# Patient Record
Sex: MALE | Race: WHITE | Employment: OTHER | URBAN - METROPOLITAN AREA
[De-identification: names, ages, dates, MRNs, and addresses within clinical notes are randomized per-mention and may not be internally consistent; named-entity substitution may affect disease eponyms.]

---

## 2017-01-09 LAB
A/G RATIO: 1.8 (ref 1.1–2.2)
ALBUMIN SERPL-MCNC: 4.4 G/DL (ref 3.4–5)
ALP BLD-CCNC: 49 U/L (ref 40–129)
ALT SERPL-CCNC: 22 U/L (ref 10–40)
ANION GAP SERPL CALCULATED.3IONS-SCNC: 15 MMOL/L (ref 3–16)
AST SERPL-CCNC: 21 U/L (ref 15–37)
BASOPHILS ABSOLUTE: 0 K/UL (ref 0–0.2)
BASOPHILS RELATIVE PERCENT: 0.7 %
BILIRUB SERPL-MCNC: 0.7 MG/DL (ref 0–1)
BILIRUBIN URINE: NEGATIVE
BLOOD, URINE: NEGATIVE
BUN BLDV-MCNC: 18 MG/DL (ref 7–20)
CALCIUM SERPL-MCNC: 9.3 MG/DL (ref 8.3–10.6)
CHLORIDE BLD-SCNC: 106 MMOL/L (ref 99–110)
CHOLESTEROL, TOTAL: 194 MG/DL (ref 0–199)
CLARITY: CLEAR
CO2: 23 MMOL/L (ref 21–32)
COLOR: YELLOW
CREAT SERPL-MCNC: 0.9 MG/DL (ref 0.9–1.3)
EOSINOPHILS ABSOLUTE: 0.2 K/UL (ref 0–0.6)
EOSINOPHILS RELATIVE PERCENT: 2.4 %
GFR AFRICAN AMERICAN: >60
GFR NON-AFRICAN AMERICAN: >60
GLOBULIN: 2.4 G/DL
GLUCOSE BLD-MCNC: 118 MG/DL (ref 70–99)
GLUCOSE URINE: NEGATIVE MG/DL
HCT VFR BLD CALC: 50.5 % (ref 40.5–52.5)
HDLC SERPL-MCNC: 44 MG/DL (ref 40–60)
HEMOGLOBIN: 17 G/DL (ref 13.5–17.5)
KETONES, URINE: NEGATIVE MG/DL
LDL CHOLESTEROL CALCULATED: 125 MG/DL
LEUKOCYTE ESTERASE, URINE: NEGATIVE
LYMPHOCYTES ABSOLUTE: 2.2 K/UL (ref 1–5.1)
LYMPHOCYTES RELATIVE PERCENT: 30.9 %
MCH RBC QN AUTO: 31.1 PG (ref 26–34)
MCHC RBC AUTO-ENTMCNC: 33.6 G/DL (ref 31–36)
MCV RBC AUTO: 92.7 FL (ref 80–100)
MICROSCOPIC EXAMINATION: NORMAL
MONOCYTES ABSOLUTE: 0.7 K/UL (ref 0–1.3)
MONOCYTES RELATIVE PERCENT: 9.3 %
NEUTROPHILS ABSOLUTE: 4.1 K/UL (ref 1.7–7.7)
NEUTROPHILS RELATIVE PERCENT: 56.7 %
NITRITE, URINE: NEGATIVE
PDW BLD-RTO: 13.8 % (ref 12.4–15.4)
PH UA: 5.5
PLATELET # BLD: 184 K/UL (ref 135–450)
PMV BLD AUTO: 9.8 FL (ref 5–10.5)
POTASSIUM SERPL-SCNC: 4.4 MMOL/L (ref 3.5–5.1)
PROSTATE SPECIFIC ANTIGEN: 2.63 NG/ML (ref 0–4)
PROTEIN UA: NEGATIVE MG/DL
RBC # BLD: 5.45 M/UL (ref 4.2–5.9)
SODIUM BLD-SCNC: 144 MMOL/L (ref 136–145)
SPECIFIC GRAVITY UA: 1.03
TOTAL PROTEIN: 6.8 G/DL (ref 6.4–8.2)
TRIGL SERPL-MCNC: 123 MG/DL (ref 0–150)
TSH SERPL DL<=0.05 MIU/L-ACNC: 1.3 UIU/ML (ref 0.27–4.2)
URINE TYPE: NORMAL
UROBILINOGEN, URINE: 1 E.U./DL
VLDLC SERPL CALC-MCNC: 25 MG/DL
WBC # BLD: 7.2 K/UL (ref 4–11)

## 2017-01-17 ENCOUNTER — OFFICE VISIT (OUTPATIENT)
Dept: INTERNAL MEDICINE | Age: 62
End: 2017-01-17

## 2017-01-17 VITALS
RESPIRATION RATE: 12 BRPM | BODY MASS INDEX: 42.66 KG/M2 | WEIGHT: 315 LBS | DIASTOLIC BLOOD PRESSURE: 60 MMHG | HEIGHT: 72 IN | SYSTOLIC BLOOD PRESSURE: 160 MMHG | HEART RATE: 68 BPM

## 2017-01-17 DIAGNOSIS — K21.00 REFLUX ESOPHAGITIS: ICD-10-CM

## 2017-01-17 DIAGNOSIS — Z00.00 WELL ADULT EXAM: Primary | ICD-10-CM

## 2017-01-17 DIAGNOSIS — G25.81 RLS (RESTLESS LEGS SYNDROME): ICD-10-CM

## 2017-01-17 DIAGNOSIS — M25.562 ARTHRALGIA OF LEFT LOWER LEG: ICD-10-CM

## 2017-01-17 DIAGNOSIS — G47.30 SLEEP APNEA, UNSPECIFIED TYPE: ICD-10-CM

## 2017-01-17 DIAGNOSIS — N40.1 BENIGN NODULAR PROSTATIC HYPERPLASIA WITH LOWER URINARY TRACT SYMPTOMS: ICD-10-CM

## 2017-01-17 DIAGNOSIS — E78.2 MIXED HYPERLIPIDEMIA: ICD-10-CM

## 2017-01-17 DIAGNOSIS — I10 ESSENTIAL HYPERTENSION: ICD-10-CM

## 2017-01-17 DIAGNOSIS — M17.11 ARTHRITIS OF RIGHT KNEE: ICD-10-CM

## 2017-01-17 DIAGNOSIS — E66.01 MORBID OBESITY WITH BMI OF 50.0-59.9, ADULT (HCC): ICD-10-CM

## 2017-01-17 DIAGNOSIS — R79.89 OTHER ABNORMAL BLOOD CHEMISTRY: ICD-10-CM

## 2017-01-17 DIAGNOSIS — R00.2 PALPITATIONS: ICD-10-CM

## 2017-01-17 PROCEDURE — 99396 PREV VISIT EST AGE 40-64: CPT | Performed by: INTERNAL MEDICINE

## 2017-01-17 RX ORDER — FENOFIBRATE 145 MG/1
TABLET, COATED ORAL
Qty: 120 TABLET | Refills: 1 | Status: SHIPPED | OUTPATIENT
Start: 2017-01-17 | End: 2017-08-09 | Stop reason: SDUPTHER

## 2017-01-17 RX ORDER — LISINOPRIL 5 MG/1
TABLET ORAL
Qty: 120 TABLET | Refills: 1 | Status: SHIPPED | OUTPATIENT
Start: 2017-01-17 | End: 2017-08-09 | Stop reason: SDUPTHER

## 2017-01-17 RX ORDER — ATORVASTATIN CALCIUM 40 MG/1
TABLET, FILM COATED ORAL
Qty: 120 TABLET | Refills: 1 | Status: SHIPPED | OUTPATIENT
Start: 2017-01-17 | End: 2017-08-09 | Stop reason: SDUPTHER

## 2017-01-17 RX ORDER — ESOMEPRAZOLE MAGNESIUM 40 MG/1
40 CAPSULE, DELAYED RELEASE ORAL 2 TIMES DAILY
Qty: 240 CAPSULE | Refills: 1 | Status: SHIPPED | OUTPATIENT
Start: 2017-01-17 | End: 2017-02-21

## 2017-01-17 ASSESSMENT — ENCOUNTER SYMPTOMS
ROS SKIN COMMENTS: NEEDS TO SEE DERM
ALLERGIC/IMMUNOLOGIC NEGATIVE: 1
EYES NEGATIVE: 1
APNEA: 1

## 2017-02-21 ENCOUNTER — HOSPITAL ENCOUNTER (OUTPATIENT)
Dept: ENDOSCOPY | Age: 62
Discharge: HOME | End: 2017-02-21
Attending: INTERNAL MEDICINE | Admitting: INTERNAL MEDICINE

## 2017-02-21 VITALS
RESPIRATION RATE: 13 BRPM | BODY MASS INDEX: 40.43 KG/M2 | SYSTOLIC BLOOD PRESSURE: 154 MMHG | WEIGHT: 315 LBS | HEART RATE: 59 BPM | HEIGHT: 74 IN | OXYGEN SATURATION: 98 % | DIASTOLIC BLOOD PRESSURE: 79 MMHG | TEMPERATURE: 97.3 F

## 2017-02-21 RX ORDER — ONDANSETRON 2 MG/ML
4 INJECTION INTRAMUSCULAR; INTRAVENOUS
Status: ACTIVE | OUTPATIENT
Start: 2017-02-21 | End: 2017-02-21

## 2017-02-21 RX ORDER — ESOMEPRAZOLE MAGNESIUM 40 MG/1
40 CAPSULE, DELAYED RELEASE ORAL
Qty: 240 CAPSULE | Refills: 0 | Status: SHIPPED | OUTPATIENT
Start: 2017-02-21 | End: 2017-10-25 | Stop reason: SDUPTHER

## 2017-02-21 RX ORDER — HYDRALAZINE HYDROCHLORIDE 20 MG/ML
5 INJECTION INTRAMUSCULAR; INTRAVENOUS EVERY 5 MIN PRN
Status: DISCONTINUED | OUTPATIENT
Start: 2017-02-21 | End: 2017-02-22 | Stop reason: HOSPADM

## 2017-02-21 RX ORDER — FENTANYL CITRATE 50 UG/ML
25 INJECTION, SOLUTION INTRAMUSCULAR; INTRAVENOUS EVERY 5 MIN PRN
Status: DISCONTINUED | OUTPATIENT
Start: 2017-02-21 | End: 2017-02-22 | Stop reason: HOSPADM

## 2017-02-21 RX ORDER — LABETALOL HYDROCHLORIDE 5 MG/ML
5 INJECTION, SOLUTION INTRAVENOUS EVERY 5 MIN PRN
Status: DISCONTINUED | OUTPATIENT
Start: 2017-02-21 | End: 2017-02-22 | Stop reason: HOSPADM

## 2017-02-21 ASSESSMENT — PAIN - FUNCTIONAL ASSESSMENT: PAIN_FUNCTIONAL_ASSESSMENT: 0-10

## 2017-02-21 ASSESSMENT — PAIN SCALES - GENERAL: PAINLEVEL_OUTOF10: 0

## 2017-04-18 ENCOUNTER — OFFICE VISIT (OUTPATIENT)
Dept: INTERNAL MEDICINE | Age: 62
End: 2017-04-18

## 2017-04-18 VITALS
RESPIRATION RATE: 14 BRPM | WEIGHT: 315 LBS | HEART RATE: 70 BPM | BODY MASS INDEX: 42.66 KG/M2 | DIASTOLIC BLOOD PRESSURE: 78 MMHG | SYSTOLIC BLOOD PRESSURE: 128 MMHG | HEIGHT: 72 IN

## 2017-04-18 DIAGNOSIS — R00.2 PALPITATIONS: ICD-10-CM

## 2017-04-18 DIAGNOSIS — G47.30 SLEEP APNEA, UNSPECIFIED TYPE: ICD-10-CM

## 2017-04-18 DIAGNOSIS — E66.01 MORBID OBESITY WITH BMI OF 50.0-59.9, ADULT (HCC): ICD-10-CM

## 2017-04-18 DIAGNOSIS — G25.81 RLS (RESTLESS LEGS SYNDROME): ICD-10-CM

## 2017-04-18 DIAGNOSIS — E78.2 MIXED HYPERLIPIDEMIA: ICD-10-CM

## 2017-04-18 DIAGNOSIS — I10 ESSENTIAL HYPERTENSION: Primary | ICD-10-CM

## 2017-04-18 DIAGNOSIS — K21.00 REFLUX ESOPHAGITIS: ICD-10-CM

## 2017-04-18 DIAGNOSIS — R79.89 OTHER ABNORMAL BLOOD CHEMISTRY: ICD-10-CM

## 2017-04-18 DIAGNOSIS — M17.11 ARTHRITIS OF RIGHT KNEE: ICD-10-CM

## 2017-04-18 DIAGNOSIS — N40.1 BENIGN NODULAR PROSTATIC HYPERPLASIA WITH LOWER URINARY TRACT SYMPTOMS: ICD-10-CM

## 2017-04-18 PROCEDURE — 99214 OFFICE O/P EST MOD 30 MIN: CPT | Performed by: INTERNAL MEDICINE

## 2017-04-18 ASSESSMENT — ENCOUNTER SYMPTOMS
APNEA: 1
ALLERGIC/IMMUNOLOGIC NEGATIVE: 1
ROS SKIN COMMENTS: NEEDS TO SEE DERM
EYES NEGATIVE: 1

## 2017-06-01 ENCOUNTER — OFFICE VISIT (OUTPATIENT)
Dept: INTERNAL MEDICINE | Age: 62
End: 2017-06-01

## 2017-06-01 VITALS
BODY MASS INDEX: 42.66 KG/M2 | WEIGHT: 315 LBS | HEIGHT: 72 IN | DIASTOLIC BLOOD PRESSURE: 68 MMHG | SYSTOLIC BLOOD PRESSURE: 156 MMHG

## 2017-06-01 DIAGNOSIS — J40 BRONCHITIS: ICD-10-CM

## 2017-06-01 DIAGNOSIS — E66.01 MORBID OBESITY WITH BMI OF 50.0-59.9, ADULT (HCC): ICD-10-CM

## 2017-06-01 PROCEDURE — 99213 OFFICE O/P EST LOW 20 MIN: CPT | Performed by: INTERNAL MEDICINE

## 2017-06-01 RX ORDER — DOXYCYCLINE HYCLATE 100 MG
100 TABLET ORAL 2 TIMES DAILY
Qty: 20 TABLET | Refills: 0 | Status: SHIPPED | OUTPATIENT
Start: 2017-06-01 | End: 2017-06-11

## 2017-06-01 ASSESSMENT — ENCOUNTER SYMPTOMS
NAUSEA: 0
VOMITING: 0
BACK PAIN: 0
SORE THROAT: 1
ABDOMINAL PAIN: 0
COUGH: 1
SHORTNESS OF BREATH: 0

## 2017-06-23 ENCOUNTER — OFFICE VISIT (OUTPATIENT)
Dept: INTERNAL MEDICINE | Age: 62
End: 2017-06-23

## 2017-06-23 ENCOUNTER — HOSPITAL ENCOUNTER (OUTPATIENT)
Dept: OTHER | Age: 62
Discharge: HOME | End: 2017-06-23
Attending: INTERNAL MEDICINE | Admitting: INTERNAL MEDICINE

## 2017-06-23 VITALS
TEMPERATURE: 98.5 F | RESPIRATION RATE: 14 BRPM | BODY MASS INDEX: 42.66 KG/M2 | SYSTOLIC BLOOD PRESSURE: 132 MMHG | HEART RATE: 88 BPM | WEIGHT: 315 LBS | HEIGHT: 72 IN | DIASTOLIC BLOOD PRESSURE: 80 MMHG

## 2017-06-23 DIAGNOSIS — J06.9 ACUTE URI: Primary | ICD-10-CM

## 2017-06-23 DIAGNOSIS — R79.89 OTHER ABNORMAL BLOOD CHEMISTRY: ICD-10-CM

## 2017-06-23 DIAGNOSIS — E66.01 MORBID OBESITY WITH BMI OF 50.0-59.9, ADULT (HCC): ICD-10-CM

## 2017-06-23 DIAGNOSIS — G47.30 SLEEP APNEA, UNSPECIFIED TYPE: ICD-10-CM

## 2017-06-23 DIAGNOSIS — J06.9 ACUTE URI: ICD-10-CM

## 2017-06-23 DIAGNOSIS — I10 ESSENTIAL HYPERTENSION: ICD-10-CM

## 2017-06-23 DIAGNOSIS — E78.2 MIXED HYPERLIPIDEMIA: ICD-10-CM

## 2017-06-23 DIAGNOSIS — R00.2 PALPITATIONS: ICD-10-CM

## 2017-06-23 PROCEDURE — 99214 OFFICE O/P EST MOD 30 MIN: CPT | Performed by: INTERNAL MEDICINE

## 2017-06-23 RX ORDER — FLUTICASONE PROPIONATE 50 MCG
1 SPRAY, SUSPENSION (ML) NASAL DAILY
COMMUNITY
End: 2018-10-08 | Stop reason: ALTCHOICE

## 2017-06-23 RX ORDER — AZITHROMYCIN 250 MG/1
250 TABLET, FILM COATED ORAL DAILY
Qty: 6 TABLET | Refills: 0 | Status: SHIPPED | OUTPATIENT
Start: 2017-06-23 | End: 2017-06-28

## 2017-06-23 ASSESSMENT — ENCOUNTER SYMPTOMS
ALLERGIC/IMMUNOLOGIC NEGATIVE: 1
COUGH: 1
APNEA: 1
EYES NEGATIVE: 1
ROS SKIN COMMENTS: NEEDS TO SEE DERM

## 2017-07-25 ENCOUNTER — OFFICE VISIT (OUTPATIENT)
Dept: INTERNAL MEDICINE | Age: 62
End: 2017-07-25

## 2017-07-25 VITALS
TEMPERATURE: 98.3 F | DIASTOLIC BLOOD PRESSURE: 74 MMHG | HEART RATE: 68 BPM | HEIGHT: 72 IN | RESPIRATION RATE: 14 BRPM | WEIGHT: 315 LBS | BODY MASS INDEX: 42.66 KG/M2 | SYSTOLIC BLOOD PRESSURE: 124 MMHG

## 2017-07-25 DIAGNOSIS — I10 ESSENTIAL HYPERTENSION: ICD-10-CM

## 2017-07-25 DIAGNOSIS — N40.1 BENIGN NODULAR PROSTATIC HYPERPLASIA WITH LOWER URINARY TRACT SYMPTOMS: ICD-10-CM

## 2017-07-25 DIAGNOSIS — K21.00 REFLUX ESOPHAGITIS: ICD-10-CM

## 2017-07-25 DIAGNOSIS — E78.2 MIXED HYPERLIPIDEMIA: ICD-10-CM

## 2017-07-25 DIAGNOSIS — N30.00 ACUTE CYSTITIS WITHOUT HEMATURIA: ICD-10-CM

## 2017-07-25 DIAGNOSIS — R79.89 OTHER ABNORMAL BLOOD CHEMISTRY: ICD-10-CM

## 2017-07-25 DIAGNOSIS — E66.01 MORBID OBESITY WITH BMI OF 50.0-59.9, ADULT (HCC): ICD-10-CM

## 2017-07-25 LAB
BILIRUBIN, POC: NORMAL
BLOOD URINE, POC: NORMAL
CLARITY, POC: CLEAR
COLOR, POC: YELLOW
GLUCOSE URINE, POC: NORMAL
KETONES, POC: NORMAL
LEUKOCYTE EST, POC: NORMAL
NITRITE, POC: NORMAL
PH, POC: 5
PROTEIN, POC: NORMAL
SPECIFIC GRAVITY, POC: 1.02
UROBILINOGEN, POC: 0.2

## 2017-07-25 PROCEDURE — 99214 OFFICE O/P EST MOD 30 MIN: CPT | Performed by: INTERNAL MEDICINE

## 2017-07-25 PROCEDURE — 81002 URINALYSIS NONAUTO W/O SCOPE: CPT | Performed by: INTERNAL MEDICINE

## 2017-07-25 RX ORDER — CIPROFLOXACIN 500 MG/1
500 TABLET, FILM COATED ORAL 2 TIMES DAILY
Qty: 20 TABLET | Refills: 0 | Status: SHIPPED | OUTPATIENT
Start: 2017-07-25 | End: 2017-08-04

## 2017-07-25 ASSESSMENT — ENCOUNTER SYMPTOMS
ALLERGIC/IMMUNOLOGIC NEGATIVE: 1
APNEA: 1
ROS SKIN COMMENTS: NEEDS TO SEE DERM
EYES NEGATIVE: 1

## 2017-07-25 ASSESSMENT — PATIENT HEALTH QUESTIONNAIRE - PHQ9
SUM OF ALL RESPONSES TO PHQ9 QUESTIONS 1 & 2: 0
2. FEELING DOWN, DEPRESSED OR HOPELESS: 0
SUM OF ALL RESPONSES TO PHQ QUESTIONS 1-9: 0
1. LITTLE INTEREST OR PLEASURE IN DOING THINGS: 0

## 2017-07-27 LAB — URINE CULTURE, ROUTINE: NORMAL

## 2017-08-09 RX ORDER — LISINOPRIL 5 MG/1
TABLET ORAL
Qty: 90 TABLET | Refills: 2 | Status: SHIPPED | OUTPATIENT
Start: 2017-08-09 | End: 2017-10-25 | Stop reason: SDUPTHER

## 2017-08-09 RX ORDER — ATORVASTATIN CALCIUM 40 MG/1
TABLET, FILM COATED ORAL
Qty: 90 TABLET | Refills: 2 | Status: SHIPPED | OUTPATIENT
Start: 2017-08-09 | End: 2018-06-07 | Stop reason: SDUPTHER

## 2017-08-09 RX ORDER — ESOMEPRAZOLE MAGNESIUM 40 MG/1
CAPSULE, DELAYED RELEASE ORAL
Qty: 360 CAPSULE | Refills: 2 | Status: SHIPPED | OUTPATIENT
Start: 2017-08-09 | End: 2017-10-25 | Stop reason: DRUGHIGH

## 2017-08-09 RX ORDER — FENOFIBRATE 145 MG/1
TABLET, COATED ORAL
Qty: 90 TABLET | Refills: 2 | Status: SHIPPED | OUTPATIENT
Start: 2017-08-09 | End: 2018-06-07 | Stop reason: SDUPTHER

## 2017-10-24 DIAGNOSIS — G47.30 SLEEP APNEA, UNSPECIFIED TYPE: ICD-10-CM

## 2017-10-24 DIAGNOSIS — G25.81 RLS (RESTLESS LEGS SYNDROME): ICD-10-CM

## 2017-10-24 DIAGNOSIS — E78.2 MIXED HYPERLIPIDEMIA: ICD-10-CM

## 2017-10-24 DIAGNOSIS — M17.11 ARTHRITIS OF RIGHT KNEE: ICD-10-CM

## 2017-10-24 DIAGNOSIS — E66.01 MORBID OBESITY WITH BMI OF 50.0-59.9, ADULT (HCC): ICD-10-CM

## 2017-10-24 DIAGNOSIS — R79.89 OTHER SPECIFIED ABNORMAL FINDINGS OF BLOOD CHEMISTRY: ICD-10-CM

## 2017-10-24 DIAGNOSIS — K21.00 REFLUX ESOPHAGITIS: ICD-10-CM

## 2017-10-24 DIAGNOSIS — R00.2 PALPITATIONS: ICD-10-CM

## 2017-10-24 DIAGNOSIS — N40.1 BENIGN NODULAR PROSTATIC HYPERPLASIA WITH LOWER URINARY TRACT SYMPTOMS: ICD-10-CM

## 2017-10-24 DIAGNOSIS — I10 ESSENTIAL HYPERTENSION: ICD-10-CM

## 2017-10-24 LAB
A/G RATIO: 1.7 (ref 1.1–2.2)
ALBUMIN SERPL-MCNC: 4.4 G/DL (ref 3.4–5)
ALP BLD-CCNC: 42 U/L (ref 40–129)
ALT SERPL-CCNC: 26 U/L (ref 10–40)
ANION GAP SERPL CALCULATED.3IONS-SCNC: 12 MMOL/L (ref 3–16)
AST SERPL-CCNC: 23 U/L (ref 15–37)
BASOPHILS ABSOLUTE: 0 K/UL (ref 0–0.2)
BASOPHILS RELATIVE PERCENT: 0.7 %
BILIRUB SERPL-MCNC: 0.7 MG/DL (ref 0–1)
BUN BLDV-MCNC: 19 MG/DL (ref 7–20)
CALCIUM SERPL-MCNC: 9.5 MG/DL (ref 8.3–10.6)
CHLORIDE BLD-SCNC: 101 MMOL/L (ref 99–110)
CHOLESTEROL, TOTAL: 199 MG/DL (ref 0–199)
CO2: 26 MMOL/L (ref 21–32)
CREAT SERPL-MCNC: 0.8 MG/DL (ref 0.8–1.3)
EOSINOPHILS ABSOLUTE: 0.1 K/UL (ref 0–0.6)
EOSINOPHILS RELATIVE PERCENT: 2.3 %
ESTIMATED AVERAGE GLUCOSE: 91.1 MG/DL
GFR AFRICAN AMERICAN: >60
GFR NON-AFRICAN AMERICAN: >60
GLOBULIN: 2.6 G/DL
GLUCOSE BLD-MCNC: 104 MG/DL (ref 70–99)
HBA1C MFR BLD: 4.8 %
HCT VFR BLD CALC: 49.5 % (ref 40.5–52.5)
HDLC SERPL-MCNC: 45 MG/DL (ref 40–60)
HEMOGLOBIN: 16.7 G/DL (ref 13.5–17.5)
LDL CHOLESTEROL CALCULATED: 117 MG/DL
LYMPHOCYTES ABSOLUTE: 2.1 K/UL (ref 1–5.1)
LYMPHOCYTES RELATIVE PERCENT: 34 %
MCH RBC QN AUTO: 31.7 PG (ref 26–34)
MCHC RBC AUTO-ENTMCNC: 33.7 G/DL (ref 31–36)
MCV RBC AUTO: 94.1 FL (ref 80–100)
MONOCYTES ABSOLUTE: 0.6 K/UL (ref 0–1.3)
MONOCYTES RELATIVE PERCENT: 9.7 %
NEUTROPHILS ABSOLUTE: 3.3 K/UL (ref 1.7–7.7)
NEUTROPHILS RELATIVE PERCENT: 53.3 %
PDW BLD-RTO: 14.2 % (ref 12.4–15.4)
PLATELET # BLD: 178 K/UL (ref 135–450)
PMV BLD AUTO: 10.1 FL (ref 5–10.5)
POTASSIUM SERPL-SCNC: 4.5 MMOL/L (ref 3.5–5.1)
RBC # BLD: 5.26 M/UL (ref 4.2–5.9)
SODIUM BLD-SCNC: 139 MMOL/L (ref 136–145)
TOTAL PROTEIN: 7 G/DL (ref 6.4–8.2)
TRIGL SERPL-MCNC: 184 MG/DL (ref 0–150)
TSH SERPL DL<=0.05 MIU/L-ACNC: 2 UIU/ML (ref 0.27–4.2)
VITAMIN D 25-HYDROXY: 35.9 NG/ML
VLDLC SERPL CALC-MCNC: 37 MG/DL
WBC # BLD: 6.2 K/UL (ref 4–11)

## 2017-10-25 ENCOUNTER — OFFICE VISIT (OUTPATIENT)
Dept: INTERNAL MEDICINE | Age: 62
End: 2017-10-25

## 2017-10-25 VITALS
DIASTOLIC BLOOD PRESSURE: 62 MMHG | RESPIRATION RATE: 16 BRPM | SYSTOLIC BLOOD PRESSURE: 160 MMHG | WEIGHT: 315 LBS | HEART RATE: 70 BPM | HEIGHT: 72 IN | BODY MASS INDEX: 42.66 KG/M2

## 2017-10-25 DIAGNOSIS — M17.11 ARTHRITIS OF RIGHT KNEE: ICD-10-CM

## 2017-10-25 DIAGNOSIS — G47.30 SLEEP APNEA, UNSPECIFIED TYPE: ICD-10-CM

## 2017-10-25 DIAGNOSIS — I10 ESSENTIAL HYPERTENSION: ICD-10-CM

## 2017-10-25 DIAGNOSIS — M25.562 ARTHRALGIA OF LEFT LOWER LEG: ICD-10-CM

## 2017-10-25 DIAGNOSIS — R00.2 PALPITATIONS: ICD-10-CM

## 2017-10-25 DIAGNOSIS — G25.81 RLS (RESTLESS LEGS SYNDROME): ICD-10-CM

## 2017-10-25 DIAGNOSIS — R35.1 BENIGN PROSTATIC HYPERPLASIA WITH NOCTURIA: ICD-10-CM

## 2017-10-25 DIAGNOSIS — Z23 NEED FOR INFLUENZA VACCINATION: Primary | ICD-10-CM

## 2017-10-25 DIAGNOSIS — N40.1 BENIGN PROSTATIC HYPERPLASIA WITH NOCTURIA: ICD-10-CM

## 2017-10-25 DIAGNOSIS — K21.00 REFLUX ESOPHAGITIS: ICD-10-CM

## 2017-10-25 DIAGNOSIS — E78.2 MIXED HYPERLIPIDEMIA: ICD-10-CM

## 2017-10-25 DIAGNOSIS — E66.01 MORBID OBESITY WITH BMI OF 50.0-59.9, ADULT (HCC): ICD-10-CM

## 2017-10-25 PROBLEM — N30.00 ACUTE CYSTITIS WITHOUT HEMATURIA: Status: RESOLVED | Noted: 2017-07-25 | Resolved: 2017-10-25

## 2017-10-25 PROBLEM — J40 BRONCHITIS: Status: RESOLVED | Noted: 2017-06-01 | Resolved: 2017-10-25

## 2017-10-25 PROBLEM — J06.9 ACUTE URI: Status: RESOLVED | Noted: 2017-06-23 | Resolved: 2017-10-25

## 2017-10-25 PROCEDURE — G8427 DOCREV CUR MEDS BY ELIG CLIN: HCPCS | Performed by: INTERNAL MEDICINE

## 2017-10-25 PROCEDURE — 90630 INFLUENZA, QUADV, 18-64 YRS, ID, PF, MICRO INJ, 0.1ML (FLUZONE QUADV, PF): CPT | Performed by: INTERNAL MEDICINE

## 2017-10-25 PROCEDURE — 99214 OFFICE O/P EST MOD 30 MIN: CPT | Performed by: INTERNAL MEDICINE

## 2017-10-25 PROCEDURE — 90471 IMMUNIZATION ADMIN: CPT | Performed by: INTERNAL MEDICINE

## 2017-10-25 PROCEDURE — G8417 CALC BMI ABV UP PARAM F/U: HCPCS | Performed by: INTERNAL MEDICINE

## 2017-10-25 PROCEDURE — 1036F TOBACCO NON-USER: CPT | Performed by: INTERNAL MEDICINE

## 2017-10-25 PROCEDURE — G8484 FLU IMMUNIZE NO ADMIN: HCPCS | Performed by: INTERNAL MEDICINE

## 2017-10-25 PROCEDURE — 3017F COLORECTAL CA SCREEN DOC REV: CPT | Performed by: INTERNAL MEDICINE

## 2017-10-25 RX ORDER — CLINDAMYCIN HYDROCHLORIDE 300 MG/1
300 CAPSULE ORAL 3 TIMES DAILY
COMMUNITY
End: 2018-01-24 | Stop reason: ALTCHOICE

## 2017-10-25 RX ORDER — ESOMEPRAZOLE MAGNESIUM 40 MG/1
40 CAPSULE, DELAYED RELEASE ORAL
COMMUNITY
End: 2018-01-24 | Stop reason: SDUPTHER

## 2017-10-25 RX ORDER — LISINOPRIL 10 MG/1
TABLET ORAL
Qty: 90 TABLET | Refills: 1 | Status: SHIPPED | OUTPATIENT
Start: 2017-10-25 | End: 2018-01-09 | Stop reason: SDUPTHER

## 2017-10-25 RX ORDER — CETIRIZINE HYDROCHLORIDE 10 MG/1
10 TABLET ORAL PRN
COMMUNITY
End: 2020-08-21 | Stop reason: ALTCHOICE

## 2017-10-25 ASSESSMENT — ENCOUNTER SYMPTOMS
ROS SKIN COMMENTS: NEEDS TO SEE DERM
EYES NEGATIVE: 1
ALLERGIC/IMMUNOLOGIC NEGATIVE: 1
APNEA: 1

## 2017-10-25 NOTE — PROGRESS NOTES
Subjective:      Patient ID: Khalida Mcclain is a 63 y.o. male. Here today for follow up of chronic problems see problem list, no new c/o feels good       Hypertension   This is a chronic problem. The current episode started more than 1 year ago. The problem has been waxing and waning since onset. The problem is controlled. Pertinent negatives include no chest pain or palpitations. There are no associated agents to hypertension. Risk factors for coronary artery disease include dyslipidemia, male gender, obesity and sedentary lifestyle. Past treatments include lifestyle changes and ACE inhibitors. The current treatment provides moderate improvement. Compliance problems include diet and exercise. Hyperlipidemia   This is a chronic problem. The current episode started more than 1 year ago. The problem is controlled. Recent lipid tests were reviewed and are normal. Factors aggravating his hyperlipidemia include fatty foods. Pertinent negatives include no chest pain. Current antihyperlipidemic treatment includes diet change and statins. The current treatment provides significant improvement of lipids. There are no compliance problems. Review of Systems   Constitutional: Positive for fever. Negative for chills, diaphoresis and unexpected weight change (up a few #). HENT: Positive for congestion and sneezing. Eyes: Negative. Respiratory: Positive for apnea (improved using C-pap ). Cardiovascular: Negative for chest pain, palpitations and leg swelling. Gastrointestinal:        Miguel Owens sx  EGD 12/13/16 ? V[facility] to have repeat EGD 2/17 - was improved  Nexium now 40 QD - now down to 20 mg    Endocrine: Negative. Genitourinary: Positive for frequency and urgency. Nocturia   Musculoskeletal: Positive for gait problem (s/p bilat TKR ambulating improved ). Skin:        Needs to see derm     Allergic/Immunologic: Negative. Hematological: Negative. Psychiatric/Behavioral: Negative.

## 2018-01-05 ENCOUNTER — TELEPHONE (OUTPATIENT)
Dept: INTERNAL MEDICINE | Age: 63
End: 2018-01-05

## 2018-01-05 DIAGNOSIS — J06.9 UPPER RESPIRATORY TRACT INFECTION, UNSPECIFIED TYPE: Primary | ICD-10-CM

## 2018-01-05 RX ORDER — OSELTAMIVIR PHOSPHATE 75 MG/1
75 CAPSULE ORAL 2 TIMES DAILY
Qty: 10 CAPSULE | Refills: 0 | OUTPATIENT
Start: 2018-01-05 | End: 2018-01-10

## 2018-01-05 NOTE — TELEPHONE ENCOUNTER
Pt states he started last night with a cough and fever of 100. This morning fever was 101.5, sinus congestion, patient was around someone that was Dx with FLU.

## 2018-01-10 RX ORDER — LISINOPRIL 10 MG/1
TABLET ORAL
Qty: 90 TABLET | Refills: 1 | Status: SHIPPED | OUTPATIENT
Start: 2018-01-10 | End: 2018-06-07 | Stop reason: SDUPTHER

## 2018-01-24 ENCOUNTER — OFFICE VISIT (OUTPATIENT)
Dept: INTERNAL MEDICINE | Age: 63
End: 2018-01-24

## 2018-01-24 VITALS
SYSTOLIC BLOOD PRESSURE: 132 MMHG | HEIGHT: 72 IN | DIASTOLIC BLOOD PRESSURE: 82 MMHG | RESPIRATION RATE: 16 BRPM | WEIGHT: 315 LBS | HEART RATE: 66 BPM | BODY MASS INDEX: 42.66 KG/M2

## 2018-01-24 DIAGNOSIS — G25.81 RLS (RESTLESS LEGS SYNDROME): ICD-10-CM

## 2018-01-24 DIAGNOSIS — I10 ESSENTIAL HYPERTENSION: ICD-10-CM

## 2018-01-24 DIAGNOSIS — E66.01 MORBID OBESITY WITH BMI OF 50.0-59.9, ADULT (HCC): ICD-10-CM

## 2018-01-24 DIAGNOSIS — M25.562 ARTHRALGIA OF LEFT LOWER LEG: ICD-10-CM

## 2018-01-24 DIAGNOSIS — Z00.00 WELL ADULT EXAM: Primary | ICD-10-CM

## 2018-01-24 DIAGNOSIS — E78.2 MIXED HYPERLIPIDEMIA: ICD-10-CM

## 2018-01-24 DIAGNOSIS — R35.1 BENIGN PROSTATIC HYPERPLASIA WITH NOCTURIA: ICD-10-CM

## 2018-01-24 DIAGNOSIS — N40.1 BENIGN PROSTATIC HYPERPLASIA WITH NOCTURIA: ICD-10-CM

## 2018-01-24 DIAGNOSIS — G47.30 SLEEP APNEA, UNSPECIFIED TYPE: ICD-10-CM

## 2018-01-24 DIAGNOSIS — K21.00 REFLUX ESOPHAGITIS: ICD-10-CM

## 2018-01-24 DIAGNOSIS — R00.2 PALPITATIONS: ICD-10-CM

## 2018-01-24 DIAGNOSIS — M17.11 ARTHRITIS OF RIGHT KNEE: ICD-10-CM

## 2018-01-24 PROCEDURE — 99396 PREV VISIT EST AGE 40-64: CPT | Performed by: INTERNAL MEDICINE

## 2018-01-24 RX ORDER — ESOMEPRAZOLE MAGNESIUM 40 MG/1
40 CAPSULE, DELAYED RELEASE ORAL
Qty: 90 CAPSULE | Refills: 2 | Status: SHIPPED | OUTPATIENT
Start: 2018-01-24 | End: 2018-07-03 | Stop reason: SDUPTHER

## 2018-01-24 ASSESSMENT — ENCOUNTER SYMPTOMS
APNEA: 1
ROS SKIN COMMENTS: NEEDS TO SEE DERM
ALLERGIC/IMMUNOLOGIC NEGATIVE: 1
EYES NEGATIVE: 1

## 2018-01-24 NOTE — PROGRESS NOTES
Subjective:      Patient ID: Sudeep Foster is a 63 y.o. male. Here today for complete physical and review of listed chronic problem         Review of Systems   Constitutional: Negative. Negative for chills, diaphoresis and unexpected weight change (up a little ). HENT: Negative. Eyes: Negative. Respiratory: Positive for apnea (improved using C-pap ). Cardiovascular: Positive for palpitations (improved). Negative for leg swelling. Gastrointestinal:        Justin Gongora sx  EGD 12/13/16 ? [place] to have repeat EGD 2/17  Nexium now 40 BID    Endocrine: Negative. Genitourinary: Positive for frequency and urgency. Nocturia   Musculoskeletal: Positive for gait problem (s/p bilat TKR ambulating improved ). Skin:        Needs to see derm     Allergic/Immunologic: Negative. Hematological: Negative. Psychiatric/Behavioral: Negative. Objective:   Physical Exam   Constitutional: He is oriented to person, place, and time. He appears well-developed and well-nourished. HENT:   Head: Normocephalic and atraumatic. Right Ear: External ear normal.   Left Ear: External ear normal.   Nose: Nose normal.   Mouth/Throat: Oropharynx is clear and moist.   Eyes: Conjunctivae and EOM are normal. Pupils are equal, round, and reactive to light. Neck: Normal range of motion. Neck supple. No tracheal deviation present. No thyromegaly present. Cardiovascular: Normal rate, regular rhythm, normal heart sounds and intact distal pulses. No murmur heard. Pulmonary/Chest: Effort normal and breath sounds normal.   Abdominal: Soft. Bowel sounds are normal.   obese   Genitourinary: Rectum normal and penis normal. Rectal exam shows guaiac negative stool. Genitourinary Comments: Slight increase in prostate size no tenderness   Musculoskeletal: Normal range of motion. He exhibits edema (1+). Tenderness: knees  improved. Neurological: He is alert and oriented to person, place, and time.  He has normal reflexes. Skin: Skin is warm and dry. Psychiatric: He has a normal mood and affect. His behavior is normal.       Assessment:        Arthritis of right knee  Improved needs to exercise     BPH (benign prostatic hyperplasia)  On and off symptoms     Essential hypertension  Stable continue current meds and return in 3 mo. Knee pain, left  Cont to improve slowly     Mixed hyperlipidemia  Stable continue current meds and return in 3 mo. Morbid obesity with BMI of 50.0-59.9, adult ([facility] [address]  Needs to diet and exercise     Palpitations  occ symptoms     Reflux esophagitis  Controled with diet and meds ?  Needs repeat EGD now     RLS (restless legs syndrome)  Improved no recent symptoms     Sleep apnea  Improved with C-pap     Well adult exam  Within normal limits for age- cont to work no ADL issues,immunizations up to date, no depression ,no cognitive impairment  Colonoscopy up to date  Eye exam up to date  Exercises as tolerated    No living will but does not want resuscitation   Findings and recommendations discussed with Pt          Plan:

## 2018-03-27 ENCOUNTER — OFFICE VISIT (OUTPATIENT)
Dept: INTERNAL MEDICINE | Age: 63
End: 2018-03-27

## 2018-03-27 VITALS — WEIGHT: 315 LBS | BODY MASS INDEX: 42.66 KG/M2 | HEIGHT: 72 IN

## 2018-03-27 DIAGNOSIS — K21.00 REFLUX ESOPHAGITIS: ICD-10-CM

## 2018-03-27 DIAGNOSIS — E66.01 MORBID OBESITY WITH BMI OF 50.0-59.9, ADULT (HCC): ICD-10-CM

## 2018-03-27 DIAGNOSIS — R01.1 HEART MURMUR: Primary | ICD-10-CM

## 2018-03-27 DIAGNOSIS — R00.2 PALPITATIONS: ICD-10-CM

## 2018-03-27 DIAGNOSIS — E78.2 MIXED HYPERLIPIDEMIA: ICD-10-CM

## 2018-03-27 DIAGNOSIS — I10 ESSENTIAL HYPERTENSION: ICD-10-CM

## 2018-03-27 PROCEDURE — 3017F COLORECTAL CA SCREEN DOC REV: CPT | Performed by: INTERNAL MEDICINE

## 2018-03-27 PROCEDURE — G8482 FLU IMMUNIZE ORDER/ADMIN: HCPCS | Performed by: INTERNAL MEDICINE

## 2018-03-27 PROCEDURE — G8417 CALC BMI ABV UP PARAM F/U: HCPCS | Performed by: INTERNAL MEDICINE

## 2018-03-27 PROCEDURE — 99214 OFFICE O/P EST MOD 30 MIN: CPT | Performed by: INTERNAL MEDICINE

## 2018-03-27 PROCEDURE — 93000 ELECTROCARDIOGRAM COMPLETE: CPT | Performed by: INTERNAL MEDICINE

## 2018-03-27 PROCEDURE — 1036F TOBACCO NON-USER: CPT | Performed by: INTERNAL MEDICINE

## 2018-03-27 PROCEDURE — G8427 DOCREV CUR MEDS BY ELIG CLIN: HCPCS | Performed by: INTERNAL MEDICINE

## 2018-03-27 ASSESSMENT — ENCOUNTER SYMPTOMS
SHORTNESS OF BREATH: 0
WHEEZING: 0
ORTHOPNEA: 0
EYES NEGATIVE: 1
ROS SKIN COMMENTS: NEEDS TO SEE DERM
ALLERGIC/IMMUNOLOGIC NEGATIVE: 1
APNEA: 1
CHEST TIGHTNESS: 0

## 2018-03-27 NOTE — PROGRESS NOTES
(LIPITOR) 40 MG tablet Take 1 tablet by mouth  daily 90 tablet 2    fenofibrate (TRICOR) 145 MG tablet Take 1 tablet by mouth  daily 90 tablet 2    fluticasone (FLONASE) 50 MCG/ACT nasal spray 1 spray by Nasal route daily      CIALIS 20 MG tablet TAKE AS DIRECTED 6 tablet 0    Cholecalciferol (VITAMIN D) 1000 UNIT CAPS Take 2,000 Units by mouth. No current facility-administered medications on file prior to visit. Past Medical History:   Diagnosis Date    Apnea     Hyperlipidemia     Kidney stone on right side 1/2016    passed ok     Syncope 10/2004    Unspecified sleep apnea     using C-PAP       Family History   Problem Relation Age of Onset    Cancer Mother      ovarian    Other Father      Pulmonary Fibrosis       Past Surgical History:   Procedure Laterality Date    COLONOSCOPY  01/26/2004    POLYP    COLONOSCOPY  12/13/2016    polyps Dr. Elma Palacios repeat in 2020    ENDOSCOPY, COLON, [address]    [place]    JOINT REPLACEMENT      left knee    JOINT REPLACEMENT Left 11/19/13    L TKR Dr Julián Moreno Right 11/2014    R TKR Dr Laly Leach Marital status:      Spouse name: N/A    Number of children: N/A    Years of education: N/A     Occupational History    Not on file. Social History Main Topics    Smoking status: Never Smoker    Smokeless tobacco: Never Used    Alcohol use Yes      Comment: occ    Drug use: No    Sexual activity: Not on file     Other Topics Concern    Not on file     Social History Narrative    ** Merged History Encounter **            Review of Systems  Review of Systems   Constitutional: Negative. Negative for chills, diaphoresis and unexpected weight change (up a little ). HENT: Negative. Eyes: Negative. Respiratory: Positive for apnea (improved using C-pap ). Negative for chest tightness, shortness of breath and wheezing. murmur  ?  Etiology send for ECHO  EKG no acute changes has RBB

## 2018-04-02 DIAGNOSIS — R00.2 PALPITATIONS: ICD-10-CM

## 2018-04-02 DIAGNOSIS — N40.1 BENIGN PROSTATIC HYPERPLASIA WITH NOCTURIA: ICD-10-CM

## 2018-04-02 DIAGNOSIS — E78.2 MIXED HYPERLIPIDEMIA: ICD-10-CM

## 2018-04-02 DIAGNOSIS — E66.01 MORBID OBESITY WITH BMI OF 50.0-59.9, ADULT (HCC): ICD-10-CM

## 2018-04-02 DIAGNOSIS — K21.00 REFLUX ESOPHAGITIS: ICD-10-CM

## 2018-04-02 DIAGNOSIS — G47.30 SLEEP APNEA, UNSPECIFIED TYPE: ICD-10-CM

## 2018-04-02 DIAGNOSIS — M25.562 ARTHRALGIA OF LEFT LOWER LEG: ICD-10-CM

## 2018-04-02 DIAGNOSIS — M17.11 ARTHRITIS OF RIGHT KNEE: ICD-10-CM

## 2018-04-02 DIAGNOSIS — I10 ESSENTIAL HYPERTENSION: ICD-10-CM

## 2018-04-02 DIAGNOSIS — R35.1 BENIGN PROSTATIC HYPERPLASIA WITH NOCTURIA: ICD-10-CM

## 2018-04-02 DIAGNOSIS — G25.81 RLS (RESTLESS LEGS SYNDROME): ICD-10-CM

## 2018-04-02 LAB
A/G RATIO: 2 (ref 1.1–2.2)
ALBUMIN SERPL-MCNC: 4.4 G/DL (ref 3.4–5)
ALP BLD-CCNC: 41 U/L (ref 40–129)
ALT SERPL-CCNC: 23 U/L (ref 10–40)
ANION GAP SERPL CALCULATED.3IONS-SCNC: 17 MMOL/L (ref 3–16)
AST SERPL-CCNC: 20 U/L (ref 15–37)
BASOPHILS ABSOLUTE: 0 K/UL (ref 0–0.2)
BASOPHILS RELATIVE PERCENT: 0.8 %
BILIRUB SERPL-MCNC: 0.7 MG/DL (ref 0–1)
BUN BLDV-MCNC: 21 MG/DL (ref 7–20)
CALCIUM SERPL-MCNC: 9.1 MG/DL (ref 8.3–10.6)
CHLORIDE BLD-SCNC: 104 MMOL/L (ref 99–110)
CHOLESTEROL, TOTAL: 191 MG/DL (ref 0–199)
CO2: 22 MMOL/L (ref 21–32)
CREAT SERPL-MCNC: 0.8 MG/DL (ref 0.8–1.3)
EOSINOPHILS ABSOLUTE: 0.2 K/UL (ref 0–0.6)
EOSINOPHILS RELATIVE PERCENT: 2.6 %
GFR AFRICAN AMERICAN: >60
GFR NON-AFRICAN AMERICAN: >60
GLOBULIN: 2.2 G/DL
GLUCOSE BLD-MCNC: 123 MG/DL (ref 70–99)
HCT VFR BLD CALC: 47.6 % (ref 40.5–52.5)
HDLC SERPL-MCNC: 47 MG/DL (ref 40–60)
HEMOGLOBIN: 16.5 G/DL (ref 13.5–17.5)
LDL CHOLESTEROL CALCULATED: 107 MG/DL
LYMPHOCYTES ABSOLUTE: 2 K/UL (ref 1–5.1)
LYMPHOCYTES RELATIVE PERCENT: 32.8 %
MCH RBC QN AUTO: 32.3 PG (ref 26–34)
MCHC RBC AUTO-ENTMCNC: 34.7 G/DL (ref 31–36)
MCV RBC AUTO: 93 FL (ref 80–100)
MONOCYTES ABSOLUTE: 0.5 K/UL (ref 0–1.3)
MONOCYTES RELATIVE PERCENT: 8.1 %
NEUTROPHILS ABSOLUTE: 3.5 K/UL (ref 1.7–7.7)
NEUTROPHILS RELATIVE PERCENT: 55.7 %
PDW BLD-RTO: 14.2 % (ref 12.4–15.4)
PLATELET # BLD: 205 K/UL (ref 135–450)
PMV BLD AUTO: 9.6 FL (ref 5–10.5)
POTASSIUM SERPL-SCNC: 4.5 MMOL/L (ref 3.5–5.1)
PROSTATE SPECIFIC ANTIGEN: 2.8 NG/ML (ref 0–4)
RBC # BLD: 5.12 M/UL (ref 4.2–5.9)
SODIUM BLD-SCNC: 143 MMOL/L (ref 136–145)
TOTAL PROTEIN: 6.6 G/DL (ref 6.4–8.2)
TRIGL SERPL-MCNC: 184 MG/DL (ref 0–150)
TSH REFLEX: 1.12 UIU/ML (ref 0.27–4.2)
VLDLC SERPL CALC-MCNC: 37 MG/DL
WBC # BLD: 6.2 K/UL (ref 4–11)

## 2018-04-03 ENCOUNTER — HOSPITAL ENCOUNTER (OUTPATIENT)
Dept: NON INVASIVE DIAGNOSTICS | Age: 63
Discharge: HOME | End: 2018-04-03
Attending: INTERNAL MEDICINE | Admitting: INTERNAL MEDICINE

## 2018-04-03 DIAGNOSIS — R73.9 HYPERGLYCEMIA: Primary | ICD-10-CM

## 2018-04-03 DIAGNOSIS — R01.1 CARDIAC MURMUR: ICD-10-CM

## 2018-04-03 LAB
LV EF: 58 %
LVEF MODALITY: NORMAL

## 2018-04-04 DIAGNOSIS — I35.9 AORTIC VALVE DEFECT: Primary | ICD-10-CM

## 2018-04-04 LAB
ESTIMATED AVERAGE GLUCOSE: 102.5 MG/DL
HBA1C MFR BLD: 5.2 %

## 2018-04-26 ENCOUNTER — OFFICE VISIT (OUTPATIENT)
Dept: CARDIOLOGY CLINIC | Age: 63
End: 2018-04-26

## 2018-04-26 VITALS — SYSTOLIC BLOOD PRESSURE: 130 MMHG | DIASTOLIC BLOOD PRESSURE: 64 MMHG | WEIGHT: 315 LBS | BODY MASS INDEX: 50.7 KG/M2

## 2018-04-26 DIAGNOSIS — E66.01 MORBID OBESITY WITH BMI OF 50.0-59.9, ADULT (HCC): ICD-10-CM

## 2018-04-26 DIAGNOSIS — I35.1 NONRHEUMATIC AORTIC VALVE INSUFFICIENCY: Primary | ICD-10-CM

## 2018-04-26 DIAGNOSIS — I10 ESSENTIAL HYPERTENSION: ICD-10-CM

## 2018-04-26 DIAGNOSIS — R00.2 PALPITATIONS: ICD-10-CM

## 2018-04-26 DIAGNOSIS — I35.0 NONRHEUMATIC AORTIC VALVE STENOSIS: ICD-10-CM

## 2018-04-26 DIAGNOSIS — E78.2 MIXED HYPERLIPIDEMIA: ICD-10-CM

## 2018-04-26 DIAGNOSIS — R94.31 ABNORMAL ECG: ICD-10-CM

## 2018-04-26 PROCEDURE — 93000 ELECTROCARDIOGRAM COMPLETE: CPT | Performed by: INTERNAL MEDICINE

## 2018-04-26 PROCEDURE — 3017F COLORECTAL CA SCREEN DOC REV: CPT | Performed by: INTERNAL MEDICINE

## 2018-04-26 PROCEDURE — G8427 DOCREV CUR MEDS BY ELIG CLIN: HCPCS | Performed by: INTERNAL MEDICINE

## 2018-04-26 PROCEDURE — G8417 CALC BMI ABV UP PARAM F/U: HCPCS | Performed by: INTERNAL MEDICINE

## 2018-04-26 PROCEDURE — 99204 OFFICE O/P NEW MOD 45 MIN: CPT | Performed by: INTERNAL MEDICINE

## 2018-04-26 PROCEDURE — 1036F TOBACCO NON-USER: CPT | Performed by: INTERNAL MEDICINE

## 2018-05-01 ENCOUNTER — OFFICE VISIT (OUTPATIENT)
Dept: ENT CLINIC | Age: 63
End: 2018-05-01

## 2018-05-01 VITALS
BODY MASS INDEX: 40.43 KG/M2 | DIASTOLIC BLOOD PRESSURE: 70 MMHG | SYSTOLIC BLOOD PRESSURE: 143 MMHG | TEMPERATURE: 97.1 F | HEART RATE: 62 BPM | HEIGHT: 74 IN | WEIGHT: 315 LBS

## 2018-05-01 DIAGNOSIS — J38.7 LESION OF LARYNX: Primary | ICD-10-CM

## 2018-05-01 PROCEDURE — 3017F COLORECTAL CA SCREEN DOC REV: CPT | Performed by: OTOLARYNGOLOGY

## 2018-05-01 PROCEDURE — G8417 CALC BMI ABV UP PARAM F/U: HCPCS | Performed by: OTOLARYNGOLOGY

## 2018-05-01 PROCEDURE — G8427 DOCREV CUR MEDS BY ELIG CLIN: HCPCS | Performed by: OTOLARYNGOLOGY

## 2018-05-01 PROCEDURE — 31575 DIAGNOSTIC LARYNGOSCOPY: CPT | Performed by: OTOLARYNGOLOGY

## 2018-05-01 PROCEDURE — 99243 OFF/OP CNSLTJ NEW/EST LOW 30: CPT | Performed by: OTOLARYNGOLOGY

## 2018-05-08 ENCOUNTER — HOSPITAL ENCOUNTER (OUTPATIENT)
Dept: NON INVASIVE DIAGNOSTICS | Age: 63
Discharge: HOME | End: 2018-05-08
Attending: INTERNAL MEDICINE | Admitting: INTERNAL MEDICINE

## 2018-05-08 DIAGNOSIS — E66.01 MORBID OBESITY WITH BMI OF 50.0-59.9, ADULT (HCC): ICD-10-CM

## 2018-05-08 DIAGNOSIS — R00.2 PALPITATIONS: ICD-10-CM

## 2018-05-08 DIAGNOSIS — E78.2 MIXED HYPERLIPIDEMIA: ICD-10-CM

## 2018-05-08 DIAGNOSIS — I35.1 NONRHEUMATIC AORTIC (VALVE) INSUFFICIENCY: ICD-10-CM

## 2018-05-08 DIAGNOSIS — I35.1 NONRHEUMATIC AORTIC VALVE INSUFFICIENCY: ICD-10-CM

## 2018-05-08 DIAGNOSIS — I35.0 NONRHEUMATIC AORTIC (VALVE) STENOSIS: ICD-10-CM

## 2018-05-08 DIAGNOSIS — I10 ESSENTIAL HYPERTENSION: ICD-10-CM

## 2018-05-08 LAB
LV EF: 52 %
LVEF MODALITY: NORMAL

## 2018-05-08 RX ORDER — SODIUM CHLORIDE 0.9 % (FLUSH) 0.9 %
10 SYRINGE (ML) INJECTION PRN
Status: DISCONTINUED | OUTPATIENT
Start: 2018-05-08 | End: 2018-05-13 | Stop reason: HOSPADM

## 2018-05-08 RX ADMIN — Medication 10 ML: at 08:58

## 2018-05-08 RX ADMIN — Medication 10 ML: at 10:32

## 2018-06-08 RX ORDER — FENOFIBRATE 145 MG/1
TABLET, COATED ORAL
Qty: 120 TABLET | Refills: 1 | Status: SHIPPED | OUTPATIENT
Start: 2018-06-08 | End: 2019-01-11 | Stop reason: SDUPTHER

## 2018-06-08 RX ORDER — ATORVASTATIN CALCIUM 40 MG/1
TABLET, FILM COATED ORAL
Qty: 120 TABLET | Refills: 1 | Status: SHIPPED | OUTPATIENT
Start: 2018-06-08 | End: 2019-01-11 | Stop reason: SDUPTHER

## 2018-06-08 RX ORDER — LISINOPRIL 10 MG/1
TABLET ORAL
Qty: 120 TABLET | Refills: 1 | Status: SHIPPED | OUTPATIENT
Start: 2018-06-08 | End: 2019-01-11 | Stop reason: SDUPTHER

## 2018-07-03 ENCOUNTER — OFFICE VISIT (OUTPATIENT)
Dept: INTERNAL MEDICINE | Age: 63
End: 2018-07-03

## 2018-07-03 VITALS
SYSTOLIC BLOOD PRESSURE: 118 MMHG | DIASTOLIC BLOOD PRESSURE: 78 MMHG | HEIGHT: 72 IN | RESPIRATION RATE: 16 BRPM | BODY MASS INDEX: 42.66 KG/M2 | WEIGHT: 315 LBS | HEART RATE: 66 BPM

## 2018-07-03 DIAGNOSIS — G47.30 SLEEP APNEA, UNSPECIFIED TYPE: ICD-10-CM

## 2018-07-03 DIAGNOSIS — I35.1 NONRHEUMATIC AORTIC VALVE INSUFFICIENCY: ICD-10-CM

## 2018-07-03 DIAGNOSIS — I35.0 NONRHEUMATIC AORTIC VALVE STENOSIS: ICD-10-CM

## 2018-07-03 DIAGNOSIS — K21.00 REFLUX ESOPHAGITIS: ICD-10-CM

## 2018-07-03 DIAGNOSIS — R00.2 PALPITATIONS: ICD-10-CM

## 2018-07-03 DIAGNOSIS — E78.2 MIXED HYPERLIPIDEMIA: ICD-10-CM

## 2018-07-03 DIAGNOSIS — R35.1 BENIGN PROSTATIC HYPERPLASIA WITH NOCTURIA: ICD-10-CM

## 2018-07-03 DIAGNOSIS — I10 ESSENTIAL HYPERTENSION: Primary | ICD-10-CM

## 2018-07-03 DIAGNOSIS — E66.01 MORBID OBESITY WITH BMI OF 50.0-59.9, ADULT (HCC): ICD-10-CM

## 2018-07-03 DIAGNOSIS — N40.1 BENIGN PROSTATIC HYPERPLASIA WITH NOCTURIA: ICD-10-CM

## 2018-07-03 PROCEDURE — 3017F COLORECTAL CA SCREEN DOC REV: CPT | Performed by: INTERNAL MEDICINE

## 2018-07-03 PROCEDURE — 99214 OFFICE O/P EST MOD 30 MIN: CPT | Performed by: INTERNAL MEDICINE

## 2018-07-03 PROCEDURE — G8427 DOCREV CUR MEDS BY ELIG CLIN: HCPCS | Performed by: INTERNAL MEDICINE

## 2018-07-03 PROCEDURE — G8417 CALC BMI ABV UP PARAM F/U: HCPCS | Performed by: INTERNAL MEDICINE

## 2018-07-03 PROCEDURE — 1036F TOBACCO NON-USER: CPT | Performed by: INTERNAL MEDICINE

## 2018-07-03 RX ORDER — ESOMEPRAZOLE MAGNESIUM 40 MG/1
40 CAPSULE, DELAYED RELEASE ORAL
Qty: 90 CAPSULE | Refills: 2 | Status: SHIPPED | OUTPATIENT
Start: 2018-07-03 | End: 2018-10-08 | Stop reason: SDUPTHER

## 2018-07-03 ASSESSMENT — ENCOUNTER SYMPTOMS
ROS SKIN COMMENTS: NEEDS TO SEE DERM
CHEST TIGHTNESS: 0
WHEEZING: 0
ALLERGIC/IMMUNOLOGIC NEGATIVE: 1
SHORTNESS OF BREATH: 0
EYES NEGATIVE: 1
APNEA: 1

## 2018-07-03 NOTE — PROGRESS NOTES
Subjective:      Patient ID: Bella Low is a 66 y.o. male. HPI  HPI    Allergies   Allergen Reactions    Chocolate     Dairy Digestive [Lactase]     Other      dairy    Penicillins        Current Outpatient Prescriptions   Medication Sig Dispense Refill    esomeprazole (NEXIUM) 40 MG delayed release capsule Take 1 capsule by mouth every morning (before breakfast) 90 capsule 2    atorvastatin (LIPITOR) 40 MG tablet TAKE 1 TABLET BY MOUTH  DAILY 120 tablet 1    fenofibrate (TRICOR) 145 MG tablet TAKE 1 TABLET BY MOUTH  DAILY 120 tablet 1    lisinopril (PRINIVIL;ZESTRIL) 10 MG tablet TAKE 1 TABLET BY MOUTH  DAILY 120 tablet 1    cetirizine (ZYRTEC) 10 MG tablet Take 10 mg by mouth as needed for Allergies      fluticasone (FLONASE) 50 MCG/ACT nasal spray 1 spray by Nasal route daily      CIALIS 20 MG tablet TAKE AS DIRECTED 6 tablet 0    Cholecalciferol (VITAMIN D) 1000 UNIT CAPS Take 2,000 Units by mouth. No current facility-administered medications for this visit. Past Medical History:   Diagnosis Date    Apnea     Hyperlipidemia     Kidney stone on right side 1/2016    passed ok     Syncope 10/2004    Unspecified sleep apnea     using C-PAP       Family History   Problem Relation Age of Onset    Cancer Mother         ovarian    Other Father         Pulmonary Fibrosis       Past Surgical History:   Procedure Laterality Date    COLONOSCOPY  01/26/2004    POLYP    COLONOSCOPY  12/13/2016    polyps Dr. Tina Parks repeat in 2020    ENDOSCOPY, COLON, [address], [address]    [place]    JOINT REPLACEMENT      left knee    JOINT REPLACEMENT Left 11/19/13    L TKR Dr Marina Samano Right 11/2014    R TKR Dr Miky Allan Marital status:      Spouse name: N/A    Number of children: N/A    Years of education: N/A     Occupational History    Not on file.      Social History Main and oriented to person, place, and time. He has normal reflexes. Skin: Skin is warm and dry. Psychiatric: He has a normal mood and affect. His behavior is normal.       /78 (Site: Right Arm, Position: Sitting, Cuff Size: Medium Adult)   Pulse 66   Resp 16   Ht 6' (1.829 m)   Wt (!) 360 lb (163.3 kg)   BMI 48.82 kg/m²       Assessment & Plan:          Nonrheumatic aortic valve stenosis  As per Dr Veronica Adkins will follow up in 6 mo     Nonrheumatic aortic valve insufficiency  Cont to F/U as noted     BPH (benign prostatic hyperplasia)  On and off c/o     Essential hypertension  Stable continue current meds and return in 3 mo. Mixed hyperlipidemia  Stable continue current meds and return in 3 mo.         Morbid obesity with BMI of 50.0-59.9, adult (HCC)  Cont to diet and exercise     Palpitations  Improved     Reflux esophagitis  Controled with diet and meds     Sleep apnea  Cont to use C-pap

## 2018-09-27 PROBLEM — Z00.00 WELL ADULT EXAM: Status: RESOLVED | Noted: 2017-01-17 | Resolved: 2018-09-27

## 2018-10-02 DIAGNOSIS — E78.2 MIXED HYPERLIPIDEMIA: ICD-10-CM

## 2018-10-02 DIAGNOSIS — R00.2 PALPITATIONS: ICD-10-CM

## 2018-10-02 DIAGNOSIS — G47.30 SLEEP APNEA, UNSPECIFIED TYPE: ICD-10-CM

## 2018-10-02 DIAGNOSIS — I10 ESSENTIAL HYPERTENSION: ICD-10-CM

## 2018-10-02 DIAGNOSIS — I35.0 NONRHEUMATIC AORTIC VALVE STENOSIS: ICD-10-CM

## 2018-10-02 DIAGNOSIS — I35.1 NONRHEUMATIC AORTIC VALVE INSUFFICIENCY: ICD-10-CM

## 2018-10-02 DIAGNOSIS — R35.1 BENIGN PROSTATIC HYPERPLASIA WITH NOCTURIA: ICD-10-CM

## 2018-10-02 DIAGNOSIS — E66.01 MORBID OBESITY WITH BMI OF 50.0-59.9, ADULT (HCC): ICD-10-CM

## 2018-10-02 DIAGNOSIS — N40.1 BENIGN PROSTATIC HYPERPLASIA WITH NOCTURIA: ICD-10-CM

## 2018-10-02 DIAGNOSIS — K21.00 REFLUX ESOPHAGITIS: ICD-10-CM

## 2018-10-02 LAB
A/G RATIO: 2.3 (ref 1.1–2.2)
ALBUMIN SERPL-MCNC: 4.5 G/DL (ref 3.4–5)
ALP BLD-CCNC: 47 U/L (ref 40–129)
ALT SERPL-CCNC: 26 U/L (ref 10–40)
ANION GAP SERPL CALCULATED.3IONS-SCNC: 16 MMOL/L (ref 3–16)
AST SERPL-CCNC: 27 U/L (ref 15–37)
BASOPHILS ABSOLUTE: 0 K/UL (ref 0–0.2)
BASOPHILS RELATIVE PERCENT: 0.7 %
BILIRUB SERPL-MCNC: 0.8 MG/DL (ref 0–1)
BUN BLDV-MCNC: 19 MG/DL (ref 7–20)
CALCIUM SERPL-MCNC: 9.5 MG/DL (ref 8.3–10.6)
CHLORIDE BLD-SCNC: 105 MMOL/L (ref 99–110)
CHOLESTEROL, TOTAL: 185 MG/DL (ref 0–199)
CO2: 21 MMOL/L (ref 21–32)
CREAT SERPL-MCNC: 0.9 MG/DL (ref 0.8–1.3)
EOSINOPHILS ABSOLUTE: 0.1 K/UL (ref 0–0.6)
EOSINOPHILS RELATIVE PERCENT: 1.3 %
GFR AFRICAN AMERICAN: >60
GFR NON-AFRICAN AMERICAN: >60
GLOBULIN: 2 G/DL
GLUCOSE BLD-MCNC: 122 MG/DL (ref 70–99)
HCT VFR BLD CALC: 49.9 % (ref 40.5–52.5)
HDLC SERPL-MCNC: 44 MG/DL (ref 40–60)
HEMOGLOBIN: 17 G/DL (ref 13.5–17.5)
LDL CHOLESTEROL CALCULATED: 108 MG/DL
LYMPHOCYTES ABSOLUTE: 1.9 K/UL (ref 1–5.1)
LYMPHOCYTES RELATIVE PERCENT: 29.2 %
MCH RBC QN AUTO: 32.8 PG (ref 26–34)
MCHC RBC AUTO-ENTMCNC: 34.1 G/DL (ref 31–36)
MCV RBC AUTO: 96.2 FL (ref 80–100)
MONOCYTES ABSOLUTE: 0.6 K/UL (ref 0–1.3)
MONOCYTES RELATIVE PERCENT: 8.8 %
NEUTROPHILS ABSOLUTE: 3.8 K/UL (ref 1.7–7.7)
NEUTROPHILS RELATIVE PERCENT: 60 %
PDW BLD-RTO: 13.9 % (ref 12.4–15.4)
PLATELET # BLD: 192 K/UL (ref 135–450)
PMV BLD AUTO: 9.9 FL (ref 5–10.5)
POTASSIUM SERPL-SCNC: 4.5 MMOL/L (ref 3.5–5.1)
RBC # BLD: 5.19 M/UL (ref 4.2–5.9)
SODIUM BLD-SCNC: 142 MMOL/L (ref 136–145)
TOTAL PROTEIN: 6.5 G/DL (ref 6.4–8.2)
TRIGL SERPL-MCNC: 163 MG/DL (ref 0–150)
TSH REFLEX: 1.4 UIU/ML (ref 0.27–4.2)
VLDLC SERPL CALC-MCNC: 33 MG/DL
WBC # BLD: 6.4 K/UL (ref 4–11)

## 2018-10-08 ENCOUNTER — OFFICE VISIT (OUTPATIENT)
Dept: INTERNAL MEDICINE CLINIC | Age: 63
End: 2018-10-08
Payer: COMMERCIAL

## 2018-10-08 VITALS
SYSTOLIC BLOOD PRESSURE: 118 MMHG | WEIGHT: 315 LBS | BODY MASS INDEX: 42.66 KG/M2 | HEIGHT: 72 IN | RESPIRATION RATE: 16 BRPM | HEART RATE: 66 BPM | DIASTOLIC BLOOD PRESSURE: 78 MMHG

## 2018-10-08 DIAGNOSIS — N40.1 BENIGN PROSTATIC HYPERPLASIA WITH NOCTURIA: ICD-10-CM

## 2018-10-08 DIAGNOSIS — R00.2 PALPITATIONS: ICD-10-CM

## 2018-10-08 DIAGNOSIS — I10 ESSENTIAL HYPERTENSION: ICD-10-CM

## 2018-10-08 DIAGNOSIS — R73.9 HYPERGLYCEMIA: ICD-10-CM

## 2018-10-08 DIAGNOSIS — E66.01 MORBID OBESITY WITH BMI OF 50.0-59.9, ADULT (HCC): ICD-10-CM

## 2018-10-08 DIAGNOSIS — E78.2 MIXED HYPERLIPIDEMIA: ICD-10-CM

## 2018-10-08 DIAGNOSIS — K21.00 REFLUX ESOPHAGITIS: ICD-10-CM

## 2018-10-08 DIAGNOSIS — R35.1 BENIGN PROSTATIC HYPERPLASIA WITH NOCTURIA: ICD-10-CM

## 2018-10-08 DIAGNOSIS — G25.81 RLS (RESTLESS LEGS SYNDROME): ICD-10-CM

## 2018-10-08 DIAGNOSIS — Z23 NEED FOR INFLUENZA VACCINATION: Primary | ICD-10-CM

## 2018-10-08 DIAGNOSIS — G47.30 SLEEP APNEA, UNSPECIFIED TYPE: ICD-10-CM

## 2018-10-08 DIAGNOSIS — I35.0 NONRHEUMATIC AORTIC VALVE STENOSIS: ICD-10-CM

## 2018-10-08 PROCEDURE — G8427 DOCREV CUR MEDS BY ELIG CLIN: HCPCS | Performed by: INTERNAL MEDICINE

## 2018-10-08 PROCEDURE — G8417 CALC BMI ABV UP PARAM F/U: HCPCS | Performed by: INTERNAL MEDICINE

## 2018-10-08 PROCEDURE — 90686 IIV4 VACC NO PRSV 0.5 ML IM: CPT | Performed by: INTERNAL MEDICINE

## 2018-10-08 PROCEDURE — 99214 OFFICE O/P EST MOD 30 MIN: CPT | Performed by: INTERNAL MEDICINE

## 2018-10-08 PROCEDURE — 1036F TOBACCO NON-USER: CPT | Performed by: INTERNAL MEDICINE

## 2018-10-08 PROCEDURE — 3017F COLORECTAL CA SCREEN DOC REV: CPT | Performed by: INTERNAL MEDICINE

## 2018-10-08 PROCEDURE — 90471 IMMUNIZATION ADMIN: CPT | Performed by: INTERNAL MEDICINE

## 2018-10-08 PROCEDURE — G8482 FLU IMMUNIZE ORDER/ADMIN: HCPCS | Performed by: INTERNAL MEDICINE

## 2018-10-08 RX ORDER — ESOMEPRAZOLE MAGNESIUM 40 MG/1
40 CAPSULE, DELAYED RELEASE ORAL
Qty: 90 CAPSULE | Refills: 2 | Status: SHIPPED | OUTPATIENT
Start: 2018-10-08 | End: 2019-12-10 | Stop reason: SDUPTHER

## 2018-10-08 RX ORDER — CLINDAMYCIN HYDROCHLORIDE 300 MG/1
300 CAPSULE ORAL 3 TIMES DAILY
COMMUNITY

## 2018-10-08 ASSESSMENT — PATIENT HEALTH QUESTIONNAIRE - PHQ9
2. FEELING DOWN, DEPRESSED OR HOPELESS: 0
SUM OF ALL RESPONSES TO PHQ9 QUESTIONS 1 & 2: 0
1. LITTLE INTEREST OR PLEASURE IN DOING THINGS: 0
SUM OF ALL RESPONSES TO PHQ QUESTIONS 1-9: 0
SUM OF ALL RESPONSES TO PHQ QUESTIONS 1-9: 0

## 2018-10-08 ASSESSMENT — ENCOUNTER SYMPTOMS
EYES NEGATIVE: 1
ALLERGIC/IMMUNOLOGIC NEGATIVE: 1
WHEEZING: 0
CHEST TIGHTNESS: 0
APNEA: 1
ROS SKIN COMMENTS: NEEDS TO SEE DERM
SHORTNESS OF BREATH: 0

## 2018-10-08 NOTE — PROGRESS NOTES
Vaccine Information Sheet, \"Influenza - Inactivated\"  given to Lum Profit IV, or parent/legal guardian of  Isac Schuler and verbalized understanding. Patient responses:    Have you ever had a reaction to a flu vaccine? No  Are you able to eat eggs without adverse effects? Yes  Do you have any current illness? No  Have you ever had [place] Syndrome? No    Flu vaccine given per order. Please see immunization tab.

## 2018-10-08 NOTE — PROGRESS NOTES
tablet 1    fenofibrate (TRICOR) 145 MG tablet TAKE 1 TABLET BY MOUTH  DAILY 120 tablet 1    lisinopril (PRINIVIL;ZESTRIL) 10 MG tablet TAKE 1 TABLET BY MOUTH  DAILY 120 tablet 1    cetirizine (ZYRTEC) 10 MG tablet Take 10 mg by mouth as needed for Allergies      CIALIS 20 MG tablet TAKE AS DIRECTED 6 tablet 0    Cholecalciferol (VITAMIN D) 1000 UNIT CAPS Take 2,000 Units by mouth. No current facility-administered medications for this visit. Past Medical History:   Diagnosis Date    Apnea     Hyperlipidemia     Kidney stone on right side 1/2016    passed ok     Syncope 10/2004    Unspecified sleep apnea     using C-PAP       Family History   Problem Relation Age of Onset    Cancer Mother         ovarian    Other Father         Pulmonary Fibrosis       Past Surgical History:   Procedure Laterality Date    COLONOSCOPY  01/26/2004    POLYP    COLONOSCOPY  12/13/2016    polyps Dr. Meléndez Innocent repeat in 2020    ENDOSCOPY, COLON, [address]    [place]    JOINT REPLACEMENT      left knee    JOINT REPLACEMENT Left 11/19/13    L TKR Dr Palomo Blas Right 11/2014    R TKR Dr Cristo Stout Marital status:      Spouse name: N/A    Number of children: N/A    Years of education: N/A     Occupational History    Not on file. Social History Main Topics    Smoking status: Never Smoker    Smokeless tobacco: Never Used    Alcohol use Yes      Comment: occ    Drug use: No    Sexual activity: Not on file     Other Topics Concern    Not on file     Social History Narrative    ** Merged History Encounter **            Review of Systems  Review of Systems   Constitutional: Negative. Negative for chills, diaphoresis and unexpected weight change (up a little ). HENT: Negative. Eyes: Negative. Respiratory: Positive for apnea (improved using C-pap ).  Negative for chest tightness, noted     Nonrheumatic aortic valve stenosis  Cont to watch  And F/U with Dr Kentrell Tejada obesity with BMI of 50.0-59.9, adult ([address]  Cont diet and exercise     Mixed hyperlipidemia  Cont diet and exercise Repeat labs in 3 mo. Essential hypertension  Stable continue current meds and return in 3 mo.         BPH (benign prostatic hyperplasia)  Cont to warch     Hyperglycemia  Diet controled

## 2018-10-24 ENCOUNTER — OFFICE VISIT (OUTPATIENT)
Dept: CARDIOLOGY CLINIC | Age: 63
End: 2018-10-24
Payer: COMMERCIAL

## 2018-10-24 VITALS
HEIGHT: 72 IN | OXYGEN SATURATION: 96 % | SYSTOLIC BLOOD PRESSURE: 128 MMHG | BODY MASS INDEX: 42.66 KG/M2 | WEIGHT: 315 LBS | HEART RATE: 61 BPM | DIASTOLIC BLOOD PRESSURE: 72 MMHG | RESPIRATION RATE: 16 BRPM

## 2018-10-24 DIAGNOSIS — I35.0 NONRHEUMATIC AORTIC VALVE STENOSIS: Primary | ICD-10-CM

## 2018-10-24 DIAGNOSIS — E66.01 MORBID OBESITY WITH BMI OF 50.0-59.9, ADULT (HCC): ICD-10-CM

## 2018-10-24 DIAGNOSIS — I10 ESSENTIAL HYPERTENSION: ICD-10-CM

## 2018-10-24 DIAGNOSIS — I35.1 NONRHEUMATIC AORTIC VALVE INSUFFICIENCY: ICD-10-CM

## 2018-10-24 PROCEDURE — G8427 DOCREV CUR MEDS BY ELIG CLIN: HCPCS | Performed by: INTERNAL MEDICINE

## 2018-10-24 PROCEDURE — G8417 CALC BMI ABV UP PARAM F/U: HCPCS | Performed by: INTERNAL MEDICINE

## 2018-10-24 PROCEDURE — G8482 FLU IMMUNIZE ORDER/ADMIN: HCPCS | Performed by: INTERNAL MEDICINE

## 2018-10-24 PROCEDURE — 99214 OFFICE O/P EST MOD 30 MIN: CPT | Performed by: INTERNAL MEDICINE

## 2018-10-24 PROCEDURE — 3017F COLORECTAL CA SCREEN DOC REV: CPT | Performed by: INTERNAL MEDICINE

## 2018-10-24 PROCEDURE — 1036F TOBACCO NON-USER: CPT | Performed by: INTERNAL MEDICINE

## 2018-10-24 NOTE — PROGRESS NOTES
Psychological: No anxiety or depression  Hematological and Lymphatic: No abnormal bleeding or bruising, blood clots, jaundice. Endocrine: No malaise/lethargy, palpitations, polydipsia/polyuria, temperature intolerance or unexpected weight changes. Skin: No rashes or non-healing ulcers. PE:  /72 (Site: Right Upper Arm, Position: Sitting)   Pulse 61   Resp 16   Ht 6' (1.829 m)   Wt (!) 361 lb 9.6 oz (164 kg)   SpO2 96%   BMI 49.04 kg/m²   General (appearance): Well devel. No distress. Eyes: Anicteric, eomi  Neck: Supple. No JVD  Ears/Nose/Mouth/Thorat: No cyanosis  CV: Well devel. No distress. Respiratory:  Clear B, normal respiratory effort  GI: Abd soft. NT. No peritoneal signs  Skin: Warm, dry. No rashes  Neuro/Psych: Alert and oriented x 3. Appropriate behavior  Ext:  No c/c.  No edema  Pulses:  2+ radial and carotid    Lab Results   Component Value Date    WBC 6.4 10/02/2018    HGB 17.0 10/02/2018    HCT 49.9 10/02/2018    MCV 96.2 10/02/2018     10/02/2018       Chemistry        Component Value Date/Time     10/02/2018 0910    K 4.5 10/02/2018 0910     10/02/2018 0910    CO2 21 10/02/2018 0910    BUN 19 10/02/2018 0910    CREATININE 0.9 10/02/2018 0910        Component Value Date/Time    CALCIUM 9.5 10/02/2018 0910    ALKPHOS 47 10/02/2018 0910    AST 27 10/02/2018 0910    ALT 26 10/02/2018 0910    BILITOT 0.8 10/02/2018 0910        Lab Results   Component Value Date    INR 0.94 10/18/2014    PROTIME 10.1 10/18/2014     Lab Results   Component Value Date    CHOL 185 10/02/2018    CHOL 191 04/02/2018    CHOL 199 10/24/2017     Lab Results   Component Value Date    TRIG 163 (H) 10/02/2018    TRIG 184 (H) 04/02/2018    TRIG 184 (H) 10/24/2017     Lab Results   Component Value Date    HDL 44 10/02/2018    HDL 47 04/02/2018    HDL 45 10/24/2017     Lab Results   Component Value Date    LDLCALC 108 (H) 10/02/2018    LDLCALC 107 (H) 04/02/2018    LDLCALC 117 (H) 10/24/2017

## 2018-11-14 ENCOUNTER — HOSPITAL ENCOUNTER (OUTPATIENT)
Dept: NON INVASIVE DIAGNOSTICS | Age: 63
Discharge: HOME | End: 2018-11-14
Payer: COMMERCIAL

## 2018-11-14 LAB
LV EF: 60 %
LVEF MODALITY: NORMAL

## 2018-11-14 PROCEDURE — 6360000004 HC RX CONTRAST MEDICATION: Performed by: INTERNAL MEDICINE

## 2018-11-14 PROCEDURE — C8929 TTE W OR WO FOL WCON,DOPPLER: HCPCS

## 2018-11-14 RX ADMIN — PERFLUTREN 2.2 MG: 6.52 INJECTION, SUSPENSION INTRAVENOUS at 10:40

## 2018-12-31 DIAGNOSIS — E66.01 MORBID OBESITY WITH BMI OF 50.0-59.9, ADULT (HCC): ICD-10-CM

## 2018-12-31 DIAGNOSIS — G25.81 RLS (RESTLESS LEGS SYNDROME): ICD-10-CM

## 2018-12-31 DIAGNOSIS — E78.2 MIXED HYPERLIPIDEMIA: ICD-10-CM

## 2018-12-31 DIAGNOSIS — N40.1 BENIGN PROSTATIC HYPERPLASIA WITH NOCTURIA: ICD-10-CM

## 2018-12-31 DIAGNOSIS — I35.0 NONRHEUMATIC AORTIC VALVE STENOSIS: ICD-10-CM

## 2018-12-31 DIAGNOSIS — R73.9 HYPERGLYCEMIA: ICD-10-CM

## 2018-12-31 DIAGNOSIS — I10 ESSENTIAL HYPERTENSION: ICD-10-CM

## 2018-12-31 DIAGNOSIS — G47.30 SLEEP APNEA, UNSPECIFIED TYPE: ICD-10-CM

## 2018-12-31 DIAGNOSIS — Z23 NEED FOR INFLUENZA VACCINATION: ICD-10-CM

## 2018-12-31 DIAGNOSIS — R00.2 PALPITATIONS: ICD-10-CM

## 2018-12-31 DIAGNOSIS — K21.00 REFLUX ESOPHAGITIS: ICD-10-CM

## 2018-12-31 DIAGNOSIS — R35.1 BENIGN PROSTATIC HYPERPLASIA WITH NOCTURIA: ICD-10-CM

## 2018-12-31 LAB
A/G RATIO: 1.9 (ref 1.1–2.2)
ALBUMIN SERPL-MCNC: 4.3 G/DL (ref 3.4–5)
ALP BLD-CCNC: 43 U/L (ref 40–129)
ALT SERPL-CCNC: 27 U/L (ref 10–40)
ANION GAP SERPL CALCULATED.3IONS-SCNC: 13 MMOL/L (ref 3–16)
AST SERPL-CCNC: 21 U/L (ref 15–37)
BASOPHILS ABSOLUTE: 0 K/UL (ref 0–0.2)
BASOPHILS RELATIVE PERCENT: 0.7 %
BILIRUB SERPL-MCNC: 0.7 MG/DL (ref 0–1)
BUN BLDV-MCNC: 16 MG/DL (ref 7–20)
CALCIUM SERPL-MCNC: 9.3 MG/DL (ref 8.3–10.6)
CHLORIDE BLD-SCNC: 106 MMOL/L (ref 99–110)
CHOLESTEROL, TOTAL: 185 MG/DL (ref 0–199)
CO2: 23 MMOL/L (ref 21–32)
CREAT SERPL-MCNC: 0.8 MG/DL (ref 0.8–1.3)
EOSINOPHILS ABSOLUTE: 0.1 K/UL (ref 0–0.6)
EOSINOPHILS RELATIVE PERCENT: 1.4 %
GFR AFRICAN AMERICAN: >60
GFR NON-AFRICAN AMERICAN: >60
GLOBULIN: 2.3 G/DL
GLUCOSE BLD-MCNC: 106 MG/DL (ref 70–99)
HCT VFR BLD CALC: 52.2 % (ref 40.5–52.5)
HDLC SERPL-MCNC: 46 MG/DL (ref 40–60)
HEMOGLOBIN: 17.6 G/DL (ref 13.5–17.5)
LDL CHOLESTEROL CALCULATED: 107 MG/DL
LYMPHOCYTES ABSOLUTE: 1.9 K/UL (ref 1–5.1)
LYMPHOCYTES RELATIVE PERCENT: 28.9 %
MCH RBC QN AUTO: 32.8 PG (ref 26–34)
MCHC RBC AUTO-ENTMCNC: 33.7 G/DL (ref 31–36)
MCV RBC AUTO: 97.4 FL (ref 80–100)
MONOCYTES ABSOLUTE: 0.5 K/UL (ref 0–1.3)
MONOCYTES RELATIVE PERCENT: 7.4 %
NEUTROPHILS ABSOLUTE: 4 K/UL (ref 1.7–7.7)
NEUTROPHILS RELATIVE PERCENT: 61.6 %
PDW BLD-RTO: 14.2 % (ref 12.4–15.4)
PLATELET # BLD: 185 K/UL (ref 135–450)
PMV BLD AUTO: 9.8 FL (ref 5–10.5)
POTASSIUM SERPL-SCNC: 4.4 MMOL/L (ref 3.5–5.1)
RBC # BLD: 5.36 M/UL (ref 4.2–5.9)
SODIUM BLD-SCNC: 142 MMOL/L (ref 136–145)
TOTAL PROTEIN: 6.6 G/DL (ref 6.4–8.2)
TRIGL SERPL-MCNC: 161 MG/DL (ref 0–150)
TSH REFLEX: 1.71 UIU/ML (ref 0.27–4.2)
VLDLC SERPL CALC-MCNC: 32 MG/DL
WBC # BLD: 6.5 K/UL (ref 4–11)

## 2019-01-01 LAB
ESTIMATED AVERAGE GLUCOSE: 105.4 MG/DL
HBA1C MFR BLD: 5.3 %

## 2019-01-11 RX ORDER — LISINOPRIL 10 MG/1
TABLET ORAL
Qty: 120 TABLET | Refills: 0 | Status: SHIPPED | OUTPATIENT
Start: 2019-01-11 | End: 2019-06-23 | Stop reason: SDUPTHER

## 2019-01-11 RX ORDER — ATORVASTATIN CALCIUM 40 MG/1
TABLET, FILM COATED ORAL
Qty: 120 TABLET | Refills: 0 | Status: SHIPPED | OUTPATIENT
Start: 2019-01-11 | End: 2019-04-17

## 2019-01-11 RX ORDER — FENOFIBRATE 145 MG/1
TABLET, COATED ORAL
Qty: 120 TABLET | Refills: 0 | Status: SHIPPED | OUTPATIENT
Start: 2019-01-11 | End: 2019-06-23 | Stop reason: SDUPTHER

## 2019-01-15 ENCOUNTER — OFFICE VISIT (OUTPATIENT)
Dept: INTERNAL MEDICINE CLINIC | Age: 64
End: 2019-01-15
Payer: COMMERCIAL

## 2019-01-15 VITALS
RESPIRATION RATE: 14 BRPM | SYSTOLIC BLOOD PRESSURE: 138 MMHG | DIASTOLIC BLOOD PRESSURE: 80 MMHG | HEIGHT: 72 IN | HEART RATE: 78 BPM | WEIGHT: 315 LBS | BODY MASS INDEX: 42.66 KG/M2

## 2019-01-15 DIAGNOSIS — N40.1 BENIGN PROSTATIC HYPERPLASIA WITH NOCTURIA: ICD-10-CM

## 2019-01-15 DIAGNOSIS — I35.0 NONRHEUMATIC AORTIC VALVE STENOSIS: ICD-10-CM

## 2019-01-15 DIAGNOSIS — G25.81 RLS (RESTLESS LEGS SYNDROME): ICD-10-CM

## 2019-01-15 DIAGNOSIS — I10 ESSENTIAL HYPERTENSION: ICD-10-CM

## 2019-01-15 DIAGNOSIS — G47.30 SLEEP APNEA, UNSPECIFIED TYPE: ICD-10-CM

## 2019-01-15 DIAGNOSIS — R35.1 BENIGN PROSTATIC HYPERPLASIA WITH NOCTURIA: ICD-10-CM

## 2019-01-15 DIAGNOSIS — K21.00 REFLUX ESOPHAGITIS: ICD-10-CM

## 2019-01-15 DIAGNOSIS — R00.2 PALPITATIONS: ICD-10-CM

## 2019-01-15 DIAGNOSIS — R73.9 HYPERGLYCEMIA: ICD-10-CM

## 2019-01-15 PROCEDURE — G8427 DOCREV CUR MEDS BY ELIG CLIN: HCPCS | Performed by: INTERNAL MEDICINE

## 2019-01-15 PROCEDURE — 99214 OFFICE O/P EST MOD 30 MIN: CPT | Performed by: INTERNAL MEDICINE

## 2019-01-15 PROCEDURE — G8482 FLU IMMUNIZE ORDER/ADMIN: HCPCS | Performed by: INTERNAL MEDICINE

## 2019-01-15 PROCEDURE — 3017F COLORECTAL CA SCREEN DOC REV: CPT | Performed by: INTERNAL MEDICINE

## 2019-01-15 PROCEDURE — G8417 CALC BMI ABV UP PARAM F/U: HCPCS | Performed by: INTERNAL MEDICINE

## 2019-01-15 PROCEDURE — 1036F TOBACCO NON-USER: CPT | Performed by: INTERNAL MEDICINE

## 2019-01-15 ASSESSMENT — ENCOUNTER SYMPTOMS
ALLERGIC/IMMUNOLOGIC NEGATIVE: 1
SHORTNESS OF BREATH: 0
ROS SKIN COMMENTS: NEEDS TO SEE DERM
CHEST TIGHTNESS: 0
WHEEZING: 0
APNEA: 1
EYES NEGATIVE: 1

## 2019-04-12 ENCOUNTER — TELEPHONE (OUTPATIENT)
Dept: INTERNAL MEDICINE CLINIC | Age: 64
End: 2019-04-12

## 2019-04-12 RX ORDER — AZITHROMYCIN 250 MG/1
TABLET, FILM COATED ORAL
Qty: 1 PACKET | Refills: 0 | Status: SHIPPED | OUTPATIENT
Start: 2019-04-12 | End: 2019-04-17 | Stop reason: ALTCHOICE

## 2019-04-12 NOTE — TELEPHONE ENCOUNTER
Symptoms since Sunday: low grade fever, runny & stuffy nose, some pressure behind face. Pt questions if he needs an antibiotic treat possible sinus infection.     [facility] [place] [id number] [facility] [phone]

## 2019-04-17 ENCOUNTER — OFFICE VISIT (OUTPATIENT)
Dept: CARDIOLOGY CLINIC | Age: 64
End: 2019-04-17
Payer: COMMERCIAL

## 2019-04-17 VITALS
WEIGHT: 315 LBS | DIASTOLIC BLOOD PRESSURE: 70 MMHG | HEART RATE: 66 BPM | HEIGHT: 74 IN | BODY MASS INDEX: 40.43 KG/M2 | SYSTOLIC BLOOD PRESSURE: 138 MMHG

## 2019-04-17 DIAGNOSIS — I35.0 NONRHEUMATIC AORTIC VALVE STENOSIS: ICD-10-CM

## 2019-04-17 DIAGNOSIS — E66.01 MORBID OBESITY WITH BMI OF 50.0-59.9, ADULT (HCC): ICD-10-CM

## 2019-04-17 DIAGNOSIS — I10 ESSENTIAL HYPERTENSION: ICD-10-CM

## 2019-04-17 DIAGNOSIS — I71.20 THORACIC AORTIC ANEURYSM WITHOUT RUPTURE: ICD-10-CM

## 2019-04-17 DIAGNOSIS — I35.1 NONRHEUMATIC AORTIC VALVE INSUFFICIENCY: Primary | ICD-10-CM

## 2019-04-17 DIAGNOSIS — E78.2 MIXED HYPERLIPIDEMIA: ICD-10-CM

## 2019-04-17 PROCEDURE — 1036F TOBACCO NON-USER: CPT | Performed by: INTERNAL MEDICINE

## 2019-04-17 PROCEDURE — 99214 OFFICE O/P EST MOD 30 MIN: CPT | Performed by: INTERNAL MEDICINE

## 2019-04-17 PROCEDURE — 93000 ELECTROCARDIOGRAM COMPLETE: CPT | Performed by: INTERNAL MEDICINE

## 2019-04-17 PROCEDURE — 3017F COLORECTAL CA SCREEN DOC REV: CPT | Performed by: INTERNAL MEDICINE

## 2019-04-17 PROCEDURE — G8417 CALC BMI ABV UP PARAM F/U: HCPCS | Performed by: INTERNAL MEDICINE

## 2019-04-17 PROCEDURE — G8427 DOCREV CUR MEDS BY ELIG CLIN: HCPCS | Performed by: INTERNAL MEDICINE

## 2019-04-17 RX ORDER — ATORVASTATIN CALCIUM 80 MG/1
80 TABLET, FILM COATED ORAL DAILY
Qty: 90 TABLET | Refills: 3 | Status: SHIPPED | OUTPATIENT
Start: 2019-04-17 | End: 2020-06-24

## 2019-04-17 RX ORDER — METOPROLOL SUCCINATE 25 MG/1
25 TABLET, EXTENDED RELEASE ORAL DAILY
Qty: 90 TABLET | Refills: 3 | Status: SHIPPED | OUTPATIENT
Start: 2019-04-17 | End: 2020-03-19

## 2019-04-17 NOTE — PROGRESS NOTES
CC/HPI:  No cp. SOB is baseline. Gets sob with heavier exertion, yardwork, etc.  No n/v/LH/dizziness. No syncope. Has some swelling in legs. No orthopnea. No PND. Has been taking mucinex D for congestion. Past Medical History:   Diagnosis Date    Apnea     Hyperlipidemia     Kidney stone on right side 1/2016    passed ok     Syncope 10/2004    Unspecified sleep apnea     using C-PAP     Past Surgical History:   Procedure Laterality Date    COLONOSCOPY  01/26/2004    POLYP    COLONOSCOPY  12/13/2016    polyps Dr. Rae Castaneda repeat in 2020    ENDOSCOPY, COLON, [address]      left knee    JOINT REPLACEMENT Left 11/19/13    L TKR Dr Jocelin Ozuna Right 11/2014    R TKR Dr Jorge Ray  03/23/2018    Dr. Ya Mchugh repeat in 2021     Social History     Tobacco Use    Smoking status: Never Smoker    Smokeless tobacco: Never Used   Substance Use Topics    Alcohol use: Yes     Comment: occ    Drug use: No     Allergies   Allergen Reactions    Chocolate     Dairy Digestive [Lactase]     Other      dairy    Penicillins      Family History   Problem Relation Age of Onset    Cancer Mother         ovarian    Other Father         Pulmonary Fibrosis     Review of Systems   General: No fevers, chills, fatigue, or night sweats. No abnormal changes in weight. HEENT:+ nasal congestion   Cardiovascular: See HPI. No cramping in legs or buttocks when walking. Respiratory: No cough, hemoptysis, or wheezing. No history of asthma. Gastrointestinal: No abdominal pain, hematochezia, melana. Genito-Urinary: No dysuria or hematuria. No urgency or polyuria. Musculoskeletal: No complaints of joint pain, joint swelling or muscular weakness/soreness. Neurological: No dizziness or headaches. No numbness/tingling, speech problems or weakness. No history of a stroke or TIA. Psychological: No anxiety or depression  Hematological and Lymphatic: No abnormal bleeding or bruising, blood clots, jaundice. Endocrine: No malaise/lethargy, palpitations, polydipsia/polyuria, temperature intolerance or unexpected weight changes. Skin: No rashes or non-healing ulcers. PE:  /70 (Site: Left Upper Arm, Position: Sitting, Cuff Size: Large Adult)   Pulse 66   Ht 6' 2\" (1.88 m)   Wt (!) 370 lb (167.8 kg)   BMI 47.51 kg/m²   General (appearance): Well devel. No distress. Eyes: Anicteric, eomi  Neck: Supple. No JVD  Ears/Nose/Mouth/Thorat: No cyanosis  CV: Well devel. No distress. Respiratory:  Clear B, normal respiratory effort  GI: Abd soft. NT. No peritoneal signs  Skin: Warm, dry. No rashes other htan brawny skin changes in LEs  Neuro/Psych: Alert and oriented x 3. Appropriate behavior  Ext:  No c/c.  Mildedema  Pulses:  2+ radial and carotid    Lab Results   Component Value Date    WBC 6.5 12/31/2018    HGB 17.6 (H) 12/31/2018    HCT 52.2 12/31/2018    MCV 97.4 12/31/2018     12/31/2018       Chemistry        Component Value Date/Time     12/31/2018 1000    K 4.4 12/31/2018 1000     12/31/2018 1000    CO2 23 12/31/2018 1000    BUN 16 12/31/2018 1000    CREATININE 0.8 12/31/2018 1000        Component Value Date/Time    CALCIUM 9.3 12/31/2018 1000    ALKPHOS 43 12/31/2018 1000    AST 21 12/31/2018 1000    ALT 27 12/31/2018 1000    BILITOT 0.7 12/31/2018 1000        Lab Results   Component Value Date    INR 0.94 10/18/2014    PROTIME 10.1 10/18/2014     Lab Results   Component Value Date    CHOL 185 12/31/2018    CHOL 185 10/02/2018    CHOL 191 04/02/2018     Lab Results   Component Value Date    TRIG 161 (H) 12/31/2018    TRIG 163 (H) 10/02/2018    TRIG 184 (H) 04/02/2018     Lab Results   Component Value Date    HDL 46 12/31/2018    HDL 44 10/02/2018    HDL 47 04/02/2018     Lab Results   Component Value Date    LDLCALC 107 (H) 12/31/2018    LDLCALC 108 (H) 10/02/2018    LDLCALC 107 (H) 04/02/2018     Lab Results   Component Value Date    LABVLDL 32 12/31/2018    LABVLDL 33 10/02/2018    LABVLDL 37 04/02/2018     No results found for: CHOLHDLRATIO    11/2018 TTE: EF 60%. MAC. Mild or moderate AR. Moderate AS. Ao root 4.2 cm.    5/2018 Harini: No ischemia    ECG: NSR, RBBB, LAFB. Nonspecific T abnl    4/2018 TTE: EF 55-60%. Severe LVH. Moderate AS. Mod-severe AR. MAC.    2012 SPECT: No ischemia    Assessment:  66 y.o. here for aortic valve disease. BP likely up from the decongestant he's taking. 1. Nonrheumatic aortic valve insufficiency    2. Nonrheumatic aortic valve stenosis    3. Morbid obesity with BMI of 50.0-59.9, adult ([address]    4. Essential hypertension    5. Mixed hyperlipidemia    6. Thoracic aortic aneurysm without rupture [facility])      Plan:  -Repeat echo in November, follow-up after  -Start Toprol 25 mg daily    Stanley Menchaca MD, [facility]

## 2019-04-22 ENCOUNTER — TELEPHONE (OUTPATIENT)
Dept: CARDIOLOGY CLINIC | Age: 64
End: 2019-04-22

## 2019-04-22 DIAGNOSIS — I35.0 NONRHEUMATIC AORTIC VALVE STENOSIS: Primary | ICD-10-CM

## 2019-04-22 DIAGNOSIS — I35.1 NONRHEUMATIC AORTIC VALVE INSUFFICIENCY: ICD-10-CM

## 2019-04-30 ENCOUNTER — OFFICE VISIT (OUTPATIENT)
Dept: INTERNAL MEDICINE CLINIC | Age: 64
End: 2019-04-30
Payer: COMMERCIAL

## 2019-04-30 VITALS
DIASTOLIC BLOOD PRESSURE: 70 MMHG | HEART RATE: 68 BPM | SYSTOLIC BLOOD PRESSURE: 138 MMHG | BODY MASS INDEX: 40.43 KG/M2 | HEIGHT: 74 IN | RESPIRATION RATE: 16 BRPM | WEIGHT: 315 LBS

## 2019-04-30 DIAGNOSIS — G47.30 SLEEP APNEA, UNSPECIFIED TYPE: ICD-10-CM

## 2019-04-30 DIAGNOSIS — I10 ESSENTIAL HYPERTENSION: ICD-10-CM

## 2019-04-30 DIAGNOSIS — R00.2 PALPITATIONS: ICD-10-CM

## 2019-04-30 DIAGNOSIS — K21.00 REFLUX ESOPHAGITIS: ICD-10-CM

## 2019-04-30 DIAGNOSIS — M17.11 ARTHRITIS OF RIGHT KNEE: ICD-10-CM

## 2019-04-30 DIAGNOSIS — Z23 NEED FOR PROPHYLACTIC VACCINATION AGAINST HEPATITIS A AND HEPATITIS B: ICD-10-CM

## 2019-04-30 DIAGNOSIS — R73.9 HYPERGLYCEMIA: ICD-10-CM

## 2019-04-30 DIAGNOSIS — I35.0 NONRHEUMATIC AORTIC VALVE STENOSIS: ICD-10-CM

## 2019-04-30 DIAGNOSIS — E78.2 MIXED HYPERLIPIDEMIA: ICD-10-CM

## 2019-04-30 DIAGNOSIS — E66.01 MORBID OBESITY WITH BMI OF 50.0-59.9, ADULT (HCC): ICD-10-CM

## 2019-04-30 DIAGNOSIS — I35.1 NONRHEUMATIC AORTIC VALVE INSUFFICIENCY: ICD-10-CM

## 2019-04-30 DIAGNOSIS — R35.1 BENIGN PROSTATIC HYPERPLASIA WITH NOCTURIA: ICD-10-CM

## 2019-04-30 DIAGNOSIS — Z00.00 WELL ADULT EXAM: Primary | ICD-10-CM

## 2019-04-30 DIAGNOSIS — N40.1 BENIGN PROSTATIC HYPERPLASIA WITH NOCTURIA: ICD-10-CM

## 2019-04-30 PROCEDURE — 99396 PREV VISIT EST AGE 40-64: CPT | Performed by: INTERNAL MEDICINE

## 2019-04-30 PROCEDURE — 90636 HEP A/HEP B VACC ADULT IM: CPT | Performed by: INTERNAL MEDICINE

## 2019-04-30 PROCEDURE — 90471 IMMUNIZATION ADMIN: CPT | Performed by: INTERNAL MEDICINE

## 2019-04-30 ASSESSMENT — PATIENT HEALTH QUESTIONNAIRE - PHQ9
1. LITTLE INTEREST OR PLEASURE IN DOING THINGS: 0
SUM OF ALL RESPONSES TO PHQ QUESTIONS 1-9: 0
SUM OF ALL RESPONSES TO PHQ9 QUESTIONS 1 & 2: 0
2. FEELING DOWN, DEPRESSED OR HOPELESS: 0
SUM OF ALL RESPONSES TO PHQ QUESTIONS 1-9: 0

## 2019-04-30 ASSESSMENT — ENCOUNTER SYMPTOMS
ALLERGIC/IMMUNOLOGIC NEGATIVE: 1
APNEA: 1
EYES NEGATIVE: 1
ROS SKIN COMMENTS: NEEDS TO SEE DERM

## 2019-04-30 NOTE — PROGRESS NOTES
(improved). Negative for leg swelling. Gastrointestinal:        Cindy Stiles sx  EGD 12/13/16 ? [place] to have repeat EGD 2/17  Nexium now 40 BID    Endocrine: Negative. Genitourinary: Positive for frequency and urgency. Nocturia   Musculoskeletal: Positive for gait problem (s/p bilat TKR ambulating improved ). Skin:        Needs to see derm     Allergic/Immunologic: Negative. Hematological: Negative. Psychiatric/Behavioral: Negative. Objective:   Physical Exam:  Physical Exam   Constitutional: He is oriented to person, place, and time. He appears well-developed and well-nourished. HENT:   Head: Normocephalic and atraumatic. Right Ear: External ear normal.   Left Ear: External ear normal.   Nose: Nose normal.   Mouth/Throat: Oropharynx is clear and moist.   Eyes: Pupils are equal, round, and reactive to light. Conjunctivae and EOM are normal.   Neck: Normal range of motion. Neck supple. No tracheal deviation present. No thyromegaly present. Cardiovascular: Normal rate, regular rhythm, normal heart sounds and intact distal pulses. No murmur heard. Pulmonary/Chest: Effort normal and breath sounds normal.   Abdominal: Soft. Bowel sounds are normal.   obese   Genitourinary: Rectum normal and penis normal. Rectal exam shows guaiac negative stool. Genitourinary Comments: Slight increase in prostate size no tenderness   Musculoskeletal: Normal range of motion. He exhibits edema (1+). Tenderness: knees  improved. Neurological: He is alert and oriented to person, place, and time. He has normal reflexes. Skin: Skin is warm and dry. Psychiatric: He has a normal mood and affect.  His behavior is normal.       /70 (Site: Left Upper Arm, Position: Sitting, Cuff Size: Medium Adult)   Pulse 68   Resp 16   Ht 6' 2\" (1.88 m)   Wt (!) 370 lb (167.8 kg)   BMI 47.51 kg/m²       Assessment & Plan:         Nonrheumatic aortic valve stenosis  Cont F/U with Dr Armen KNOTT in future as noted Toprol started as noted     Nonrheumatic aortic valve insufficiency  Cont F/U with Dr Jordan Rao ECHO in future as noted  Toprol started as noted     Essential hypertension  Stable continue current meds and return in 3 mo. Toprol added as noted     Sleep apnea  Stable now using C-pap     Palpitations  occ symptoms should improve with Toprol     Reflux esophagitis  Cont diet and meds     Mixed hyperlipidemia  Stable no change in meds return in 3 mo.  Labs pending Lipitor has bee increase to 80 mg as noted     BPH (benign prostatic hyperplasia)  occ symptoms     Morbid obesity with BMI of 50.0-59.9, adult (HCC)  Needs to diet and exercise to lose weight    Arthritis of right knee  Improved as noted     Hyperglycemia  Diet controled check labs     Well adult exam  Within normal limits for age- cont to work no ADL issues,immunizations up to date, no depression ,no cognitive impairment  Colonoscopy up to date  Eye exam up to date  Exercises as tolerated    No living will but does not want resuscitation   Findings and recommendations discussed with Pt

## 2019-05-23 ENCOUNTER — TELEPHONE (OUTPATIENT)
Dept: INTERNAL MEDICINE CLINIC | Age: 64
End: 2019-05-23

## 2019-05-28 ENCOUNTER — NURSE ONLY (OUTPATIENT)
Dept: INTERNAL MEDICINE CLINIC | Age: 64
End: 2019-05-28
Payer: COMMERCIAL

## 2019-05-28 DIAGNOSIS — Z23 NEED FOR VACCINATION WITH TWINRIX: Primary | ICD-10-CM

## 2019-05-28 PROCEDURE — 90471 IMMUNIZATION ADMIN: CPT | Performed by: INTERNAL MEDICINE

## 2019-05-28 PROCEDURE — 90636 HEP A/HEP B VACC ADULT IM: CPT | Performed by: INTERNAL MEDICINE

## 2019-05-30 PROBLEM — Z00.00 WELL ADULT EXAM: Status: RESOLVED | Noted: 2017-01-17 | Resolved: 2019-05-30

## 2019-06-05 DIAGNOSIS — I35.1 NONRHEUMATIC AORTIC VALVE INSUFFICIENCY: ICD-10-CM

## 2019-06-05 DIAGNOSIS — R35.1 BENIGN PROSTATIC HYPERPLASIA WITH NOCTURIA: ICD-10-CM

## 2019-06-05 DIAGNOSIS — R73.9 HYPERGLYCEMIA: ICD-10-CM

## 2019-06-05 DIAGNOSIS — M17.11 ARTHRITIS OF RIGHT KNEE: ICD-10-CM

## 2019-06-05 DIAGNOSIS — K21.00 REFLUX ESOPHAGITIS: ICD-10-CM

## 2019-06-05 DIAGNOSIS — I10 ESSENTIAL HYPERTENSION: ICD-10-CM

## 2019-06-05 DIAGNOSIS — Z00.00 WELL ADULT EXAM: ICD-10-CM

## 2019-06-05 DIAGNOSIS — E78.2 MIXED HYPERLIPIDEMIA: ICD-10-CM

## 2019-06-05 DIAGNOSIS — R00.2 PALPITATIONS: ICD-10-CM

## 2019-06-05 DIAGNOSIS — N40.1 BENIGN PROSTATIC HYPERPLASIA WITH NOCTURIA: ICD-10-CM

## 2019-06-05 DIAGNOSIS — I35.0 NONRHEUMATIC AORTIC VALVE STENOSIS: ICD-10-CM

## 2019-06-05 DIAGNOSIS — E66.01 MORBID OBESITY WITH BMI OF 50.0-59.9, ADULT (HCC): ICD-10-CM

## 2019-06-05 DIAGNOSIS — G47.30 SLEEP APNEA, UNSPECIFIED TYPE: ICD-10-CM

## 2019-06-05 LAB
A/G RATIO: 1.8 (ref 1.1–2.2)
ALBUMIN SERPL-MCNC: 4.6 G/DL (ref 3.4–5)
ALP BLD-CCNC: 53 U/L (ref 40–129)
ALT SERPL-CCNC: 30 U/L (ref 10–40)
ANION GAP SERPL CALCULATED.3IONS-SCNC: 19 MMOL/L (ref 3–16)
AST SERPL-CCNC: 28 U/L (ref 15–37)
BASOPHILS ABSOLUTE: 0.1 K/UL (ref 0–0.2)
BASOPHILS RELATIVE PERCENT: 0.9 %
BILIRUB SERPL-MCNC: 0.8 MG/DL (ref 0–1)
BUN BLDV-MCNC: 22 MG/DL (ref 7–20)
CALCIUM SERPL-MCNC: 9.8 MG/DL (ref 8.3–10.6)
CHLORIDE BLD-SCNC: 109 MMOL/L (ref 99–110)
CHOLESTEROL, TOTAL: 196 MG/DL (ref 0–199)
CO2: 19 MMOL/L (ref 21–32)
CREAT SERPL-MCNC: 1 MG/DL (ref 0.8–1.3)
EOSINOPHILS ABSOLUTE: 0.1 K/UL (ref 0–0.6)
EOSINOPHILS RELATIVE PERCENT: 2.1 %
GFR AFRICAN AMERICAN: >60
GFR NON-AFRICAN AMERICAN: >60
GLOBULIN: 2.5 G/DL
GLUCOSE BLD-MCNC: 136 MG/DL (ref 70–99)
HCT VFR BLD CALC: 50.9 % (ref 40.5–52.5)
HDLC SERPL-MCNC: 50 MG/DL (ref 40–60)
HEMOGLOBIN: 17.7 G/DL (ref 13.5–17.5)
LDL CHOLESTEROL CALCULATED: 119 MG/DL
LYMPHOCYTES ABSOLUTE: 1.8 K/UL (ref 1–5.1)
LYMPHOCYTES RELATIVE PERCENT: 28.8 %
MCH RBC QN AUTO: 33.5 PG (ref 26–34)
MCHC RBC AUTO-ENTMCNC: 34.7 G/DL (ref 31–36)
MCV RBC AUTO: 96.5 FL (ref 80–100)
MONOCYTES ABSOLUTE: 0.6 K/UL (ref 0–1.3)
MONOCYTES RELATIVE PERCENT: 9.9 %
NEUTROPHILS ABSOLUTE: 3.5 K/UL (ref 1.7–7.7)
NEUTROPHILS RELATIVE PERCENT: 58.3 %
PDW BLD-RTO: 13.9 % (ref 12.4–15.4)
PLATELET # BLD: 200 K/UL (ref 135–450)
PMV BLD AUTO: 9.9 FL (ref 5–10.5)
POTASSIUM SERPL-SCNC: 4.8 MMOL/L (ref 3.5–5.1)
PROSTATE SPECIFIC ANTIGEN: 2.98 NG/ML (ref 0–4)
RBC # BLD: 5.28 M/UL (ref 4.2–5.9)
SODIUM BLD-SCNC: 147 MMOL/L (ref 136–145)
TOTAL PROTEIN: 7.1 G/DL (ref 6.4–8.2)
TRIGL SERPL-MCNC: 134 MG/DL (ref 0–150)
TSH REFLEX: 1.37 UIU/ML (ref 0.27–4.2)
VLDLC SERPL CALC-MCNC: 27 MG/DL
WBC # BLD: 6.1 K/UL (ref 4–11)

## 2019-06-06 DIAGNOSIS — R73.9 HYPERGLYCEMIA: Primary | ICD-10-CM

## 2019-06-06 DIAGNOSIS — R73.9 HYPERGLYCEMIA: ICD-10-CM

## 2019-06-06 LAB
ESTIMATED AVERAGE GLUCOSE: 108.3 MG/DL
HBA1C MFR BLD: 5.4 %

## 2019-06-25 RX ORDER — LISINOPRIL 10 MG/1
TABLET ORAL
Qty: 120 TABLET | Refills: 0 | Status: SHIPPED | OUTPATIENT
Start: 2019-06-25 | End: 2019-11-04 | Stop reason: SDUPTHER

## 2019-06-25 RX ORDER — FENOFIBRATE 145 MG/1
TABLET, COATED ORAL
Qty: 120 TABLET | Refills: 0 | Status: SHIPPED | OUTPATIENT
Start: 2019-06-25 | End: 2019-11-04 | Stop reason: SDUPTHER

## 2019-06-25 RX ORDER — ATORVASTATIN CALCIUM 40 MG/1
TABLET, FILM COATED ORAL
Qty: 120 TABLET | Refills: 0 | Status: SHIPPED | OUTPATIENT
Start: 2019-06-25 | End: 2019-12-11 | Stop reason: DRUGHIGH

## 2019-07-30 ENCOUNTER — OFFICE VISIT (OUTPATIENT)
Dept: INTERNAL MEDICINE CLINIC | Age: 64
End: 2019-07-30
Payer: COMMERCIAL

## 2019-07-30 VITALS
RESPIRATION RATE: 14 BRPM | WEIGHT: 315 LBS | SYSTOLIC BLOOD PRESSURE: 124 MMHG | DIASTOLIC BLOOD PRESSURE: 78 MMHG | BODY MASS INDEX: 40.43 KG/M2 | HEIGHT: 74 IN

## 2019-07-30 DIAGNOSIS — R00.2 PALPITATIONS: ICD-10-CM

## 2019-07-30 DIAGNOSIS — R73.9 HYPERGLYCEMIA: ICD-10-CM

## 2019-07-30 DIAGNOSIS — R35.1 BENIGN PROSTATIC HYPERPLASIA WITH NOCTURIA: ICD-10-CM

## 2019-07-30 DIAGNOSIS — I10 ESSENTIAL HYPERTENSION: ICD-10-CM

## 2019-07-30 DIAGNOSIS — G47.30 SLEEP APNEA, UNSPECIFIED TYPE: ICD-10-CM

## 2019-07-30 DIAGNOSIS — E66.01 MORBID OBESITY WITH BMI OF 50.0-59.9, ADULT (HCC): ICD-10-CM

## 2019-07-30 DIAGNOSIS — M17.11 ARTHRITIS OF RIGHT KNEE: ICD-10-CM

## 2019-07-30 DIAGNOSIS — K21.00 REFLUX ESOPHAGITIS: ICD-10-CM

## 2019-07-30 DIAGNOSIS — I35.1 NONRHEUMATIC AORTIC VALVE INSUFFICIENCY: ICD-10-CM

## 2019-07-30 DIAGNOSIS — N40.1 BENIGN PROSTATIC HYPERPLASIA WITH NOCTURIA: ICD-10-CM

## 2019-07-30 DIAGNOSIS — E78.2 MIXED HYPERLIPIDEMIA: ICD-10-CM

## 2019-07-30 DIAGNOSIS — I35.0 NONRHEUMATIC AORTIC VALVE STENOSIS: ICD-10-CM

## 2019-07-30 PROCEDURE — 1036F TOBACCO NON-USER: CPT | Performed by: INTERNAL MEDICINE

## 2019-07-30 PROCEDURE — G8417 CALC BMI ABV UP PARAM F/U: HCPCS | Performed by: INTERNAL MEDICINE

## 2019-07-30 PROCEDURE — G8427 DOCREV CUR MEDS BY ELIG CLIN: HCPCS | Performed by: INTERNAL MEDICINE

## 2019-07-30 PROCEDURE — 3017F COLORECTAL CA SCREEN DOC REV: CPT | Performed by: INTERNAL MEDICINE

## 2019-07-30 PROCEDURE — 99214 OFFICE O/P EST MOD 30 MIN: CPT | Performed by: INTERNAL MEDICINE

## 2019-07-30 ASSESSMENT — ENCOUNTER SYMPTOMS
ALLERGIC/IMMUNOLOGIC NEGATIVE: 1
SHORTNESS OF BREATH: 0
CHEST TIGHTNESS: 0
EYES NEGATIVE: 1
ROS SKIN COMMENTS: NEEDS TO SEE DERM
WHEEZING: 0
APNEA: 1

## 2019-07-30 NOTE — PROGRESS NOTES
Inability: Not on file    Transportation needs:     Medical: Not on file     Non-medical: Not on file   Tobacco Use    Smoking status: Never Smoker    Smokeless tobacco: Never Used   Substance and Sexual Activity    Alcohol use: Yes     Comment: occ    Drug use: No    Sexual activity: Not on file   Lifestyle    Physical activity:     Days per week: Not on file     Minutes per session: Not on file    Stress: Not on file   Relationships    Social connections:     Talks on phone: Not on file     Gets together: Not on file     Attends Anabaptist service: Not on file     Active member of club or organization: Not on file     Attends meetings of clubs or organizations: Not on file     Relationship status: Not on file    Intimate partner violence:     Fear of current or ex partner: Not on file     Emotionally abused: Not on file     Physically abused: Not on file     Forced sexual activity: Not on file   Other Topics Concern    Not on file   Social History Narrative    ** Merged History Encounter **            Review of Systems  Review of Systems   Constitutional: Negative. Negative for chills, diaphoresis and unexpected weight change (up a little ). HENT: Negative. Eyes: Negative. Respiratory: Positive for apnea (improved using C-pap ). Negative for chest tightness, shortness of breath and wheezing. Cardiovascular: Positive for palpitations. Negative for leg swelling. Gastrointestinal:        Destini Cobble sx  EGD 12/13/16 ? [facility] to have repeat EGD 2/17  improved  Nexium now 40 QD    Endocrine: Negative. Genitourinary: Positive for frequency and urgency. Nocturia   Musculoskeletal: Positive for gait problem (s/p bilat TKR ambulating improved ). Skin:        Needs to see derm     Allergic/Immunologic: Negative. Hematological: Negative. Psychiatric/Behavioral: Negative. Objective:   Physical Exam:  Physical Exam   Constitutional: He is oriented to person, place, and time.  He

## 2019-10-31 ENCOUNTER — TELEPHONE (OUTPATIENT)
Dept: CARDIOLOGY CLINIC | Age: 64
End: 2019-10-31

## 2019-11-01 ENCOUNTER — OFFICE VISIT (OUTPATIENT)
Dept: INTERNAL MEDICINE CLINIC | Age: 64
End: 2019-11-01
Payer: COMMERCIAL

## 2019-11-01 VITALS
SYSTOLIC BLOOD PRESSURE: 128 MMHG | HEIGHT: 74 IN | WEIGHT: 315 LBS | HEART RATE: 68 BPM | BODY MASS INDEX: 40.43 KG/M2 | DIASTOLIC BLOOD PRESSURE: 78 MMHG | RESPIRATION RATE: 16 BRPM

## 2019-11-01 DIAGNOSIS — I35.1 NONRHEUMATIC AORTIC VALVE INSUFFICIENCY: ICD-10-CM

## 2019-11-01 DIAGNOSIS — I10 ESSENTIAL HYPERTENSION: Primary | ICD-10-CM

## 2019-11-01 DIAGNOSIS — E66.01 MORBID OBESITY WITH BMI OF 50.0-59.9, ADULT (HCC): ICD-10-CM

## 2019-11-01 DIAGNOSIS — Z23 NEED FOR INFLUENZA VACCINATION: ICD-10-CM

## 2019-11-01 DIAGNOSIS — R73.9 HYPERGLYCEMIA: ICD-10-CM

## 2019-11-01 DIAGNOSIS — K21.00 REFLUX ESOPHAGITIS: ICD-10-CM

## 2019-11-01 DIAGNOSIS — G47.30 SLEEP APNEA, UNSPECIFIED TYPE: ICD-10-CM

## 2019-11-01 DIAGNOSIS — I35.0 NONRHEUMATIC AORTIC VALVE STENOSIS: ICD-10-CM

## 2019-11-01 DIAGNOSIS — E78.2 MIXED HYPERLIPIDEMIA: ICD-10-CM

## 2019-11-01 DIAGNOSIS — Z23 NEED FOR HEPATITIS A AND B VACCINATION: ICD-10-CM

## 2019-11-01 PROCEDURE — G8417 CALC BMI ABV UP PARAM F/U: HCPCS | Performed by: INTERNAL MEDICINE

## 2019-11-01 PROCEDURE — 1036F TOBACCO NON-USER: CPT | Performed by: INTERNAL MEDICINE

## 2019-11-01 PROCEDURE — G8482 FLU IMMUNIZE ORDER/ADMIN: HCPCS | Performed by: INTERNAL MEDICINE

## 2019-11-01 PROCEDURE — 3017F COLORECTAL CA SCREEN DOC REV: CPT | Performed by: INTERNAL MEDICINE

## 2019-11-01 PROCEDURE — 90471 IMMUNIZATION ADMIN: CPT | Performed by: INTERNAL MEDICINE

## 2019-11-01 PROCEDURE — G8427 DOCREV CUR MEDS BY ELIG CLIN: HCPCS | Performed by: INTERNAL MEDICINE

## 2019-11-01 PROCEDURE — 99214 OFFICE O/P EST MOD 30 MIN: CPT | Performed by: INTERNAL MEDICINE

## 2019-11-01 PROCEDURE — 90686 IIV4 VACC NO PRSV 0.5 ML IM: CPT | Performed by: INTERNAL MEDICINE

## 2019-11-01 ASSESSMENT — ENCOUNTER SYMPTOMS
WHEEZING: 0
CHEST TIGHTNESS: 0
EYES NEGATIVE: 1
ROS SKIN COMMENTS: NEEDS TO SEE DERM
ALLERGIC/IMMUNOLOGIC NEGATIVE: 1
SHORTNESS OF BREATH: 0
APNEA: 1

## 2019-11-01 ASSESSMENT — PATIENT HEALTH QUESTIONNAIRE - PHQ9
1. LITTLE INTEREST OR PLEASURE IN DOING THINGS: 0
2. FEELING DOWN, DEPRESSED OR HOPELESS: 0
SUM OF ALL RESPONSES TO PHQ QUESTIONS 1-9: 0
SUM OF ALL RESPONSES TO PHQ QUESTIONS 1-9: 0
SUM OF ALL RESPONSES TO PHQ9 QUESTIONS 1 & 2: 0

## 2019-11-06 RX ORDER — LISINOPRIL 10 MG/1
TABLET ORAL
Qty: 90 TABLET | Refills: 1 | Status: SHIPPED | OUTPATIENT
Start: 2019-11-06 | End: 2019-11-27

## 2019-11-06 RX ORDER — FENOFIBRATE 145 MG/1
TABLET, COATED ORAL
Qty: 90 TABLET | Refills: 1 | Status: SHIPPED | OUTPATIENT
Start: 2019-11-06 | End: 2020-03-11

## 2019-11-13 ENCOUNTER — HOSPITAL ENCOUNTER (OUTPATIENT)
Dept: NON INVASIVE DIAGNOSTICS | Age: 64
Discharge: HOME | End: 2019-11-13
Payer: COMMERCIAL

## 2019-11-13 DIAGNOSIS — I35.0 NONRHEUMATIC AORTIC VALVE STENOSIS: ICD-10-CM

## 2019-11-13 DIAGNOSIS — I35.1 NONRHEUMATIC AORTIC VALVE INSUFFICIENCY: ICD-10-CM

## 2019-11-13 LAB
LV EF: 60 %
LVEF MODALITY: NORMAL

## 2019-11-13 PROCEDURE — 6360000004 HC RX CONTRAST MEDICATION: Performed by: INTERNAL MEDICINE

## 2019-11-13 PROCEDURE — C8929 TTE W OR WO FOL WCON,DOPPLER: HCPCS

## 2019-11-13 RX ADMIN — PERFLUTREN 2.2 MG: 6.52 INJECTION, SUSPENSION INTRAVENOUS at 13:07

## 2019-11-27 ENCOUNTER — OFFICE VISIT (OUTPATIENT)
Dept: CARDIOLOGY CLINIC | Age: 64
End: 2019-11-27
Payer: COMMERCIAL

## 2019-11-27 VITALS
HEART RATE: 84 BPM | DIASTOLIC BLOOD PRESSURE: 90 MMHG | BODY MASS INDEX: 48.61 KG/M2 | WEIGHT: 315 LBS | SYSTOLIC BLOOD PRESSURE: 180 MMHG

## 2019-11-27 DIAGNOSIS — I71.20 THORACIC AORTIC ANEURYSM WITHOUT RUPTURE: ICD-10-CM

## 2019-11-27 DIAGNOSIS — E66.01 MORBID OBESITY WITH BMI OF 50.0-59.9, ADULT (HCC): ICD-10-CM

## 2019-11-27 DIAGNOSIS — I35.0 NONRHEUMATIC AORTIC VALVE STENOSIS: Primary | ICD-10-CM

## 2019-11-27 DIAGNOSIS — I10 BENIGN ESSENTIAL HTN: ICD-10-CM

## 2019-11-27 PROCEDURE — 1036F TOBACCO NON-USER: CPT | Performed by: INTERNAL MEDICINE

## 2019-11-27 PROCEDURE — 93000 ELECTROCARDIOGRAM COMPLETE: CPT | Performed by: INTERNAL MEDICINE

## 2019-11-27 PROCEDURE — 3017F COLORECTAL CA SCREEN DOC REV: CPT | Performed by: INTERNAL MEDICINE

## 2019-11-27 PROCEDURE — G8417 CALC BMI ABV UP PARAM F/U: HCPCS | Performed by: INTERNAL MEDICINE

## 2019-11-27 PROCEDURE — G8427 DOCREV CUR MEDS BY ELIG CLIN: HCPCS | Performed by: INTERNAL MEDICINE

## 2019-11-27 PROCEDURE — G8482 FLU IMMUNIZE ORDER/ADMIN: HCPCS | Performed by: INTERNAL MEDICINE

## 2019-11-27 PROCEDURE — 99214 OFFICE O/P EST MOD 30 MIN: CPT | Performed by: INTERNAL MEDICINE

## 2019-11-27 RX ORDER — LISINOPRIL 20 MG/1
20 TABLET ORAL DAILY
Qty: 90 TABLET | Refills: 3 | Status: SHIPPED | OUTPATIENT
Start: 2019-11-27 | End: 2019-12-11 | Stop reason: DRUGHIGH

## 2019-12-06 DIAGNOSIS — Z23 NEED FOR HEPATITIS A AND B VACCINATION: ICD-10-CM

## 2019-12-06 DIAGNOSIS — I10 ESSENTIAL HYPERTENSION: ICD-10-CM

## 2019-12-06 DIAGNOSIS — I35.0 NONRHEUMATIC AORTIC VALVE STENOSIS: ICD-10-CM

## 2019-12-06 DIAGNOSIS — K21.00 REFLUX ESOPHAGITIS: ICD-10-CM

## 2019-12-06 DIAGNOSIS — I10 BENIGN ESSENTIAL HTN: ICD-10-CM

## 2019-12-06 DIAGNOSIS — E78.2 MIXED HYPERLIPIDEMIA: ICD-10-CM

## 2019-12-06 DIAGNOSIS — R73.9 HYPERGLYCEMIA: ICD-10-CM

## 2019-12-06 DIAGNOSIS — E66.01 MORBID OBESITY WITH BMI OF 50.0-59.9, ADULT (HCC): ICD-10-CM

## 2019-12-06 DIAGNOSIS — G47.30 SLEEP APNEA, UNSPECIFIED TYPE: ICD-10-CM

## 2019-12-06 DIAGNOSIS — I35.1 NONRHEUMATIC AORTIC VALVE INSUFFICIENCY: ICD-10-CM

## 2019-12-06 LAB
A/G RATIO: 1.8 (ref 1.1–2.2)
ALBUMIN SERPL-MCNC: 4.2 G/DL (ref 3.4–5)
ALP BLD-CCNC: 45 U/L (ref 40–129)
ALT SERPL-CCNC: 21 U/L (ref 10–40)
ANION GAP SERPL CALCULATED.3IONS-SCNC: 17 MMOL/L (ref 3–16)
AST SERPL-CCNC: 22 U/L (ref 15–37)
BASOPHILS ABSOLUTE: 0 K/UL (ref 0–0.2)
BASOPHILS RELATIVE PERCENT: 0.6 %
BILIRUB SERPL-MCNC: 0.7 MG/DL (ref 0–1)
BUN BLDV-MCNC: 18 MG/DL (ref 7–20)
CALCIUM SERPL-MCNC: 9.3 MG/DL (ref 8.3–10.6)
CHLORIDE BLD-SCNC: 100 MMOL/L (ref 99–110)
CHOLESTEROL, TOTAL: 189 MG/DL (ref 0–199)
CO2: 22 MMOL/L (ref 21–32)
CREAT SERPL-MCNC: 0.9 MG/DL (ref 0.8–1.3)
EOSINOPHILS ABSOLUTE: 0.1 K/UL (ref 0–0.6)
EOSINOPHILS RELATIVE PERCENT: 1.4 %
GFR AFRICAN AMERICAN: >60
GFR NON-AFRICAN AMERICAN: >60
GLOBULIN: 2.3 G/DL
GLUCOSE BLD-MCNC: 120 MG/DL (ref 70–99)
HCT VFR BLD CALC: 49.1 % (ref 40.5–52.5)
HDLC SERPL-MCNC: 45 MG/DL (ref 40–60)
HEMOGLOBIN: 16.7 G/DL (ref 13.5–17.5)
LDL CHOLESTEROL CALCULATED: 108 MG/DL
LYMPHOCYTES ABSOLUTE: 1.9 K/UL (ref 1–5.1)
LYMPHOCYTES RELATIVE PERCENT: 30.6 %
MCH RBC QN AUTO: 32.9 PG (ref 26–34)
MCHC RBC AUTO-ENTMCNC: 34 G/DL (ref 31–36)
MCV RBC AUTO: 96.5 FL (ref 80–100)
MONOCYTES ABSOLUTE: 0.6 K/UL (ref 0–1.3)
MONOCYTES RELATIVE PERCENT: 10.6 %
NEUTROPHILS ABSOLUTE: 3.5 K/UL (ref 1.7–7.7)
NEUTROPHILS RELATIVE PERCENT: 56.8 %
PDW BLD-RTO: 13.7 % (ref 12.4–15.4)
PLATELET # BLD: 176 K/UL (ref 135–450)
PMV BLD AUTO: 10 FL (ref 5–10.5)
POTASSIUM SERPL-SCNC: 4.6 MMOL/L (ref 3.5–5.1)
RBC # BLD: 5.09 M/UL (ref 4.2–5.9)
SODIUM BLD-SCNC: 139 MMOL/L (ref 136–145)
TOTAL PROTEIN: 6.5 G/DL (ref 6.4–8.2)
TRIGL SERPL-MCNC: 179 MG/DL (ref 0–150)
TSH REFLEX: 1.31 UIU/ML (ref 0.27–4.2)
VLDLC SERPL CALC-MCNC: 36 MG/DL
WBC # BLD: 6.1 K/UL (ref 4–11)

## 2019-12-07 LAB
ESTIMATED AVERAGE GLUCOSE: 111.2 MG/DL
HBA1C MFR BLD: 5.5 %

## 2019-12-10 DIAGNOSIS — K21.00 REFLUX ESOPHAGITIS: ICD-10-CM

## 2019-12-10 RX ORDER — ESOMEPRAZOLE MAGNESIUM 40 MG/1
CAPSULE, DELAYED RELEASE ORAL
Qty: 90 CAPSULE | Refills: 1 | Status: SHIPPED | OUTPATIENT
Start: 2019-12-10 | End: 2019-12-12 | Stop reason: CLARIF

## 2019-12-11 ENCOUNTER — OFFICE VISIT (OUTPATIENT)
Dept: CARDIOLOGY CLINIC | Age: 64
End: 2019-12-11
Payer: COMMERCIAL

## 2019-12-11 ENCOUNTER — NURSE ONLY (OUTPATIENT)
Dept: INTERNAL MEDICINE CLINIC | Age: 64
End: 2019-12-11
Payer: COMMERCIAL

## 2019-12-11 VITALS
BODY MASS INDEX: 40.43 KG/M2 | DIASTOLIC BLOOD PRESSURE: 78 MMHG | HEIGHT: 74 IN | SYSTOLIC BLOOD PRESSURE: 142 MMHG | OXYGEN SATURATION: 93 % | WEIGHT: 315 LBS | HEART RATE: 86 BPM

## 2019-12-11 DIAGNOSIS — I10 ESSENTIAL HYPERTENSION: Primary | ICD-10-CM

## 2019-12-11 DIAGNOSIS — I35.0 NONRHEUMATIC AORTIC VALVE STENOSIS: ICD-10-CM

## 2019-12-11 DIAGNOSIS — Z23 NEED FOR HEPATITIS A AND B VACCINATION: Primary | ICD-10-CM

## 2019-12-11 DIAGNOSIS — I71.20 THORACIC AORTIC ANEURYSM WITHOUT RUPTURE: ICD-10-CM

## 2019-12-11 DIAGNOSIS — E78.2 MIXED HYPERLIPIDEMIA: ICD-10-CM

## 2019-12-11 PROCEDURE — 90471 IMMUNIZATION ADMIN: CPT | Performed by: INTERNAL MEDICINE

## 2019-12-11 PROCEDURE — 3017F COLORECTAL CA SCREEN DOC REV: CPT | Performed by: NURSE PRACTITIONER

## 2019-12-11 PROCEDURE — 99214 OFFICE O/P EST MOD 30 MIN: CPT | Performed by: NURSE PRACTITIONER

## 2019-12-11 PROCEDURE — 1036F TOBACCO NON-USER: CPT | Performed by: NURSE PRACTITIONER

## 2019-12-11 PROCEDURE — G8427 DOCREV CUR MEDS BY ELIG CLIN: HCPCS | Performed by: NURSE PRACTITIONER

## 2019-12-11 PROCEDURE — G8482 FLU IMMUNIZE ORDER/ADMIN: HCPCS | Performed by: NURSE PRACTITIONER

## 2019-12-11 PROCEDURE — G8417 CALC BMI ABV UP PARAM F/U: HCPCS | Performed by: NURSE PRACTITIONER

## 2019-12-11 PROCEDURE — 90636 HEP A/HEP B VACC ADULT IM: CPT | Performed by: INTERNAL MEDICINE

## 2019-12-11 RX ORDER — LISINOPRIL 40 MG/1
40 TABLET ORAL DAILY
Qty: 90 TABLET | Refills: 3 | Status: SHIPPED | OUTPATIENT
Start: 2019-12-11 | End: 2022-02-07 | Stop reason: DRUGHIGH

## 2019-12-12 ENCOUNTER — TELEPHONE (OUTPATIENT)
Dept: INTERNAL MEDICINE CLINIC | Age: 64
End: 2019-12-12

## 2019-12-12 RX ORDER — PANTOPRAZOLE SODIUM 40 MG/1
40 TABLET, DELAYED RELEASE ORAL
Qty: 90 TABLET | Refills: 1 | Status: SHIPPED | OUTPATIENT
Start: 2019-12-12 | End: 2020-05-08 | Stop reason: ALTCHOICE

## 2019-12-31 ENCOUNTER — TELEPHONE (OUTPATIENT)
Dept: INTERNAL MEDICINE CLINIC | Age: 64
End: 2019-12-31

## 2019-12-31 RX ORDER — BENZONATATE 200 MG/1
200 CAPSULE ORAL 3 TIMES DAILY PRN
Qty: 30 CAPSULE | Refills: 0 | Status: SHIPPED | OUTPATIENT
Start: 2019-12-31 | End: 2020-01-07

## 2019-12-31 RX ORDER — ONDANSETRON 4 MG/1
4 TABLET, FILM COATED ORAL DAILY PRN
Qty: 30 TABLET | Refills: 0 | Status: SHIPPED | OUTPATIENT
Start: 2019-12-31 | End: 2020-05-08 | Stop reason: ALTCHOICE

## 2020-01-03 ENCOUNTER — TELEPHONE (OUTPATIENT)
Dept: INTERNAL MEDICINE CLINIC | Age: 65
End: 2020-01-03

## 2020-01-03 RX ORDER — AZITHROMYCIN 250 MG/1
TABLET, FILM COATED ORAL
Qty: 1 PACKET | Refills: 0 | Status: SHIPPED | OUTPATIENT
Start: 2020-01-03 | End: 2020-05-08 | Stop reason: ALTCHOICE

## 2020-01-03 NOTE — TELEPHONE ENCOUNTER
Pt states he been sick for about a week now and would like to like to know if something could be called into pharmacy. Symptoms: Temp of 99.3, Sinus and chest congestion, coughing,     Pt states he is due to leave on Sunday to [facility] and wanted to know would that be a problem.

## 2020-01-28 DIAGNOSIS — I10 ESSENTIAL HYPERTENSION: ICD-10-CM

## 2020-01-28 LAB
ANION GAP SERPL CALCULATED.3IONS-SCNC: 13 MMOL/L (ref 3–16)
BUN BLDV-MCNC: 18 MG/DL (ref 7–20)
CALCIUM SERPL-MCNC: 9.3 MG/DL (ref 8.3–10.6)
CHLORIDE BLD-SCNC: 105 MMOL/L (ref 99–110)
CO2: 23 MMOL/L (ref 21–32)
CREAT SERPL-MCNC: 0.9 MG/DL (ref 0.8–1.3)
GFR AFRICAN AMERICAN: >60
GFR NON-AFRICAN AMERICAN: >60
GLUCOSE BLD-MCNC: 118 MG/DL (ref 70–99)
POTASSIUM SERPL-SCNC: 4.5 MMOL/L (ref 3.5–5.1)
SODIUM BLD-SCNC: 141 MMOL/L (ref 136–145)

## 2020-02-07 ENCOUNTER — OFFICE VISIT (OUTPATIENT)
Dept: INTERNAL MEDICINE CLINIC | Age: 65
End: 2020-02-07
Payer: COMMERCIAL

## 2020-02-07 VITALS
SYSTOLIC BLOOD PRESSURE: 152 MMHG | DIASTOLIC BLOOD PRESSURE: 80 MMHG | RESPIRATION RATE: 14 BRPM | HEART RATE: 68 BPM | WEIGHT: 315 LBS | HEIGHT: 74 IN | BODY MASS INDEX: 40.43 KG/M2

## 2020-02-07 PROCEDURE — G8417 CALC BMI ABV UP PARAM F/U: HCPCS | Performed by: INTERNAL MEDICINE

## 2020-02-07 PROCEDURE — G8427 DOCREV CUR MEDS BY ELIG CLIN: HCPCS | Performed by: INTERNAL MEDICINE

## 2020-02-07 PROCEDURE — 99214 OFFICE O/P EST MOD 30 MIN: CPT | Performed by: INTERNAL MEDICINE

## 2020-02-07 PROCEDURE — 3017F COLORECTAL CA SCREEN DOC REV: CPT | Performed by: INTERNAL MEDICINE

## 2020-02-07 PROCEDURE — 1036F TOBACCO NON-USER: CPT | Performed by: INTERNAL MEDICINE

## 2020-02-07 PROCEDURE — G8482 FLU IMMUNIZE ORDER/ADMIN: HCPCS | Performed by: INTERNAL MEDICINE

## 2020-02-07 RX ORDER — HYDROCHLOROTHIAZIDE 25 MG/1
25 TABLET ORAL EVERY MORNING
Qty: 90 TABLET | Refills: 1 | Status: SHIPPED | OUTPATIENT
Start: 2020-02-07 | End: 2020-07-13 | Stop reason: SDUPTHER

## 2020-02-07 SDOH — ECONOMIC STABILITY: INCOME INSECURITY: HOW HARD IS IT FOR YOU TO PAY FOR THE VERY BASICS LIKE FOOD, HOUSING, MEDICAL CARE, AND HEATING?: NOT HARD AT ALL

## 2020-02-07 SDOH — ECONOMIC STABILITY: TRANSPORTATION INSECURITY
IN THE PAST 12 MONTHS, HAS THE LACK OF TRANSPORTATION KEPT YOU FROM MEDICAL APPOINTMENTS OR FROM GETTING MEDICATIONS?: NO

## 2020-02-07 SDOH — ECONOMIC STABILITY: TRANSPORTATION INSECURITY
IN THE PAST 12 MONTHS, HAS LACK OF TRANSPORTATION KEPT YOU FROM MEETINGS, WORK, OR FROM GETTING THINGS NEEDED FOR DAILY LIVING?: NO

## 2020-02-07 SDOH — ECONOMIC STABILITY: FOOD INSECURITY: WITHIN THE PAST 12 MONTHS, YOU WORRIED THAT YOUR FOOD WOULD RUN OUT BEFORE YOU GOT MONEY TO BUY MORE.: NEVER TRUE

## 2020-02-07 SDOH — ECONOMIC STABILITY: FOOD INSECURITY: WITHIN THE PAST 12 MONTHS, THE FOOD YOU BOUGHT JUST DIDN'T LAST AND YOU DIDN'T HAVE MONEY TO GET MORE.: NEVER TRUE

## 2020-02-07 ASSESSMENT — PATIENT HEALTH QUESTIONNAIRE - PHQ9
SUM OF ALL RESPONSES TO PHQ QUESTIONS 1-9: 0
SUM OF ALL RESPONSES TO PHQ QUESTIONS 1-9: 0
1. LITTLE INTEREST OR PLEASURE IN DOING THINGS: 0
SUM OF ALL RESPONSES TO PHQ9 QUESTIONS 1 & 2: 0
2. FEELING DOWN, DEPRESSED OR HOPELESS: 0

## 2020-02-07 ASSESSMENT — ENCOUNTER SYMPTOMS
EYES NEGATIVE: 1
APNEA: 1
ROS SKIN COMMENTS: NEEDS TO SEE DERM
SHORTNESS OF BREATH: 0
CHEST TIGHTNESS: 0
WHEEZING: 0
ALLERGIC/IMMUNOLOGIC NEGATIVE: 1

## 2020-02-07 NOTE — PROGRESS NOTES
pantoprazole (PROTONIX) 40 MG tablet Take 1 tablet by mouth every morning (before breakfast) 90 tablet 1    lisinopril (PRINIVIL;ZESTRIL) 40 MG tablet Take 1 tablet by mouth daily 90 tablet 3    fenofibrate (TRICOR) 145 MG tablet TAKE 1 TABLET BY MOUTH  DAILY 90 tablet 1    triamcinolone (KENALOG) 0.1 % ointment APPLY TO LEGS BID FOR 2 WEEKS AT A TIME AS NEEDED FOR SCALING  3    atorvastatin (LIPITOR) 80 MG tablet Take 1 tablet by mouth daily 90 tablet 3    metoprolol succinate (TOPROL XL) 25 MG extended release tablet Take 1 tablet by mouth daily 90 tablet 3    aspirin 81 MG tablet Take 81 mg by mouth daily      clindamycin (CLEOCIN) 300 MG capsule Take 300 mg by mouth 3 times daily Before dental procedure      Multiple Vitamins-Minerals (MULTIVITAMIN ADULTS 50+ PO) Take by mouth daily      cetirizine (ZYRTEC) 10 MG tablet Take 10 mg by mouth as needed for Allergies      CIALIS 20 MG tablet TAKE AS DIRECTED 6 tablet 0    Cholecalciferol (VITAMIN D) 1000 UNIT CAPS Take 2,000 Units by mouth. No current facility-administered medications for this visit.         Past Medical History:   Diagnosis Date    Apnea     Hyperlipidemia     Kidney stone on right side 1/2016    passed ok     Syncope 10/2004    Unspecified sleep apnea     using C-PAP       Family History   Problem Relation Age of Onset    Cancer Mother         ovarian    Other Father         Pulmonary Fibrosis       Past Surgical History:   Procedure Laterality Date    COLONOSCOPY  01/26/2004    POLYP    COLONOSCOPY  12/13/2016    polyps Dr. Samira Loja repeat in 2020    ENDOSCOPY, COLON, [address]      left knee    JOINT REPLACEMENT Left 11/19/13    L TKR Dr Roseanne Buckley Right 11/2014    R TKR Dr Jeff Cole  03/23/2018    Dr. Lex Joiner repeat in 2021       Social History     Socioeconomic History    Marital status:      Spouse name: Not on file    Number of children: Not on file    Years of education: Not on file    Highest education level: Not on file   Occupational History    Not on file   Social Needs    Financial resource strain: Not hard at all    Food insecurity:     Worry: Never true     Inability: Never true   [employer] needs:     Medical: No     Non-medical: No   Tobacco Use    Smoking status: Never Smoker    Smokeless tobacco: Never Used   Substance and Sexual Activity    Alcohol use: Yes     Comment: occ    Drug use: No    Sexual activity: Not on file   Lifestyle    Physical activity:     Days per week: Not on file     Minutes per session: Not on file    Stress: Not on file   Relationships    Social connections:     Talks on phone: Not on file     Gets together: Not on file     Attends Taoism service: Not on file     Active member of club or organization: Not on file     Attends meetings of clubs or organizations: Not on file     Relationship status: Not on file    Intimate partner violence:     Fear of current or ex partner: Not on file     Emotionally abused: Not on file     Physically abused: Not on file     Forced sexual activity: Not on file   Other Topics Concern    Not on file   Social History Narrative    ** Merged History Encounter **            Review of Systems  Review of Systems   Constitutional: Negative. Negative for chills, diaphoresis and unexpected weight change (up a little ). HENT: Negative. Eyes: Negative. Respiratory: Positive for apnea (improved using C-pap ). Negative for chest tightness, shortness of breath and wheezing. Cardiovascular: Positive for palpitations and leg swelling. Gastrointestinal:        Stevenson Quarles sx  EGD 12/13/16 ? [facility] to have repeat EGD 3/18  improved  Nexium now 40 QD  Repeat 2021     Endocrine: Negative. Genitourinary: Positive for frequency and urgency.         Nocturia   Musculoskeletal: Positive for meds     Mixed hyperlipidemia  meds adjusted  Now on 80 mg of Atorvistatin Repeat labs in 3 mo.      BPH (benign prostatic hyperplasia)  occ symptoms as noted     Morbid obesity with BMI of 50.0-59.9, adult ([facility] [address]  Needs to diet and exercise     Hyperglycemia  Diet control

## 2020-03-11 RX ORDER — FENOFIBRATE 145 MG/1
TABLET, COATED ORAL
Qty: 90 TABLET | Refills: 1 | Status: SHIPPED | OUTPATIENT
Start: 2020-03-11 | End: 2020-07-13

## 2020-05-06 DIAGNOSIS — R73.9 HYPERGLYCEMIA: ICD-10-CM

## 2020-05-06 DIAGNOSIS — I35.0 NONRHEUMATIC AORTIC VALVE STENOSIS: ICD-10-CM

## 2020-05-06 DIAGNOSIS — I35.1 NONRHEUMATIC AORTIC VALVE INSUFFICIENCY: ICD-10-CM

## 2020-05-06 DIAGNOSIS — G47.30 SLEEP APNEA, UNSPECIFIED TYPE: ICD-10-CM

## 2020-05-06 DIAGNOSIS — K21.00 REFLUX ESOPHAGITIS: ICD-10-CM

## 2020-05-06 DIAGNOSIS — R35.1 BENIGN PROSTATIC HYPERPLASIA WITH NOCTURIA: ICD-10-CM

## 2020-05-06 DIAGNOSIS — E78.2 MIXED HYPERLIPIDEMIA: ICD-10-CM

## 2020-05-06 DIAGNOSIS — R00.2 PALPITATIONS: ICD-10-CM

## 2020-05-06 DIAGNOSIS — E66.01 MORBID OBESITY WITH BMI OF 50.0-59.9, ADULT (HCC): ICD-10-CM

## 2020-05-06 DIAGNOSIS — N40.1 BENIGN PROSTATIC HYPERPLASIA WITH NOCTURIA: ICD-10-CM

## 2020-05-06 DIAGNOSIS — I10 ESSENTIAL HYPERTENSION: ICD-10-CM

## 2020-05-06 LAB
A/G RATIO: 1.9 (ref 1.1–2.2)
ALBUMIN SERPL-MCNC: 4.6 G/DL (ref 3.4–5)
ALP BLD-CCNC: 47 U/L (ref 40–129)
ALT SERPL-CCNC: 24 U/L (ref 10–40)
ANION GAP SERPL CALCULATED.3IONS-SCNC: 15 MMOL/L (ref 3–16)
AST SERPL-CCNC: 25 U/L (ref 15–37)
BASOPHILS ABSOLUTE: 0 K/UL (ref 0–0.2)
BASOPHILS RELATIVE PERCENT: 0.6 %
BILIRUB SERPL-MCNC: 0.7 MG/DL (ref 0–1)
BUN BLDV-MCNC: 23 MG/DL (ref 7–20)
CALCIUM SERPL-MCNC: 9.6 MG/DL (ref 8.3–10.6)
CHLORIDE BLD-SCNC: 100 MMOL/L (ref 99–110)
CHOLESTEROL, TOTAL: 200 MG/DL (ref 0–199)
CO2: 24 MMOL/L (ref 21–32)
CREAT SERPL-MCNC: 1 MG/DL (ref 0.8–1.3)
EOSINOPHILS ABSOLUTE: 0.1 K/UL (ref 0–0.6)
EOSINOPHILS RELATIVE PERCENT: 1.5 %
ESTIMATED AVERAGE GLUCOSE: 116.9 MG/DL
GFR AFRICAN AMERICAN: >60
GFR NON-AFRICAN AMERICAN: >60
GLOBULIN: 2.4 G/DL
GLUCOSE BLD-MCNC: 134 MG/DL (ref 70–99)
HBA1C MFR BLD: 5.7 %
HCT VFR BLD CALC: 51.6 % (ref 40.5–52.5)
HDLC SERPL-MCNC: 42 MG/DL (ref 40–60)
HEMOGLOBIN: 17.6 G/DL (ref 13.5–17.5)
LDL CHOLESTEROL CALCULATED: 110 MG/DL
LYMPHOCYTES ABSOLUTE: 2.1 K/UL (ref 1–5.1)
LYMPHOCYTES RELATIVE PERCENT: 31.7 %
MCH RBC QN AUTO: 32.7 PG (ref 26–34)
MCHC RBC AUTO-ENTMCNC: 34.1 G/DL (ref 31–36)
MCV RBC AUTO: 95.9 FL (ref 80–100)
MONOCYTES ABSOLUTE: 0.6 K/UL (ref 0–1.3)
MONOCYTES RELATIVE PERCENT: 8.8 %
NEUTROPHILS ABSOLUTE: 3.8 K/UL (ref 1.7–7.7)
NEUTROPHILS RELATIVE PERCENT: 57.4 %
PDW BLD-RTO: 13.8 % (ref 12.4–15.4)
PLATELET # BLD: 208 K/UL (ref 135–450)
PMV BLD AUTO: 9.7 FL (ref 5–10.5)
POTASSIUM SERPL-SCNC: 4.3 MMOL/L (ref 3.5–5.1)
RBC # BLD: 5.38 M/UL (ref 4.2–5.9)
SODIUM BLD-SCNC: 139 MMOL/L (ref 136–145)
TOTAL PROTEIN: 7 G/DL (ref 6.4–8.2)
TRIGL SERPL-MCNC: 242 MG/DL (ref 0–150)
TSH REFLEX: 1.72 UIU/ML (ref 0.27–4.2)
VLDLC SERPL CALC-MCNC: 48 MG/DL
WBC # BLD: 6.6 K/UL (ref 4–11)

## 2020-05-08 ENCOUNTER — VIRTUAL VISIT (OUTPATIENT)
Dept: INTERNAL MEDICINE CLINIC | Age: 65
End: 2020-05-08
Payer: COMMERCIAL

## 2020-05-08 VITALS
BODY MASS INDEX: 40.43 KG/M2 | TEMPERATURE: 97.4 F | SYSTOLIC BLOOD PRESSURE: 140 MMHG | HEIGHT: 74 IN | HEART RATE: 70 BPM | DIASTOLIC BLOOD PRESSURE: 76 MMHG | WEIGHT: 315 LBS

## 2020-05-08 PROCEDURE — 99214 OFFICE O/P EST MOD 30 MIN: CPT | Performed by: INTERNAL MEDICINE

## 2020-05-08 PROCEDURE — 3017F COLORECTAL CA SCREEN DOC REV: CPT | Performed by: INTERNAL MEDICINE

## 2020-05-08 PROCEDURE — G8427 DOCREV CUR MEDS BY ELIG CLIN: HCPCS | Performed by: INTERNAL MEDICINE

## 2020-05-08 RX ORDER — ESOMEPRAZOLE MAGNESIUM 40 MG/1
40 CAPSULE, DELAYED RELEASE ORAL
COMMUNITY
End: 2020-06-24

## 2020-05-08 ASSESSMENT — ENCOUNTER SYMPTOMS
ALLERGIC/IMMUNOLOGIC NEGATIVE: 1
APNEA: 1
EYES NEGATIVE: 1
CHEST TIGHTNESS: 0
ROS SKIN COMMENTS: NEEDS TO SEE DERM
WHEEZING: 0
SHORTNESS OF BREATH: 0

## 2020-05-08 NOTE — PROGRESS NOTES
Appearance: He is obese. HENT:      Head: Normocephalic. Right Ear: External ear normal.      Left Ear: External ear normal.   Eyes:      Conjunctiva/sclera: Conjunctivae normal.      Pupils: Pupils are equal, round, and reactive to light. Neck:      Musculoskeletal: Normal range of motion. Pulmonary:      Effort: Pulmonary effort is normal.   Abdominal:      Comments: obese   Musculoskeletal: Normal range of motion. Skin:     Findings: No erythema, lesion or rash. Neurological:      General: No focal deficit present. Mental Status: He is alert and oriented to person, place, and time. Mental status is at baseline. Psychiatric:         Mood and Affect: Mood normal.         Behavior: Behavior normal.         Thought Content: Thought content normal.         BP (!) 140/76 (Site: Left Upper Arm, Position: Sitting) Comment: self reported  Pulse 70   Temp 97.4 °F (36.3 °C) (Oral)   Ht 6' 2\" (1.88 m)   Wt (!) 378 lb (171.5 kg) Comment: self reported  BMI 48.53 kg/m²       Assessment & Plan:         Nonrheumatic aortic valve insufficiency  Remains stable last ECHO was ok     Nonrheumatic aortic valve stenosis  Remains stable as noted     BPH (benign prostatic hyperplasia)  On and off symptoms check prostate with next CPE    Essential hypertension  Stable continue current meds and return in 3 mo. cpe     Hyperglycemia  Diet control labs ok needs to lose weight     Mixed hyperlipidemia  Stable continue current meds and return in 3 mo. cpe     Morbid obesity with BMI of 50.0-59.9, adult ([address]  Needs to cont to diet and to increase exercise     Palpitations  occ symptoms meds as noted     Reflux esophagitis  Controled with diet and meds     Sleep apnea  Cont to use CPAP    Georgie Merrill IV is a 63 y.o. male being evaluated by a Virtual Visit (video visit) encounter to address concerns as mentioned above. A caregiver was present when appropriate.  Due to this being a TeleHealth encounter

## 2020-06-24 RX ORDER — ESOMEPRAZOLE MAGNESIUM 40 MG/1
CAPSULE, DELAYED RELEASE ORAL
Qty: 90 CAPSULE | Refills: 1 | Status: SHIPPED | OUTPATIENT
Start: 2020-06-24 | End: 2020-08-21 | Stop reason: ALTCHOICE

## 2020-06-24 RX ORDER — ATORVASTATIN CALCIUM 80 MG/1
TABLET, FILM COATED ORAL
Qty: 90 TABLET | Refills: 3 | Status: SHIPPED | OUTPATIENT
Start: 2020-06-24 | End: 2021-06-22 | Stop reason: ALTCHOICE

## 2020-06-24 NOTE — TELEPHONE ENCOUNTER
Requested Prescriptions     Pending Prescriptions Disp Refills    atorvastatin (LIPITOR) 80 MG tablet [Pharmacy Med Name: ATORVASTATIN 80MG TABLETS] 90 tablet 3     Sig: TAKE 1 TABLET BY MOUTH EVERY DAY              Last Office Visit: 12/11/2019     Next Office Visit: 9/28/2020

## 2020-07-13 RX ORDER — HYDROCHLOROTHIAZIDE 25 MG/1
25 TABLET ORAL EVERY MORNING
Qty: 90 TABLET | Refills: 1 | Status: SHIPPED | OUTPATIENT
Start: 2020-07-13 | End: 2021-03-22 | Stop reason: ALTCHOICE

## 2020-07-13 RX ORDER — HYDROCHLOROTHIAZIDE 25 MG/1
25 TABLET ORAL EVERY MORNING
Qty: 90 TABLET | Refills: 1 | Status: SHIPPED | OUTPATIENT
Start: 2020-07-13 | End: 2020-08-21 | Stop reason: SDUPTHER

## 2020-07-13 RX ORDER — FENOFIBRATE 145 MG/1
TABLET, COATED ORAL
Qty: 120 TABLET | Refills: 1 | Status: SHIPPED | OUTPATIENT
Start: 2020-07-13 | End: 2021-03-06 | Stop reason: SDUPTHER

## 2020-08-21 ENCOUNTER — OFFICE VISIT (OUTPATIENT)
Dept: INTERNAL MEDICINE CLINIC | Age: 65
End: 2020-08-21
Payer: COMMERCIAL

## 2020-08-21 VITALS
HEIGHT: 74 IN | SYSTOLIC BLOOD PRESSURE: 110 MMHG | WEIGHT: 315 LBS | RESPIRATION RATE: 14 BRPM | TEMPERATURE: 97.5 F | BODY MASS INDEX: 40.43 KG/M2 | DIASTOLIC BLOOD PRESSURE: 70 MMHG | HEART RATE: 70 BPM

## 2020-08-21 PROBLEM — I71.21 ASCENDING AORTIC ANEURYSM (HCC): Status: ACTIVE | Noted: 2020-08-21

## 2020-08-21 PROBLEM — R91.1 LEFT LOWER LOBE PULMONARY NODULE: Status: ACTIVE | Noted: 2020-08-21

## 2020-08-21 PROCEDURE — 99396 PREV VISIT EST AGE 40-64: CPT | Performed by: INTERNAL MEDICINE

## 2020-08-21 RX ORDER — PANTOPRAZOLE SODIUM 40 MG/1
40 TABLET, DELAYED RELEASE ORAL DAILY
COMMUNITY
End: 2020-08-21

## 2020-08-21 RX ORDER — ESOMEPRAZOLE MAGNESIUM 40 MG/1
CAPSULE, DELAYED RELEASE ORAL
Qty: 90 CAPSULE | Refills: 2 | Status: SHIPPED | OUTPATIENT
Start: 2020-08-21 | End: 2021-05-17

## 2020-08-21 ASSESSMENT — ENCOUNTER SYMPTOMS
APNEA: 1
EYES NEGATIVE: 1
ALLERGIC/IMMUNOLOGIC NEGATIVE: 1
SHORTNESS OF BREATH: 0
ROS SKIN COMMENTS: NEEDS TO SEE DERM

## 2020-08-21 NOTE — ASSESSMENT & PLAN NOTE
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

## 2020-08-21 NOTE — PROGRESS NOTES
Subjective:      Patient ID: Lakshmi Celis is a 63 y.o. male. HPI  Here today for complete physical and review of chronic problems as listed under assessment and plan,no new c/o feels good  Other hosp at [address] for chest pain 7/20/20 W/U CAD negative ie a myoeview scan- but found 4.6 CM AAA and  LLL nodule seeing Dr at [address] as noted     Hypertension   This is a chronic problem. The current episode started more than 1 year ago. The problem is unchanged. The problem is controlled. Associated symptoms include chest pain (see hosp records ) and palpitations. Pertinent negatives include no shortness of breath. There are no associated agents to hypertension. Risk factors for coronary artery disease include dyslipidemia, male gender and obesity. Past treatments include ACE inhibitors, beta blockers and lifestyle changes. The current treatment provides significant improvement. There are no compliance problems. Hyperlipidemia   This is a chronic problem. The current episode started more than 1 year ago. The problem is controlled. Recent lipid tests were reviewed and are normal. There are no known factors aggravating his hyperlipidemia. Associated symptoms include chest pain (see hosp records ). Pertinent negatives include no shortness of breath. Current antihyperlipidemic treatment includes diet change, exercise, fibric acid derivatives and statins. The current treatment provides significant improvement of lipids. There are no compliance problems. Risk factors for coronary artery disease include dyslipidemia, hypertension, male sex and obesity.        Allergies   Allergen Reactions    Chocolate     Dairy Digestive [Lactase]     Other      dairy    Penicillins        Current Outpatient Medications   Medication Sig Dispense Refill    esomeprazole (NEXIUM) 40 MG delayed release capsule TAKE ONE CAPSULE BY MOUTH EVERY MORNING BEFORE BREAKFAST 90 capsule 2    hydroCHLOROthiazide (HYDRODIURIL) 25 MG tablet TAKE 1 TABLET BY MOUTH EVERY MORNING 90 tablet 1    fenofibrate (TRICOR) 145 MG tablet TAKE 1 TABLET BY MOUTH ONCE DAILY 120 tablet 1    atorvastatin (LIPITOR) 80 MG tablet TAKE 1 TABLET BY MOUTH EVERY DAY 90 tablet 3    metoprolol succinate (TOPROL XL) 25 MG extended release tablet TAKE 1 TABLET BY MOUTH DAILY 90 tablet 2    lisinopril (PRINIVIL;ZESTRIL) 40 MG tablet Take 1 tablet by mouth daily 90 tablet 3    triamcinolone (KENALOG) 0.1 % ointment APPLY TO LEGS BID FOR 2 WEEKS AT A TIME AS NEEDED FOR SCALING  3    aspirin 81 MG tablet Take 81 mg by mouth daily      clindamycin (CLEOCIN) 300 MG capsule Take 300 mg by mouth 3 times daily Before dental procedure      Multiple Vitamins-Minerals (MULTIVITAMIN ADULTS 50+ PO) Take by mouth daily      CIALIS 20 MG tablet TAKE AS DIRECTED 6 tablet 0    Cholecalciferol (VITAMIN D) 1000 UNIT CAPS Take 2,000 Units by mouth daily        No current facility-administered medications for this visit.         Past Medical History:   Diagnosis Date    Apnea     Hyperlipidemia     Kidney stone on right side 1/2016    passed ok     Syncope 10/2004    Unspecified sleep apnea     using C-PAP       Family History   Problem Relation Age of Onset    Cancer Mother         ovarian    Other Father         Pulmonary Fibrosis       Past Surgical History:   Procedure Laterality Date    COLONOSCOPY  01/26/2004    POLYP    COLONOSCOPY  12/13/2016    polyps Dr. Eliel Beckham repeat in 2020    ENDOSCOPY, COLON, [address], [address]    [place]    JOINT REPLACEMENT      left knee    JOINT REPLACEMENT Left 11/19/13    L TKR Dr Claudeen Harp Right 11/2014    R TKR Dr Rudy Fair  03/23/2018    Dr. Clint Forrest repeat in 2021       Social History     Socioeconomic History    Marital status:      Spouse name: Not on file    Number of children: Not on file    Years of education: Not on file    Highest education level: Not on file   Occupational History    Not on file   Social Needs    Financial resource strain: Not hard at all    Food insecurity     Worry: Never true     Inability: Never true   [employer] needs     Medical: No     Non-medical: No   Tobacco Use    Smoking status: Never Smoker    Smokeless tobacco: Never Used   Substance and Sexual Activity    Alcohol use: Yes     Comment: occ    Drug use: No    Sexual activity: Not on file   Lifestyle    Physical activity     Days per week: Not on file     Minutes per session: Not on file    Stress: Not on file   Relationships    Social connections     Talks on phone: Not on file     Gets together: Not on file     Attends Tenriism service: Not on file     Active member of club or organization: Not on file     Attends meetings of clubs or organizations: Not on file     Relationship status: Not on file    Intimate partner violence     Fear of current or ex partner: Not on file     Emotionally abused: Not on file     Physically abused: Not on file     Forced sexual activity: Not on file   Other Topics Concern    Not on file   Social History Narrative    ** Merged History Encounter **            Review of Systems  Review of Systems   Constitutional: Negative. Negative for chills, diaphoresis and unexpected weight change (down 8# ). HENT: Negative. Eyes: Negative. Respiratory: Positive for apnea (improved using C-pap ). Negative for shortness of breath. Cardiovascular: Positive for chest pain (see hosp records ) and palpitations. Negative for leg swelling. Gastrointestinal:        Vladimir Izquierdo sx  EGD 12/13/16 ? [facility] tEGD 3/18  Nexium now 40 BID -repeat 2021  Colonoscopy 2016 polyps repeat 2020    Endocrine: Negative. Genitourinary: Positive for frequency and urgency. Nocturia   Musculoskeletal: Positive for gait problem (s/p bilat TKR ambulating improved ).    Skin:        Needs to see derm Allergic/Immunologic: Negative. Hematological: Negative. Psychiatric/Behavioral: Negative. Objective:   Physical Exam:  Physical Exam  Constitutional:       Appearance: He is well-developed. He is obese. HENT:      Head: Normocephalic and atraumatic. Right Ear: Tympanic membrane, ear canal and external ear normal.      Left Ear: Tympanic membrane, ear canal and external ear normal.   Eyes:      Conjunctiva/sclera: Conjunctivae normal.      Pupils: Pupils are equal, round, and reactive to light. Neck:      Musculoskeletal: Normal range of motion and neck supple. Thyroid: No thyromegaly. Trachea: No tracheal deviation. Cardiovascular:      Rate and Rhythm: Normal rate and regular rhythm. Pulses: Normal pulses. Heart sounds: Murmur (3/^ AS ) present. Pulmonary:      Effort: Pulmonary effort is normal.      Breath sounds: Normal breath sounds. Abdominal:      General: Abdomen is flat. Bowel sounds are normal.      Palpations: Abdomen is soft. Comments: obese   Genitourinary:     Penis: Normal.       Rectum: Normal. Guaiac result negative. Comments: Slight increase in prostate size no tenderness  Musculoskeletal: Normal range of motion. General: Tenderness: knees  improved. Skin:     General: Skin is warm and dry. Neurological:      General: No focal deficit present. Mental Status: He is alert and oriented to person, place, and time. Mental status is at baseline. Deep Tendon Reflexes: Reflexes are normal and symmetric. Psychiatric:         Mood and Affect: Mood normal.         Behavior: Behavior normal.         Thought Content:  Thought content normal.         /70 (Site: Right Upper Arm, Position: Sitting, Cuff Size: Medium Adult)   Pulse 70   Temp 97.5 °F (36.4 °C)   Resp 14   Ht 6' 2\" (1.88 m)   Wt (!) 370 lb (167.8 kg)   BMI 47.51 kg/m²       Assessment & Plan:     Nonrheumatic aortic valve insufficiency  Cont to F/U now

## 2020-09-01 ENCOUNTER — TELEPHONE (OUTPATIENT)
Dept: INTERNAL MEDICINE CLINIC | Age: 65
End: 2020-09-01

## 2020-09-01 ENCOUNTER — OFFICE VISIT (OUTPATIENT)
Dept: PRIMARY CARE CLINIC | Age: 65
End: 2020-09-01
Payer: COMMERCIAL

## 2020-09-01 PROCEDURE — G8417 CALC BMI ABV UP PARAM F/U: HCPCS | Performed by: NURSE PRACTITIONER

## 2020-09-01 PROCEDURE — G8428 CUR MEDS NOT DOCUMENT: HCPCS | Performed by: NURSE PRACTITIONER

## 2020-09-01 PROCEDURE — 99211 OFF/OP EST MAY X REQ PHY/QHP: CPT | Performed by: NURSE PRACTITIONER

## 2020-09-01 NOTE — TELEPHONE ENCOUNTER
FYI- patient is report a cough and fever of 99.5, which is a fever for this patient,  Advised of test site at [facility]

## 2020-09-01 NOTE — PROGRESS NOTES
Link Duvall ELLE received a viral test for COVID-19. They were educated on isolation and quarantine as appropriate. For any symptoms, they were directed to seek care from their PCP, given contact information to establish with a doctor, directed to an urgent care or the emergency room.

## 2020-09-03 LAB — SARS-COV-2, NAA: DETECTED

## 2020-09-05 ENCOUNTER — PATIENT MESSAGE (OUTPATIENT)
Dept: INTERNAL MEDICINE CLINIC | Age: 65
End: 2020-09-05

## 2020-09-08 NOTE — TELEPHONE ENCOUNTER
That was an FYI note I got on 9/1 telling you to go get tested - cont isolation for at least 14 days if at that time still running a temp cont isolation until no temp at least 3 days  Cont to Tx symptoms etc  Let us know if SOB or SANCHES get worse would suggest getting a pulseoximeter for home if you get one let us know if O 2 drops less jaxson 90%

## 2020-09-08 NOTE — TELEPHONE ENCOUNTER
From: Luciano Che IV  To: Rosalba Simms MD  Sent: 9/5/2020 10:47 AM EDT  Subject: Test Results Question    Hi Dr. Troy Ojeda, I called your office on Tuesday to speak with you about my health, but didn't receive a call back. I had a Covid test and it was positive. I'm coming up on 7 days of fever and minor cough. My wife Jeremy Wetzel tested negative for Covid, so she is taking care of me.

## 2020-09-14 ENCOUNTER — OFFICE VISIT (OUTPATIENT)
Dept: PRIMARY CARE CLINIC | Age: 65
End: 2020-09-14
Payer: COMMERCIAL

## 2020-09-14 PROCEDURE — G8428 CUR MEDS NOT DOCUMENT: HCPCS | Performed by: NURSE PRACTITIONER

## 2020-09-14 PROCEDURE — 99211 OFF/OP EST MAY X REQ PHY/QHP: CPT | Performed by: NURSE PRACTITIONER

## 2020-09-14 PROCEDURE — G8417 CALC BMI ABV UP PARAM F/U: HCPCS | Performed by: NURSE PRACTITIONER

## 2020-09-14 NOTE — PROGRESS NOTES
Gladis Damon ELLE received a viral test for COVID-19. They were educated on isolation and quarantine as appropriate. For any symptoms, they were directed to seek care from their PCP, given contact information to establish with a doctor, directed to an urgent care or the emergency room.

## 2020-09-18 LAB — SARS-COV-2, NAA: ABNORMAL

## 2020-09-20 PROBLEM — Z00.00 WELL ADULT EXAM: Status: RESOLVED | Noted: 2017-01-17 | Resolved: 2020-09-20

## 2020-11-23 ENCOUNTER — OFFICE VISIT (OUTPATIENT)
Dept: INTERNAL MEDICINE CLINIC | Age: 65
End: 2020-11-23
Payer: COMMERCIAL

## 2020-11-23 VITALS
HEART RATE: 68 BPM | TEMPERATURE: 97.6 F | DIASTOLIC BLOOD PRESSURE: 80 MMHG | SYSTOLIC BLOOD PRESSURE: 118 MMHG | RESPIRATION RATE: 14 BRPM | BODY MASS INDEX: 48.48 KG/M2 | WEIGHT: 315 LBS

## 2020-11-23 PROBLEM — U07.1 COVID-19: Status: ACTIVE | Noted: 2020-11-23

## 2020-11-23 PROBLEM — R91.1 LEFT LOWER LOBE PULMONARY NODULE: Status: RESOLVED | Noted: 2020-08-21 | Resolved: 2020-11-23

## 2020-11-23 PROCEDURE — 1036F TOBACCO NON-USER: CPT | Performed by: INTERNAL MEDICINE

## 2020-11-23 PROCEDURE — 90471 IMMUNIZATION ADMIN: CPT | Performed by: INTERNAL MEDICINE

## 2020-11-23 PROCEDURE — G8484 FLU IMMUNIZE NO ADMIN: HCPCS | Performed by: INTERNAL MEDICINE

## 2020-11-23 PROCEDURE — 99214 OFFICE O/P EST MOD 30 MIN: CPT | Performed by: INTERNAL MEDICINE

## 2020-11-23 PROCEDURE — 90686 IIV4 VACC NO PRSV 0.5 ML IM: CPT | Performed by: INTERNAL MEDICINE

## 2020-11-23 PROCEDURE — G8417 CALC BMI ABV UP PARAM F/U: HCPCS | Performed by: INTERNAL MEDICINE

## 2020-11-23 PROCEDURE — G8427 DOCREV CUR MEDS BY ELIG CLIN: HCPCS | Performed by: INTERNAL MEDICINE

## 2020-11-23 PROCEDURE — 3017F COLORECTAL CA SCREEN DOC REV: CPT | Performed by: INTERNAL MEDICINE

## 2020-11-23 ASSESSMENT — ENCOUNTER SYMPTOMS
EYES NEGATIVE: 1
ALLERGIC/IMMUNOLOGIC NEGATIVE: 1
SHORTNESS OF BREATH: 0
ROS SKIN COMMENTS: NEEDS TO SEE DERM
APNEA: 1

## 2020-11-23 NOTE — PROGRESS NOTES
Vaccine Information Sheet, \"Influenza - Inactivated\"  given to Indu Dawson ELLE, or parent/legal guardian of  Mohsen Easley and verbalized understanding. Patient responses:    Have you ever had a reaction to a flu vaccine? No  Do you have any current illness? No  Have you ever had [place] Syndrome? No  Do you have a serious allergy to any of the follow: Neomycin, Polymyxin, Thimerosal, eggs or egg products? No    Flu vaccine given per order. Please see immunization tab. Risks and benefits explained. Current VIS given.       Immunizations Administered     Name Date Dose Route    Influenza, Quadv, IM, PF (6 mo and older Fluzone, Flulaval, Fluarix, and 3 yrs and older Afluria) 11/23/2020 0.5 mL Intramuscular    Site: Deltoid- Left    Lot: [id number]    NDC: 13408-266-78

## 2020-11-23 NOTE — PROGRESS NOTES
Subjective:      Patient ID: Vidal Mohr is a 63 y.o. male. HPI  HPI    Allergies   Allergen Reactions    Chocolate     Ciprofloxacin      ? Cardiac side effects     Dairy Digestive [Lactase]     Other      dairy    Penicillins        Current Outpatient Medications   Medication Sig Dispense Refill    esomeprazole (NEXIUM) 40 MG delayed release capsule TAKE ONE CAPSULE BY MOUTH EVERY MORNING BEFORE BREAKFAST 90 capsule 2    hydroCHLOROthiazide (HYDRODIURIL) 25 MG tablet TAKE 1 TABLET BY MOUTH EVERY MORNING 90 tablet 1    fenofibrate (TRICOR) 145 MG tablet TAKE 1 TABLET BY MOUTH ONCE DAILY 120 tablet 1    atorvastatin (LIPITOR) 80 MG tablet TAKE 1 TABLET BY MOUTH EVERY DAY 90 tablet 3    metoprolol succinate (TOPROL XL) 25 MG extended release tablet TAKE 1 TABLET BY MOUTH DAILY 90 tablet 2    lisinopril (PRINIVIL;ZESTRIL) 40 MG tablet Take 1 tablet by mouth daily 90 tablet 3    triamcinolone (KENALOG) 0.1 % ointment APPLY TO LEGS BID FOR 2 WEEKS AT A TIME AS NEEDED FOR SCALING  3    aspirin 81 MG tablet Take 81 mg by mouth daily      clindamycin (CLEOCIN) 300 MG capsule Take 300 mg by mouth 3 times daily Before dental procedure      Multiple Vitamins-Minerals (MULTIVITAMIN ADULTS 50+ PO) Take by mouth daily      CIALIS 20 MG tablet TAKE AS DIRECTED 6 tablet 0    Cholecalciferol (VITAMIN D) 1000 UNIT CAPS Take 2,000 Units by mouth daily        No current facility-administered medications for this visit.         Past Medical History:   Diagnosis Date    Apnea     Hyperlipidemia     Kidney stone on right side 1/2016    passed ok     Syncope 10/2004    Unspecified sleep apnea     using C-PAP       Family History   Problem Relation Age of Onset    Cancer Mother         ovarian    Other Father         Pulmonary Fibrosis       Past Surgical History:   Procedure Laterality Date    COLONOSCOPY  01/26/2004    POLYP    COLONOSCOPY  12/13/2016    polyps Dr. Uli Palma repeat in 2020   [facility] ENDOSCOPY, COLON, DIAGNOSTIC      HERNIA REPAIR      [place] RE[place]  1998, [place]    JOINT REPLACEMENT      left knee    JOINT REPLACEMENT Left 11/19/13    L TKR Dr Alexey Tamayo Right 11/2014    R TKR Dr Parra Board  03/23/2018    Dr. Bhavana Rogers repeat in 2021       Social History     Socioeconomic History    Marital status:      Spouse name: Not on file    Number of children: Not on file    Years of education: Not on file    Highest education level: Not on file   Occupational History    Not on file   Social Needs    Financial resource strain: Not hard at all   [place] insecurity     Worry: Never true     Inability: Never true   [employer] needs     Medical: No     Non-medical: No   Tobacco Use    Smoking status: Never Smoker    Smokeless tobacco: Never Used   Substance and Sexual Activity    Alcohol use: Yes     Comment: occ    Drug use: No    Sexual activity: Not on file   Lifestyle    Physical activity     Days per week: Not on file     Minutes per session: Not on file    Stress: Not on file   Relationships    Social connections     Talks on phone: Not on file     Gets together: Not on file     Attends [employer] service: Not on file     Active member of club or organization: Not on file     Attends meetings of clubs or organizations: Not on file     Relationship status: Not on file    Intimate partner violence     Fear of current or ex partner: Not on file     Emotionally abused: Not on file     Physically abused: Not on file     Forced sexual activity: Not on file   Other Topics Concern    Not on file   Social History Narrative    ** Merged History Encounter **            Review of Systems  Review of Systems   Constitutional: Negative. Negative for chills, diaphoresis and unexpected weight change (up 7#). HENT: Negative. Eyes: Negative. Respiratory: Positive for apnea (improved using C-pap ).  Negative for shortness of breath. Cardiovascular: Negative for leg swelling. Chest pain: see hosp records         Ascending aortic aneurysm and sever AS see records 7/2020     Gastrointestinal:        Felix Cuenca sx  EGD 12/13/16 ? [facility] tEGD 3/18  Nexium now 40 BID -repeat 2021  Colonoscopy 2016 polyps repeat 2020    Endocrine: Negative. Genitourinary: Positive for frequency and urgency. Nocturia   Musculoskeletal: Positive for gait problem (s/p bilat TKR ambulating improved ). Skin:        Needs to see derm     Allergic/Immunologic: Negative. Hematological: Negative. Psychiatric/Behavioral: Negative. Objective:   Physical Exam:  Physical Exam  Constitutional:       Appearance: He is well-developed. He is obese. HENT:      Head: Normocephalic and atraumatic. Right Ear: Tympanic membrane, ear canal and external ear normal.      Left Ear: Tympanic membrane, ear canal and external ear normal.   Eyes:      Conjunctiva/sclera: Conjunctivae normal.      Pupils: Pupils are equal, round, and reactive to light. Neck:      Musculoskeletal: Normal range of motion and neck supple. Thyroid: No thyromegaly. Trachea: No tracheal deviation. Cardiovascular:      Rate and Rhythm: Normal rate and regular rhythm. Pulses: Normal pulses. Heart sounds: Murmur (3/^ AS ) present. Pulmonary:      Effort: Pulmonary effort is normal.      Breath sounds: Normal breath sounds. Abdominal:      General: Abdomen is flat. Bowel sounds are normal.      Palpations: Abdomen is soft. Comments: obese   Genitourinary:     Penis: Normal.       Rectum: Normal. Guaiac result negative. Comments: Slight increase in prostate size no tenderness  Musculoskeletal: Normal range of motion. General: Tenderness: knees  improved. Skin:     General: Skin is warm and dry. Neurological:      General: No focal deficit present. Mental Status: He is alert and oriented to person, place, and time.

## 2020-11-23 NOTE — ASSESSMENT & PLAN NOTE
No symptoms Found 7/20 was 4.6 cm to F/U with Dr Jessica Presley and Nando Allred as noted will need repair in future

## 2020-11-23 NOTE — ASSESSMENT & PLAN NOTE
Had +test on 9/1/20 and indeterminate on 9/14 had cough and congest and low grasde temp no symptoms now will check antibodies

## 2021-01-01 NOTE — PROGRESS NOTES
Vaccine Information Sheet, \"Influenza - Inactivated\"  given to Alexi Gallo, or parent/legal guardian of  Alexi Gallo and verbalized understanding. Patient responses:    Have you ever had a reaction to a flu vaccine? No  Are you able to eat eggs without adverse effects? Yes  Do you have any current illness? No  Have you ever had Guillian Bricelyn Syndrome? No    Flu vaccine given per order. Please see immunization tab. 30

## 2021-03-08 ENCOUNTER — IMMUNIZATION (OUTPATIENT)
Dept: PRIMARY CARE CLINIC | Age: 66
End: 2021-03-08
Payer: COMMERCIAL

## 2021-03-08 PROCEDURE — 91301 COVID-19, MODERNA VACCINE 100MCG/0.5ML DOSE: CPT | Performed by: FAMILY MEDICINE

## 2021-03-08 PROCEDURE — 0011A COVID-19, MODERNA VACCINE 100MCG/0.5ML DOSE: CPT | Performed by: FAMILY MEDICINE

## 2021-03-08 RX ORDER — FENOFIBRATE 145 MG/1
TABLET, COATED ORAL
Qty: 120 TABLET | Refills: 1 | Status: SHIPPED | OUTPATIENT
Start: 2021-03-08 | End: 2022-08-08 | Stop reason: DRUGHIGH

## 2021-03-08 RX ORDER — FENOFIBRATE 145 MG/1
145 TABLET, COATED ORAL DAILY
Qty: 120 TABLET | Refills: 2 | Status: SHIPPED | OUTPATIENT
Start: 2021-03-08 | End: 2021-09-22 | Stop reason: SDUPTHER

## 2021-03-22 ENCOUNTER — OFFICE VISIT (OUTPATIENT)
Dept: INTERNAL MEDICINE CLINIC | Age: 66
End: 2021-03-22
Payer: COMMERCIAL

## 2021-03-22 VITALS
WEIGHT: 315 LBS | SYSTOLIC BLOOD PRESSURE: 130 MMHG | HEIGHT: 74 IN | RESPIRATION RATE: 14 BRPM | DIASTOLIC BLOOD PRESSURE: 70 MMHG | BODY MASS INDEX: 40.43 KG/M2 | OXYGEN SATURATION: 95 % | TEMPERATURE: 97.3 F | HEART RATE: 73 BPM

## 2021-03-22 DIAGNOSIS — E66.01 MORBID OBESITY WITH BMI OF 50.0-59.9, ADULT (HCC): ICD-10-CM

## 2021-03-22 DIAGNOSIS — R73.9 HYPERGLYCEMIA: ICD-10-CM

## 2021-03-22 DIAGNOSIS — I71.21 ASCENDING AORTIC ANEURYSM: ICD-10-CM

## 2021-03-22 DIAGNOSIS — Z11.59 ENCOUNTER FOR HCV SCREENING TEST FOR LOW RISK PATIENT: Primary | ICD-10-CM

## 2021-03-22 DIAGNOSIS — I71.21 ASCENDING AORTIC ANEURYSM (HCC): ICD-10-CM

## 2021-03-22 DIAGNOSIS — R91.1 LEFT LOWER LOBE PULMONARY NODULE: ICD-10-CM

## 2021-03-22 DIAGNOSIS — I35.0 NONRHEUMATIC AORTIC VALVE STENOSIS: ICD-10-CM

## 2021-03-22 DIAGNOSIS — K21.00 REFLUX ESOPHAGITIS: ICD-10-CM

## 2021-03-22 DIAGNOSIS — G47.30 SLEEP APNEA, UNSPECIFIED TYPE: ICD-10-CM

## 2021-03-22 DIAGNOSIS — I10 ESSENTIAL HYPERTENSION: ICD-10-CM

## 2021-03-22 DIAGNOSIS — Z11.59 ENCOUNTER FOR HCV SCREENING TEST FOR LOW RISK PATIENT: ICD-10-CM

## 2021-03-22 DIAGNOSIS — R00.2 PALPITATIONS: ICD-10-CM

## 2021-03-22 DIAGNOSIS — Z11.4 SCREENING FOR HIV WITHOUT PRESENCE OF RISK FACTORS: ICD-10-CM

## 2021-03-22 DIAGNOSIS — N40.0 BENIGN PROSTATIC HYPERPLASIA WITHOUT LOWER URINARY TRACT SYMPTOMS: ICD-10-CM

## 2021-03-22 DIAGNOSIS — I35.1 NONRHEUMATIC AORTIC VALVE INSUFFICIENCY: ICD-10-CM

## 2021-03-22 DIAGNOSIS — K21.00 GASTROESOPHAGEAL REFLUX DISEASE WITH ESOPHAGITIS, UNSPECIFIED WHETHER HEMORRHAGE: ICD-10-CM

## 2021-03-22 DIAGNOSIS — E78.2 MIXED HYPERLIPIDEMIA: ICD-10-CM

## 2021-03-22 DIAGNOSIS — M25.562 ARTHRALGIA OF LEFT LOWER LEG: ICD-10-CM

## 2021-03-22 DIAGNOSIS — U07.1 COVID-19: ICD-10-CM

## 2021-03-22 LAB
A/G RATIO: 1.6 (ref 1.1–2.2)
ALBUMIN SERPL-MCNC: 4.3 G/DL (ref 3.4–5)
ALP BLD-CCNC: 46 U/L (ref 40–129)
ALT SERPL-CCNC: 26 U/L (ref 10–40)
ANION GAP SERPL CALCULATED.3IONS-SCNC: 13 MMOL/L (ref 3–16)
AST SERPL-CCNC: 29 U/L (ref 15–37)
BASOPHILS ABSOLUTE: 0 K/UL (ref 0–0.2)
BASOPHILS RELATIVE PERCENT: 0.6 %
BILIRUB SERPL-MCNC: 0.7 MG/DL (ref 0–1)
BUN BLDV-MCNC: 22 MG/DL (ref 7–20)
CALCIUM SERPL-MCNC: 9.1 MG/DL (ref 8.3–10.6)
CHLORIDE BLD-SCNC: 105 MMOL/L (ref 99–110)
CHOLESTEROL, TOTAL: 204 MG/DL (ref 0–199)
CO2: 23 MMOL/L (ref 21–32)
CREAT SERPL-MCNC: 1 MG/DL (ref 0.8–1.3)
EOSINOPHILS ABSOLUTE: 0.1 K/UL (ref 0–0.6)
EOSINOPHILS RELATIVE PERCENT: 2 %
GFR AFRICAN AMERICAN: >60
GFR NON-AFRICAN AMERICAN: >60
GLOBULIN: 2.7 G/DL
GLUCOSE BLD-MCNC: 121 MG/DL (ref 70–99)
HCT VFR BLD CALC: 46.7 % (ref 40.5–52.5)
HDLC SERPL-MCNC: 40 MG/DL (ref 40–60)
HEMOGLOBIN: 16 G/DL (ref 13.5–17.5)
HEPATITIS C ANTIBODY INTERPRETATION: NORMAL
LDL CHOLESTEROL CALCULATED: 123 MG/DL
LYMPHOCYTES ABSOLUTE: 2.1 K/UL (ref 1–5.1)
LYMPHOCYTES RELATIVE PERCENT: 31.1 %
MCH RBC QN AUTO: 32.9 PG (ref 26–34)
MCHC RBC AUTO-ENTMCNC: 34.2 G/DL (ref 31–36)
MCV RBC AUTO: 96.1 FL (ref 80–100)
MONOCYTES ABSOLUTE: 0.6 K/UL (ref 0–1.3)
MONOCYTES RELATIVE PERCENT: 9.7 %
NEUTROPHILS ABSOLUTE: 3.8 K/UL (ref 1.7–7.7)
NEUTROPHILS RELATIVE PERCENT: 56.6 %
PDW BLD-RTO: 13.9 % (ref 12.4–15.4)
PLATELET # BLD: 194 K/UL (ref 135–450)
PMV BLD AUTO: 9.6 FL (ref 5–10.5)
POTASSIUM SERPL-SCNC: 4.2 MMOL/L (ref 3.5–5.1)
PROSTATE SPECIFIC ANTIGEN: 2.88 NG/ML (ref 0–4)
RBC # BLD: 4.86 M/UL (ref 4.2–5.9)
SODIUM BLD-SCNC: 141 MMOL/L (ref 136–145)
TOTAL PROTEIN: 7 G/DL (ref 6.4–8.2)
TRIGL SERPL-MCNC: 206 MG/DL (ref 0–150)
TSH REFLEX: 1.53 UIU/ML (ref 0.27–4.2)
VLDLC SERPL CALC-MCNC: 41 MG/DL
WBC # BLD: 6.7 K/UL (ref 4–11)

## 2021-03-22 PROCEDURE — G8417 CALC BMI ABV UP PARAM F/U: HCPCS | Performed by: INTERNAL MEDICINE

## 2021-03-22 PROCEDURE — 99214 OFFICE O/P EST MOD 30 MIN: CPT | Performed by: INTERNAL MEDICINE

## 2021-03-22 PROCEDURE — 1036F TOBACCO NON-USER: CPT | Performed by: INTERNAL MEDICINE

## 2021-03-22 PROCEDURE — 3017F COLORECTAL CA SCREEN DOC REV: CPT | Performed by: INTERNAL MEDICINE

## 2021-03-22 PROCEDURE — G8482 FLU IMMUNIZE ORDER/ADMIN: HCPCS | Performed by: INTERNAL MEDICINE

## 2021-03-22 PROCEDURE — G8427 DOCREV CUR MEDS BY ELIG CLIN: HCPCS | Performed by: INTERNAL MEDICINE

## 2021-03-22 RX ORDER — POTASSIUM CHLORIDE 750 MG/1
10 TABLET, FILM COATED, EXTENDED RELEASE ORAL DAILY PRN
COMMUNITY
Start: 2021-02-23

## 2021-03-22 RX ORDER — FUROSEMIDE 40 MG/1
40 TABLET ORAL DAILY PRN
COMMUNITY
Start: 2021-02-23 | End: 2022-02-07 | Stop reason: DRUGHIGH

## 2021-03-22 ASSESSMENT — ENCOUNTER SYMPTOMS
SHORTNESS OF BREATH: 0
APNEA: 1
ROS SKIN COMMENTS: NEEDS TO SEE DERM
EYES NEGATIVE: 1
ALLERGIC/IMMUNOLOGIC NEGATIVE: 1

## 2021-03-22 ASSESSMENT — PATIENT HEALTH QUESTIONNAIRE - PHQ9
2. FEELING DOWN, DEPRESSED OR HOPELESS: 0
SUM OF ALL RESPONSES TO PHQ QUESTIONS 1-9: 0
SUM OF ALL RESPONSES TO PHQ QUESTIONS 1-9: 0

## 2021-03-22 NOTE — PROGRESS NOTES
Subjective:      Patient ID: Stanley Urrutia is a 63 y.o. male. HPI   Here today for follow up of chronic problems as per HPI and as problems listed under assessment and plan,no new c/o feels good did get 1 st COVID vaccination     Hypertension  This is a chronic problem. The current episode started more than 1 year ago. The problem is unchanged. The problem is controlled. Associated symptoms include chest pain (see hosp records ) and palpitations. Pertinent negatives include no shortness of breath. There are no associated agents to hypertension. Risk factors for coronary artery disease include dyslipidemia, male gender and obesity. Past treatments include ACE inhibitors, beta blockers and lifestyle changes. The current treatment provides significant improvement. There are no compliance problems. Hyperlipidemia  This is a chronic problem. The current episode started more than 1 year ago. The problem is controlled. Recent lipid tests were reviewed and are normal. There are no known factors aggravating his hyperlipidemia. Associated symptoms include chest pain (see hosp records ). Pertinent negatives include no shortness of breath. Current antihyperlipidemic treatment includes diet change, exercise, fibric acid derivatives and statins. The current treatment provides significant improvement of lipids. There are no compliance problems. Risk factors for coronary artery disease include dyslipidemia, hypertension, male sex and obesity. Allergies   Allergen Reactions    Chocolate     Ciprofloxacin      ?  Cardiac side effects     Dairy Digestive [Lactase]     Other      dairy    Penicillins        Current Outpatient Medications   Medication Sig Dispense Refill    furosemide (LASIX) 40 MG tablet Take 40 mg by mouth daily as needed      potassium chloride (KLOR-CON) 10 MEQ extended release tablet Take 10 mEq by mouth daily      fenofibrate (TRICOR) 145 MG tablet TAKE 1 TABLET BY MOUTH ONCE DAILY 120 Not on file    Number of children: Not on file    Years of education: Not on file    Highest education level: Not on file   Occupational History    Not on file   Social Needs    Financial resource strain: Not hard at all    Food insecurity     Worry: Never true     Inability: Never true   [employer] needs     Medical: No     Non-medical: No   Tobacco Use    Smoking status: Never Smoker    Smokeless tobacco: Never Used   Substance and Sexual Activity    Alcohol use: Yes     Comment: occ    Drug use: No    Sexual activity: Not on file   Lifestyle    Physical activity     Days per week: Not on file     Minutes per session: Not on file    Stress: Not on file   Relationships    Social connections     Talks on phone: Not on file     Gets together: Not on file     Attends Religion service: Not on file     Active member of club or organization: Not on file     Attends meetings of clubs or organizations: Not on file     Relationship status: Not on file    Intimate partner violence     Fear of current or ex partner: Not on file     Emotionally abused: Not on file     Physically abused: Not on file     Forced sexual activity: Not on file   Other Topics Concern    Not on file   Social History Narrative    ** Merged History Encounter **            Review of Systems  Review of Systems   Constitutional: Negative. Negative for chills, diaphoresis and unexpected weight change (up 7#). HENT: Negative. Eyes: Negative. Respiratory: Positive for apnea (improved using C-pap ). Negative for shortness of breath. Cardiovascular: Positive for chest pain (see hosp records ) and palpitations. Negative for leg swelling. Ascending aortic aneurysm and sever AS see records 7/2020     Gastrointestinal:        Smiley Munoz sx  EGD 12/13/16 ? [facility] tEGD 3/18  Nexium now 40 BID -repeat 2021  Colonoscopy 2016 polyps repeat 2020    Endocrine: Negative. Genitourinary: Positive for frequency and urgency. Nocturia   Musculoskeletal: Positive for gait problem (s/p bilat TKR ambulating improved ). Skin:        Needs to see derm     Allergic/Immunologic: Negative. Hematological: Negative. Psychiatric/Behavioral: Negative. Objective:   Physical Exam:  Physical Exam  Constitutional:       Appearance: He is well-developed. He is obese. HENT:      Head: Normocephalic and atraumatic. Right Ear: External ear normal.      Left Ear: External ear normal.   Eyes:      Conjunctiva/sclera: Conjunctivae normal.      Pupils: Pupils are equal, round, and reactive to light. Neck:      Musculoskeletal: Normal range of motion and neck supple. Thyroid: No thyromegaly. Trachea: No tracheal deviation. Cardiovascular:      Rate and Rhythm: Normal rate and regular rhythm. Pulses: Normal pulses. Heart sounds: Murmur (3/^ AS ) present. Pulmonary:      Effort: Pulmonary effort is normal.      Breath sounds: Normal breath sounds. Abdominal:      General: Abdomen is flat. Bowel sounds are normal.      Palpations: Abdomen is soft. Comments: obese   Genitourinary:     Rectum: Guaiac result negative. Musculoskeletal: Normal range of motion. General: Tenderness: knees  improved. Skin:     General: Skin is warm and dry. Neurological:      General: No focal deficit present. Mental Status: He is alert and oriented to person, place, and time. Mental status is at baseline. Deep Tendon Reflexes: Reflexes are normal and symmetric. Psychiatric:         Mood and Affect: Mood normal.         Behavior: Behavior normal.         Thought Content:  Thought content normal.         Pulse 73   Temp 97.3 °F (36.3 °C) (Temporal)   Resp 14   Ht 6' 2\" (1.88 m)   Wt (!) 378 lb (171.5 kg)   SpO2 95%   BMI 48.53 kg/m²       Assessment & Plan:         Nonrheumatic aortic valve stenosis  Cont repeat studies 7/21  May need repair in future     Nonrheumatic aortic valve insufficiency  Cont repeat studies 7/21  May need repair in future     Essential hypertension  Stable continue current meds and return in 3 mo.    needs to check BP at home     COVID-19  Has recovered no residue     Ascending aortic aneurysm ([facility] [address]  Found 7/20 was 4.6 cm to F/U with Dr Franklyn Goncalves and Tere Noble as noted has remained  Stable  Will ? need repair in future     Hyperglycemia  Diet controled     Sleep apnea  Remains stable as noted     Palpitations  No recent symptoms cont to F/U as noted     Reflux esophagitis  Controled with diet and meds     Mixed hyperlipidemia  Stable no change in meds return in 3 mo.  Labs pending may need to change to Crestor  Since maxed out on Lipitor     Morbid obesity with BMI of 50.0-59.9, adult ([facility] [address]  Needs to diet and exercise

## 2021-03-23 DIAGNOSIS — Z01.89 ROUTINE LAB DRAW: Primary | ICD-10-CM

## 2021-03-23 LAB
ESTIMATED AVERAGE GLUCOSE: 114 MG/DL
HBA1C MFR BLD: 5.6 %
HIV AG/AB: NORMAL
HIV ANTIGEN: NORMAL
HIV-1 ANTIBODY: NORMAL
HIV-2 AB: NORMAL

## 2021-03-23 RX ORDER — ROSUVASTATIN CALCIUM 20 MG/1
20 TABLET, COATED ORAL NIGHTLY
Qty: 30 TABLET | Refills: 3 | Status: SHIPPED | OUTPATIENT
Start: 2021-03-23 | End: 2021-08-18

## 2021-03-26 ENCOUNTER — PATIENT MESSAGE (OUTPATIENT)
Dept: INTERNAL MEDICINE CLINIC | Age: 66
End: 2021-03-26

## 2021-03-26 NOTE — TELEPHONE ENCOUNTER
From: Alanna Barahona IV  To: Rena Ames MD  Sent: 3/26/2021 11:26 AM EDT  Subject: Prescription Question    Dr. Xiao Thomas has prescribed Crestor and I have filled the prescription. Should I now STOP taking the atorvastatin (Lipitor)?

## 2021-04-05 ENCOUNTER — IMMUNIZATION (OUTPATIENT)
Dept: PRIMARY CARE CLINIC | Age: 66
End: 2021-04-05
Payer: COMMERCIAL

## 2021-04-05 PROCEDURE — 0012A COVID-19, MODERNA VACCINE 100MCG/0.5ML DOSE: CPT | Performed by: FAMILY MEDICINE

## 2021-04-05 PROCEDURE — 91301 COVID-19, MODERNA VACCINE 100MCG/0.5ML DOSE: CPT | Performed by: FAMILY MEDICINE

## 2021-05-13 ENCOUNTER — PATIENT MESSAGE (OUTPATIENT)
Dept: INTERNAL MEDICINE CLINIC | Age: 66
End: 2021-05-13

## 2021-05-13 DIAGNOSIS — R19.7 DIARRHEA, UNSPECIFIED TYPE: Primary | ICD-10-CM

## 2021-05-13 DIAGNOSIS — R19.7 DIARRHEA, UNSPECIFIED TYPE: ICD-10-CM

## 2021-05-13 LAB — C DIFF TOXIN/ANTIGEN: NORMAL

## 2021-05-13 NOTE — TELEPHONE ENCOUNTER
From: Timmy Berger IV  To: Maynor Walsh MD  Sent: 5/13/2021 9:55 AM EDT  Subject: Non-Urgent Medical Question    I have had diarrhea for 3 days. Also a mild fever for the first day, around 99.1. No blood in diarrhea and no other symptoms besides stomach cramping. I've been taking Imodium since yesterday, but that hasn't stopped the diarrhea. Do I need to make an appointment to see Dr. Flash Garcia (an e-visit would be fine too), or do I need an antibiotic? Also note that my cardiologist says I can't take Cipro anymore. Thank you!

## 2021-05-17 RX ORDER — ESOMEPRAZOLE MAGNESIUM 40 MG/1
CAPSULE, DELAYED RELEASE ORAL
Qty: 90 CAPSULE | Refills: 1 | Status: SHIPPED | OUTPATIENT
Start: 2021-05-17 | End: 2021-12-06

## 2021-06-15 DIAGNOSIS — Z01.89 ROUTINE LAB DRAW: ICD-10-CM

## 2021-06-15 LAB
A/G RATIO: 2.4 (ref 1.1–2.2)
ALBUMIN SERPL-MCNC: 4.5 G/DL (ref 3.4–5)
ALP BLD-CCNC: 45 U/L (ref 40–129)
ALT SERPL-CCNC: 22 U/L (ref 10–40)
ANION GAP SERPL CALCULATED.3IONS-SCNC: 16 MMOL/L (ref 3–16)
AST SERPL-CCNC: 21 U/L (ref 15–37)
BASOPHILS ABSOLUTE: 0 K/UL (ref 0–0.2)
BASOPHILS RELATIVE PERCENT: 0.7 %
BILIRUB SERPL-MCNC: 0.7 MG/DL (ref 0–1)
BUN BLDV-MCNC: 19 MG/DL (ref 7–20)
CALCIUM SERPL-MCNC: 9.4 MG/DL (ref 8.3–10.6)
CHLORIDE BLD-SCNC: 104 MMOL/L (ref 99–110)
CHOLESTEROL, TOTAL: 171 MG/DL (ref 0–199)
CO2: 23 MMOL/L (ref 21–32)
CREAT SERPL-MCNC: 1 MG/DL (ref 0.8–1.3)
EOSINOPHILS ABSOLUTE: 0.1 K/UL (ref 0–0.6)
EOSINOPHILS RELATIVE PERCENT: 2.4 %
GFR AFRICAN AMERICAN: >60
GFR NON-AFRICAN AMERICAN: >60
GLOBULIN: 1.9 G/DL
GLUCOSE BLD-MCNC: 106 MG/DL (ref 70–99)
HCT VFR BLD CALC: 45.2 % (ref 40.5–52.5)
HDLC SERPL-MCNC: 37 MG/DL (ref 40–60)
HEMOGLOBIN: 15.8 G/DL (ref 13.5–17.5)
LDL CHOLESTEROL CALCULATED: 94 MG/DL
LYMPHOCYTES ABSOLUTE: 2 K/UL (ref 1–5.1)
LYMPHOCYTES RELATIVE PERCENT: 32.5 %
MCH RBC QN AUTO: 33.4 PG (ref 26–34)
MCHC RBC AUTO-ENTMCNC: 34.9 G/DL (ref 31–36)
MCV RBC AUTO: 95.5 FL (ref 80–100)
MONOCYTES ABSOLUTE: 0.5 K/UL (ref 0–1.3)
MONOCYTES RELATIVE PERCENT: 8.7 %
NEUTROPHILS ABSOLUTE: 3.4 K/UL (ref 1.7–7.7)
NEUTROPHILS RELATIVE PERCENT: 55.7 %
PDW BLD-RTO: 13.8 % (ref 12.4–15.4)
PLATELET # BLD: 170 K/UL (ref 135–450)
PMV BLD AUTO: 9.3 FL (ref 5–10.5)
POTASSIUM SERPL-SCNC: 4 MMOL/L (ref 3.5–5.1)
RBC # BLD: 4.73 M/UL (ref 4.2–5.9)
SODIUM BLD-SCNC: 143 MMOL/L (ref 136–145)
TOTAL PROTEIN: 6.4 G/DL (ref 6.4–8.2)
TRIGL SERPL-MCNC: 198 MG/DL (ref 0–150)
TSH SERPL DL<=0.05 MIU/L-ACNC: 1.37 UIU/ML (ref 0.27–4.2)
VLDLC SERPL CALC-MCNC: 40 MG/DL
WBC # BLD: 6.1 K/UL (ref 4–11)

## 2021-06-16 LAB
ESTIMATED AVERAGE GLUCOSE: 111.2 MG/DL
HBA1C MFR BLD: 5.5 %

## 2021-06-22 ENCOUNTER — HOSPITAL ENCOUNTER (OUTPATIENT)
Age: 66
Discharge: HOME | End: 2021-06-22
Payer: COMMERCIAL

## 2021-06-22 ENCOUNTER — OFFICE VISIT (OUTPATIENT)
Dept: INTERNAL MEDICINE CLINIC | Age: 66
End: 2021-06-22
Payer: COMMERCIAL

## 2021-06-22 ENCOUNTER — HOSPITAL ENCOUNTER (OUTPATIENT)
Dept: GENERAL RADIOLOGY | Age: 66
Discharge: HOME | End: 2021-06-22
Payer: COMMERCIAL

## 2021-06-22 VITALS
BODY MASS INDEX: 40.43 KG/M2 | WEIGHT: 315 LBS | RESPIRATION RATE: 14 BRPM | DIASTOLIC BLOOD PRESSURE: 80 MMHG | HEIGHT: 74 IN | SYSTOLIC BLOOD PRESSURE: 118 MMHG | HEART RATE: 68 BPM

## 2021-06-22 DIAGNOSIS — G47.30 SLEEP APNEA, UNSPECIFIED TYPE: ICD-10-CM

## 2021-06-22 DIAGNOSIS — M25.532 WRIST PAIN, ACUTE, LEFT: ICD-10-CM

## 2021-06-22 DIAGNOSIS — I10 ESSENTIAL HYPERTENSION: Primary | ICD-10-CM

## 2021-06-22 DIAGNOSIS — I35.0 NONRHEUMATIC AORTIC VALVE STENOSIS: ICD-10-CM

## 2021-06-22 DIAGNOSIS — K21.00 GASTROESOPHAGEAL REFLUX DISEASE WITH ESOPHAGITIS, UNSPECIFIED WHETHER HEMORRHAGE: ICD-10-CM

## 2021-06-22 DIAGNOSIS — R00.2 PALPITATIONS: ICD-10-CM

## 2021-06-22 DIAGNOSIS — R73.9 HYPERGLYCEMIA: ICD-10-CM

## 2021-06-22 DIAGNOSIS — I71.21 ASCENDING AORTIC ANEURYSM: ICD-10-CM

## 2021-06-22 DIAGNOSIS — E66.01 MORBID OBESITY WITH BMI OF 50.0-59.9, ADULT (HCC): ICD-10-CM

## 2021-06-22 DIAGNOSIS — I35.1 NONRHEUMATIC AORTIC VALVE INSUFFICIENCY: ICD-10-CM

## 2021-06-22 DIAGNOSIS — E78.2 MIXED HYPERLIPIDEMIA: ICD-10-CM

## 2021-06-22 PROBLEM — R91.1 LEFT LOWER LOBE PULMONARY NODULE: Status: RESOLVED | Noted: 2020-08-21 | Resolved: 2021-06-22

## 2021-06-22 PROCEDURE — G8427 DOCREV CUR MEDS BY ELIG CLIN: HCPCS | Performed by: INTERNAL MEDICINE

## 2021-06-22 PROCEDURE — 99214 OFFICE O/P EST MOD 30 MIN: CPT | Performed by: INTERNAL MEDICINE

## 2021-06-22 PROCEDURE — G8417 CALC BMI ABV UP PARAM F/U: HCPCS | Performed by: INTERNAL MEDICINE

## 2021-06-22 PROCEDURE — 3017F COLORECTAL CA SCREEN DOC REV: CPT | Performed by: INTERNAL MEDICINE

## 2021-06-22 PROCEDURE — 1036F TOBACCO NON-USER: CPT | Performed by: INTERNAL MEDICINE

## 2021-06-22 PROCEDURE — 73110 X-RAY EXAM OF WRIST: CPT

## 2021-06-22 RX ORDER — TRIAMCINOLONE ACETONIDE 1 MG/G
CREAM TOPICAL
COMMUNITY
Start: 2021-05-03

## 2021-06-22 SDOH — ECONOMIC STABILITY: FOOD INSECURITY: WITHIN THE PAST 12 MONTHS, YOU WORRIED THAT YOUR FOOD WOULD RUN OUT BEFORE YOU GOT MONEY TO BUY MORE.: NEVER TRUE

## 2021-06-22 SDOH — ECONOMIC STABILITY: FOOD INSECURITY: WITHIN THE PAST 12 MONTHS, THE FOOD YOU BOUGHT JUST DIDN'T LAST AND YOU DIDN'T HAVE MONEY TO GET MORE.: NEVER TRUE

## 2021-06-22 ASSESSMENT — PATIENT HEALTH QUESTIONNAIRE - PHQ9
SUM OF ALL RESPONSES TO PHQ QUESTIONS 1-9: 0
SUM OF ALL RESPONSES TO PHQ9 QUESTIONS 1 & 2: 0
SUM OF ALL RESPONSES TO PHQ QUESTIONS 1-9: 0
1. LITTLE INTEREST OR PLEASURE IN DOING THINGS: 0
2. FEELING DOWN, DEPRESSED OR HOPELESS: 0
SUM OF ALL RESPONSES TO PHQ QUESTIONS 1-9: 0

## 2021-06-22 ASSESSMENT — ENCOUNTER SYMPTOMS
SHORTNESS OF BREATH: 0
APNEA: 1
ALLERGIC/IMMUNOLOGIC NEGATIVE: 1
EYES NEGATIVE: 1
ROS SKIN COMMENTS: NEEDS TO SEE DERM

## 2021-06-22 ASSESSMENT — SOCIAL DETERMINANTS OF HEALTH (SDOH): HOW HARD IS IT FOR YOU TO PAY FOR THE VERY BASICS LIKE FOOD, HOUSING, MEDICAL CARE, AND HEATING?: NOT HARD AT ALL

## 2021-06-22 NOTE — ASSESSMENT & PLAN NOTE
Well-controlled, continue current medications and lifestyle modifications recommended needs to check BP at home

## 2021-06-22 NOTE — ASSESSMENT & PLAN NOTE
Well-controlled, continue current medications and lifestyle modifications recommended needs to lose weight

## 2021-06-22 NOTE — PROGRESS NOTES
Subjective:      Patient ID: Laurie Champagne is a 61 y.o. male. HPI  Here today for follow up of chronic problems as per HPI and as problems listed under assessment and plan,no new c/o feels good  Recent fall and injury to L wrist     Hypertension  This is a chronic problem. The current episode started more than 1 year ago. The problem is unchanged. The problem is controlled. Associated symptoms include chest pain (see hosp records ) and palpitations. Pertinent negatives include no shortness of breath. There are no associated agents to hypertension. Risk factors for coronary artery disease include dyslipidemia, male gender and obesity. Past treatments include ACE inhibitors, beta blockers and lifestyle changes. The current treatment provides significant improvement. There are no compliance problems. Hyperlipidemia  This is a chronic problem. The current episode started more than 1 year ago. The problem is controlled. Recent lipid tests were reviewed and are normal. There are no known factors aggravating his hyperlipidemia. Associated symptoms include chest pain (see hosp records ). Pertinent negatives include no shortness of breath. Current antihyperlipidemic treatment includes diet change, exercise, fibric acid derivatives and statins. The current treatment provides significant improvement of lipids. There are no compliance problems. Risk factors for coronary artery disease include dyslipidemia, hypertension, male sex and obesity. Allergies   Allergen Reactions    Chocolate     Ciprofloxacin      ?  Cardiac side effects     Dairy Digestive [Lactase]     Other      dairy    Penicillins        Current Outpatient Medications   Medication Sig Dispense Refill    triamcinolone (KENALOG) 0.1 % cream APPLY TO RASH TWICE DAILY UP TO 2 WEEKS PER MONTH AS NEEDED      esomeprazole (NEXIUM) 40 MG delayed release capsule TAKE ONE CAPSULE BY MOUTH EVERY MORNING BEFORE BREAKFAST 90 capsule 1    Nexium now 40 BID -repeat 2021  Colonoscopy 2016 polyps repeat 2020    Endocrine: Negative. Genitourinary: Positive for frequency and urgency. Nocturia   Musculoskeletal: Positive for gait problem (s/p bilat TKR ambulating improved ). Skin:        Needs to see derm     Allergic/Immunologic: Negative. Hematological: Negative. Psychiatric/Behavioral: Negative. Objective:   Physical Exam:  Physical Exam  Constitutional:       Appearance: He is well-developed. He is obese. HENT:      Head: Normocephalic and atraumatic. Right Ear: External ear normal.      Left Ear: External ear normal.   Eyes:      Conjunctiva/sclera: Conjunctivae normal.      Pupils: Pupils are equal, round, and reactive to light. Neck:      Thyroid: No thyromegaly. Trachea: No tracheal deviation. Cardiovascular:      Rate and Rhythm: Normal rate and regular rhythm. Pulses: Normal pulses. Heart sounds: Murmur (3/^ AS ) heard. Pulmonary:      Effort: Pulmonary effort is normal.      Breath sounds: Normal breath sounds. Abdominal:      General: Abdomen is flat. Bowel sounds are normal.      Palpations: Abdomen is soft. Comments: obese   Genitourinary:     Rectum: Guaiac result negative. Musculoskeletal:         General: Tenderness: knees  improved. Normal range of motion. Cervical back: Normal range of motion and neck supple. Skin:     General: Skin is warm and dry. Neurological:      General: No focal deficit present. Mental Status: He is alert and oriented to person, place, and time. Mental status is at baseline. Deep Tendon Reflexes: Reflexes are normal and symmetric. Psychiatric:         Mood and Affect: Mood normal.         Behavior: Behavior normal.         Thought Content:  Thought content normal.         /80 (Site: Right Upper Arm, Position: Sitting, Cuff Size: Medium Adult)   Pulse 68   Resp 14   Ht 6' 2\" (1.88 m)   Wt (!) 372 lb 6.4 oz (168.9 kg) BMI 47.81 kg/m²       Assessment & Plan:         Sleep apnea   Well-controlled, lifestyle modifications recommended cont to use C-pap    Reflux esophagitis   Well-controlled, continue current medications and lifestyle modifications recommended    Palpitations   No recent symptoms     Morbid obesity with BMI of 50.0-59.9, adult (HCC)   Uncontrolled, lifestyle modifications recommended needs tp cont to diet and exercise     Mixed hyperlipidemia  Improved with change in meds and diet     Hyperglycemia   Diet controled  Last labs ok     Essential hypertension   Well-controlled, continue current medications and lifestyle modifications recommended needs to check BP at home     Ascending aortic aneurysm (HCC)   Well-controlled, continue current medications and lifestyle modifications recommended needs to lose weight    Nonrheumatic aortic valve insufficiency   Monitored by specialist- no acute findings meriting change in the plan    Nonrheumatic aortic valve stenosis   Monitored by specialist- no acute findings meriting change in the plan    Wrist pain, acute, left  Send for xray wear wrist brace prn

## 2021-08-18 RX ORDER — ROSUVASTATIN CALCIUM 20 MG/1
TABLET, COATED ORAL
Qty: 30 TABLET | Refills: 3 | Status: SHIPPED | OUTPATIENT
Start: 2021-08-18 | End: 2022-01-07

## 2021-09-22 ENCOUNTER — OFFICE VISIT (OUTPATIENT)
Dept: INTERNAL MEDICINE CLINIC | Age: 66
End: 2021-09-22
Payer: COMMERCIAL

## 2021-09-22 VITALS
HEIGHT: 74 IN | HEART RATE: 68 BPM | WEIGHT: 315 LBS | DIASTOLIC BLOOD PRESSURE: 74 MMHG | BODY MASS INDEX: 40.43 KG/M2 | RESPIRATION RATE: 14 BRPM | SYSTOLIC BLOOD PRESSURE: 132 MMHG

## 2021-09-22 DIAGNOSIS — I35.0 NONRHEUMATIC AORTIC VALVE STENOSIS: ICD-10-CM

## 2021-09-22 DIAGNOSIS — Z00.00 WELL ADULT EXAM: ICD-10-CM

## 2021-09-22 DIAGNOSIS — R73.9 HYPERGLYCEMIA: ICD-10-CM

## 2021-09-22 DIAGNOSIS — I35.1 NONRHEUMATIC AORTIC VALVE INSUFFICIENCY: ICD-10-CM

## 2021-09-22 DIAGNOSIS — G47.30 SLEEP APNEA, UNSPECIFIED TYPE: ICD-10-CM

## 2021-09-22 DIAGNOSIS — N40.0 BENIGN PROSTATIC HYPERPLASIA WITHOUT LOWER URINARY TRACT SYMPTOMS: ICD-10-CM

## 2021-09-22 DIAGNOSIS — E66.01 MORBID OBESITY WITH BMI OF 50.0-59.9, ADULT (HCC): ICD-10-CM

## 2021-09-22 DIAGNOSIS — K21.00 GASTROESOPHAGEAL REFLUX DISEASE WITH ESOPHAGITIS, UNSPECIFIED WHETHER HEMORRHAGE: ICD-10-CM

## 2021-09-22 DIAGNOSIS — U07.1 COVID-19: ICD-10-CM

## 2021-09-22 DIAGNOSIS — I10 ESSENTIAL HYPERTENSION: ICD-10-CM

## 2021-09-22 DIAGNOSIS — E78.2 MIXED HYPERLIPIDEMIA: ICD-10-CM

## 2021-09-22 DIAGNOSIS — R00.2 PALPITATIONS: ICD-10-CM

## 2021-09-22 DIAGNOSIS — I71.21 ASCENDING AORTIC ANEURYSM: ICD-10-CM

## 2021-09-22 PROCEDURE — 99396 PREV VISIT EST AGE 40-64: CPT | Performed by: INTERNAL MEDICINE

## 2021-09-22 PROCEDURE — 90674 CCIIV4 VAC NO PRSV 0.5 ML IM: CPT | Performed by: INTERNAL MEDICINE

## 2021-09-22 PROCEDURE — 90471 IMMUNIZATION ADMIN: CPT | Performed by: INTERNAL MEDICINE

## 2021-09-22 ASSESSMENT — ENCOUNTER SYMPTOMS
APNEA: 1
ALLERGIC/IMMUNOLOGIC NEGATIVE: 1
ROS SKIN COMMENTS: NEEDS TO SEE DERM
EYES NEGATIVE: 1
SHORTNESS OF BREATH: 0

## 2021-09-22 NOTE — ASSESSMENT & PLAN NOTE
Well-controlled, continue current medications and lifestyle modifications recommended to check BP at home

## 2021-09-22 NOTE — PROGRESS NOTES
Subjective:      Patient ID: Wing Manrique is a 61 y.o. male. HPI  Here today for complete physical and review of chronic problems as listed under assessment and plan,no new c/o feels good     Hypertension  This is a chronic problem. The current episode started more than 1 year ago. The problem is unchanged. The problem is controlled. Associated symptoms include chest pain (see hosp records ) and palpitations. Pertinent negatives include no shortness of breath. There are no associated agents to hypertension. Risk factors for coronary artery disease include dyslipidemia, male gender and obesity. Past treatments include ACE inhibitors, beta blockers and lifestyle changes. The current treatment provides significant improvement. There are no compliance problems. Hyperlipidemia  This is a chronic problem. The current episode started more than 1 year ago. The problem is controlled. Recent lipid tests were reviewed and are normal. There are no known factors aggravating his hyperlipidemia. Associated symptoms include chest pain (see hosp records ). Pertinent negatives include no shortness of breath. Current antihyperlipidemic treatment includes diet change, exercise, fibric acid derivatives and statins. The current treatment provides significant improvement of lipids. There are no compliance problems. Risk factors for coronary artery disease include dyslipidemia, hypertension, male sex and obesity. Allergies   Allergen Reactions    Chocolate     Ciprofloxacin      ?  Cardiac side effects     Dairy Digestive [Lactase]     Other      dairy    Penicillins        Current Outpatient Medications   Medication Sig Dispense Refill    rosuvastatin (CRESTOR) 20 MG tablet TAKE 1 TABLET BY MOUTH EVERY NIGHT 30 tablet 3    triamcinolone (KENALOG) 0.1 % cream APPLY TO RASH TWICE DAILY UP TO 2 WEEKS PER MONTH AS NEEDED      esomeprazole (NEXIUM) 40 MG delayed release capsule TAKE ONE CAPSULE BY MOUTH EVERY MORNING BEFORE BREAKFAST 90 capsule 1    furosemide (LASIX) 40 MG tablet Take 40 mg by mouth daily as needed      potassium chloride (KLOR-CON) 10 MEQ extended release tablet Take 10 mEq by mouth daily      fenofibrate (TRICOR) 145 MG tablet TAKE 1 TABLET BY MOUTH ONCE DAILY 120 tablet 1    metoprolol succinate (TOPROL XL) 25 MG extended release tablet TAKE 1 TABLET BY MOUTH DAILY 90 tablet 2    lisinopril (PRINIVIL;ZESTRIL) 40 MG tablet Take 1 tablet by mouth daily 90 tablet 3    aspirin 81 MG tablet Take 81 mg by mouth daily      Multiple Vitamins-Minerals (MULTIVITAMIN ADULTS 50+ PO) Take by mouth daily      CIALIS 20 MG tablet TAKE AS DIRECTED 6 tablet 0    Cholecalciferol (VITAMIN D) 1000 UNIT CAPS Take 2,000 Units by mouth daily       clindamycin (CLEOCIN) 300 MG capsule Take 300 mg by mouth 3 times daily Before dental procedure (Patient not taking: Reported on 9/22/2021)       No current facility-administered medications for this visit.        Past Medical History:   Diagnosis Date    Apnea     Hyperlipidemia     Kidney stone on right side 1/2016    passed ok     Syncope 10/2004    Unspecified sleep apnea     using C-PAP       Family History   Problem Relation Age of Onset    Cancer Mother         ovarian    Other Father         Pulmonary Fibrosis       Past Surgical History:   Procedure Laterality Date    COLONOSCOPY  01/26/2004    POLYP    COLONOSCOPY  12/13/2016    polyps Dr. Garnet Scheuermann repeat in 2020    ENDOSCOPY, COLON, [address], [address]    [place]    JOINT REPLACEMENT      left knee    JOINT REPLACEMENT Left 11/19/13    L TKR Dr Marilin Bill Right 11/2014    R TKR Dr Lazarus [facility]  03/23/2018    Dr. Alice Rodriguez repeat in 2021       Social History     Socioeconomic History    Marital status:      Spouse name: Not on file    Number of children: Not on file    Years of education: Not on file    Highest education level: Not on file   Occupational History    Not on file   Tobacco Use    Smoking status: Never Smoker    Smokeless tobacco: Never Used   Substance and Sexual Activity    Alcohol use: Yes     Comment: occ    Drug use: No    Sexual activity: Not on file   Other Topics Concern    Not on file   Social History Narrative    ** Merged History Encounter **          Social Determinants of Health     Financial Resource Strain: Low Risk     Difficulty of Paying Living Expenses: Not hard at all   Food Insecurity: No Food Insecurity    Worried About [address] in the Last Year: Never true    [address] [facility] [address] in the Last Year: Never true   Transportation Needs:     Lack of Transportation (Medical):  Lack of Transportation (Non-Medical):    Physical Activity:     Days of Exercise per Week:     Minutes of Exercise per Session:    Stress:     Feeling of Stress :    Social Connections:     Frequency of Communication with Friends and Family:     Frequency of Social Gatherings with Friends and Family:     Attends Orthodoxy Services:     Active Member of Clubs or Organizations:     Attends Club or Organization Meetings:     Marital Status:    Intimate Partner Violence:     Fear of Current or Ex-Partner:     Emotionally Abused:     Physically Abused:     Sexually Abused:        Review of Systems  Review of Systems   Constitutional: Negative. Negative for chills, diaphoresis and unexpected weight change (up a few # ). HENT: Negative. Eyes: Negative. Respiratory: Positive for apnea (improved using C-pap ). Negative for shortness of breath. Cardiovascular: Positive for chest pain (see hosp records ) and palpitations. Negative for leg swelling. Gastrointestinal:        Kevon Herbert sx  EGD 12/13/16 ? [facility] tEGD 3/18  Nexium now 40 BID -repeat 2021  Colonoscopy 2016 polyps repeat 2020    Endocrine: Negative. Genitourinary: Positive for frequency and urgency. 49.17 kg/m²       Assessment & Plan:         Nonrheumatic aortic valve stenosis  Cont F/U with cardiology as noted no new symptoms      Nonrheumatic aortic valve insufficiency   Monitored by specialist- no acute findings meriting change in the plan    Sleep apnea   Well-controlled, lifestyle modifications recommended cont to use C-pap    Reflux esophagitis   Well-controlled, continue current medications and lifestyle modifications recommended    Palpitations   Well-controlled, continue current medications and lifestyle modifications recommended    Morbid obesity with BMI of 50.0-59.9, adult ([facility]   Needs to cont to try to diet and exercise     Mixed hyperlipidemia   Well-controlled, continue current medications and lifestyle modifications recommended    Hyperglycemia   Well-controlled, lifestyle modifications recommended with diet needs to cont to lose weight     Essential hypertension   Well-controlled, continue current medications and lifestyle modifications recommended to check BP at home     COVID-19   No residue s/p vaccination     BPH (benign prostatic hyperplasia)   occc symptoms as noted     Ascending aortic aneurysm ([facility]   Monitored by specialist- no acute findings meriting change in the plan no new symptoms     Well adult exam  Within normal limits for 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

## 2021-09-22 NOTE — ASSESSMENT & PLAN NOTE
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

## 2021-10-22 PROBLEM — Z00.00 WELL ADULT EXAM: Status: RESOLVED | Noted: 2017-01-17 | Resolved: 2021-10-22

## 2021-12-06 RX ORDER — ESOMEPRAZOLE MAGNESIUM 40 MG/1
CAPSULE, DELAYED RELEASE ORAL
Qty: 90 CAPSULE | Refills: 1 | Status: SHIPPED | OUTPATIENT
Start: 2021-12-06 | End: 2022-02-07 | Stop reason: SDUPTHER

## 2022-01-05 ENCOUNTER — PATIENT MESSAGE (OUTPATIENT)
Dept: INTERNAL MEDICINE CLINIC | Age: 67
End: 2022-01-05

## 2022-01-07 RX ORDER — ROSUVASTATIN CALCIUM 20 MG/1
TABLET, COATED ORAL
Qty: 30 TABLET | Refills: 3 | Status: SHIPPED | OUTPATIENT
Start: 2022-01-07 | End: 2022-01-07 | Stop reason: SDUPTHER

## 2022-01-07 RX ORDER — ROSUVASTATIN CALCIUM 20 MG/1
TABLET, COATED ORAL
Qty: 90 TABLET | Refills: 1 | Status: SHIPPED | OUTPATIENT
Start: 2022-01-07 | End: 2022-08-08 | Stop reason: SDUPTHER

## 2022-02-07 ENCOUNTER — OFFICE VISIT (OUTPATIENT)
Dept: INTERNAL MEDICINE CLINIC | Age: 67
End: 2022-02-07
Payer: COMMERCIAL

## 2022-02-07 VITALS
RESPIRATION RATE: 14 BRPM | BODY MASS INDEX: 40.43 KG/M2 | WEIGHT: 315 LBS | OXYGEN SATURATION: 97 % | HEIGHT: 74 IN | DIASTOLIC BLOOD PRESSURE: 84 MMHG | SYSTOLIC BLOOD PRESSURE: 136 MMHG | HEART RATE: 74 BPM

## 2022-02-07 DIAGNOSIS — G47.30 SLEEP APNEA, UNSPECIFIED TYPE: ICD-10-CM

## 2022-02-07 DIAGNOSIS — E78.2 MIXED HYPERLIPIDEMIA: ICD-10-CM

## 2022-02-07 DIAGNOSIS — I71.21 ASCENDING AORTIC ANEURYSM: ICD-10-CM

## 2022-02-07 DIAGNOSIS — E66.01 MORBID OBESITY WITH BMI OF 50.0-59.9, ADULT (HCC): ICD-10-CM

## 2022-02-07 DIAGNOSIS — I35.0 NONRHEUMATIC AORTIC VALVE STENOSIS: ICD-10-CM

## 2022-02-07 DIAGNOSIS — R73.9 HYPERGLYCEMIA: ICD-10-CM

## 2022-02-07 DIAGNOSIS — N40.0 BENIGN PROSTATIC HYPERPLASIA WITHOUT LOWER URINARY TRACT SYMPTOMS: ICD-10-CM

## 2022-02-07 DIAGNOSIS — I10 ESSENTIAL HYPERTENSION: Primary | ICD-10-CM

## 2022-02-07 DIAGNOSIS — K21.00 GASTROESOPHAGEAL REFLUX DISEASE WITH ESOPHAGITIS, UNSPECIFIED WHETHER HEMORRHAGE: ICD-10-CM

## 2022-02-07 DIAGNOSIS — I35.1 NONRHEUMATIC AORTIC VALVE INSUFFICIENCY: ICD-10-CM

## 2022-02-07 DIAGNOSIS — R00.2 PALPITATIONS: ICD-10-CM

## 2022-02-07 PROCEDURE — G8417 CALC BMI ABV UP PARAM F/U: HCPCS | Performed by: INTERNAL MEDICINE

## 2022-02-07 PROCEDURE — 99214 OFFICE O/P EST MOD 30 MIN: CPT | Performed by: INTERNAL MEDICINE

## 2022-02-07 PROCEDURE — 1036F TOBACCO NON-USER: CPT | Performed by: INTERNAL MEDICINE

## 2022-02-07 PROCEDURE — 3017F COLORECTAL CA SCREEN DOC REV: CPT | Performed by: INTERNAL MEDICINE

## 2022-02-07 PROCEDURE — G8427 DOCREV CUR MEDS BY ELIG CLIN: HCPCS | Performed by: INTERNAL MEDICINE

## 2022-02-07 PROCEDURE — G8482 FLU IMMUNIZE ORDER/ADMIN: HCPCS | Performed by: INTERNAL MEDICINE

## 2022-02-07 RX ORDER — LISINOPRIL 20 MG/1
20 TABLET ORAL DAILY
COMMUNITY

## 2022-02-07 RX ORDER — ASPIRIN 325 MG
325 TABLET ORAL DAILY
COMMUNITY
End: 2022-08-08 | Stop reason: DRUGHIGH

## 2022-02-07 RX ORDER — FENOFIBRATE 160 MG/1
160 TABLET ORAL DAILY
COMMUNITY
End: 2022-08-08 | Stop reason: SDUPTHER

## 2022-02-07 RX ORDER — ESOMEPRAZOLE MAGNESIUM 40 MG/1
CAPSULE, DELAYED RELEASE ORAL
Qty: 90 CAPSULE | Refills: 1 | Status: SHIPPED | OUTPATIENT
Start: 2022-02-07

## 2022-02-07 ASSESSMENT — PATIENT HEALTH QUESTIONNAIRE - PHQ9
SUM OF ALL RESPONSES TO PHQ QUESTIONS 1-9: 0
2. FEELING DOWN, DEPRESSED OR HOPELESS: 0
SUM OF ALL RESPONSES TO PHQ QUESTIONS 1-9: 0
1. LITTLE INTEREST OR PLEASURE IN DOING THINGS: 0
SUM OF ALL RESPONSES TO PHQ9 QUESTIONS 1 & 2: 0

## 2022-02-07 ASSESSMENT — ENCOUNTER SYMPTOMS
APNEA: 1
ROS SKIN COMMENTS: NEEDS TO SEE DERM
ALLERGIC/IMMUNOLOGIC NEGATIVE: 1
SHORTNESS OF BREATH: 0
EYES NEGATIVE: 1

## 2022-02-07 NOTE — PROGRESS NOTES
Subjective:      Patient ID: Melly Solis is a 61 y.o. male. HPI  Here today for follow up of chronic problems as per HPI and as problems listed under assessment and plan,no new c/o feels good s/p Aortic valve and   Aortic A and also s/p pacemaker at [address]  11//2021 at [address] see records     Hypertension  This is a chronic problem. The current episode started more than 1 year ago. The problem is unchanged. The problem is controlled. Associated symptoms include chest pain (see hosp records  improved s/p surgery ) and palpitations (improved ). Pertinent negatives include no shortness of breath. There are no associated agents to hypertension. Risk factors for coronary artery disease include dyslipidemia, male gender and obesity. Past treatments include ACE inhibitors, beta blockers and lifestyle changes. The current treatment provides significant improvement. There are no compliance problems. Hyperlipidemia  This is a chronic problem. The current episode started more than 1 year ago. The problem is controlled. Recent lipid tests were reviewed and are normal. There are no known factors aggravating his hyperlipidemia. Associated symptoms include chest pain (see hosp records  improved s/p surgery ). Pertinent negatives include no shortness of breath. Current antihyperlipidemic treatment includes diet change, exercise, fibric acid derivatives and statins. The current treatment provides significant improvement of lipids. There are no compliance problems. Risk factors for coronary artery disease include dyslipidemia, hypertension, male sex and obesity. Allergies   Allergen Reactions    Chocolate Other (See Comments)     Cold like symptoms     Ciprofloxacin      ?  Cardiac side effects     Dairy Digestive [Lactase]     Other      dairy    Penicillins     Food Other (See Comments)     coldlike symptoms       Current Outpatient Medications   Medication Sig Dispense Refill    aspirin 325 MG tablet Take 325 mg by mouth daily      fenofibrate (TRIGLIDE) 160 MG tablet Take 160 mg by mouth daily      lisinopril (PRINIVIL;ZESTRIL) 20 MG tablet Take 20 mg by mouth daily      metoprolol tartrate (LOPRESSOR) 25 MG tablet Take 25 mg by mouth 2 times daily      esomeprazole (NEXIUM) 40 MG delayed release capsule TAKE ONE CAPSULE BY MOUTH EVERY MORNING BEFORE BREAKFAST 90 capsule 1    rosuvastatin (CRESTOR) 20 MG tablet TAKE 1 TABLET BY MOUTH EVERY NIGHT 90 tablet 1    triamcinolone (KENALOG) 0.1 % cream APPLY TO RASH TWICE DAILY UP TO 2 WEEKS PER MONTH AS NEEDED      potassium chloride (KLOR-CON) 10 MEQ extended release tablet Take 10 mEq by mouth daily      fenofibrate (TRICOR) 145 MG tablet TAKE 1 TABLET BY MOUTH ONCE DAILY 120 tablet 1    clindamycin (CLEOCIN) 300 MG capsule Take 300 mg by mouth 3 times daily Before dental procedure       Multiple Vitamins-Minerals (MULTIVITAMIN ADULTS 50+ PO) Take by mouth daily      CIALIS 20 MG tablet TAKE AS DIRECTED 6 tablet 0    Cholecalciferol (VITAMIN D) 1000 UNIT CAPS Take 2,000 Units by mouth daily        No current facility-administered medications for this visit.        Past Medical History:   Diagnosis Date    Apnea     Hyperlipidemia     Kidney stone on right side 1/2016    passed ok     Syncope 10/2004    Unspecified sleep apnea     using C-PAP       Family History   Problem Relation Age of Onset    Cancer Mother         ovarian    Other Father         Pulmonary Fibrosis       Past Surgical History:   Procedure Laterality Date    COLONOSCOPY  01/26/2004    POLYP    COLONOSCOPY  12/13/2016    polyps Dr. Mathew Flores repeat in 2020    ENDOSCOPY, COLON, [address]      left knee    JOINT REPLACEMENT Left 11/19/13    L TKR Dr Blas Ndiaye Right 11/2014    R TKR Dr Keren Gilliam  03/23/2018    Dr. Brijesh Shin repeat in 2021       Social History     Socioeconomic History    Marital status:      Spouse name: Not on file    Number of children: Not on file    Years of education: Not on file    Highest education level: Not on file   Occupational History    Not on file   Tobacco Use    Smoking status: Never Smoker    Smokeless tobacco: Never Used   Substance and Sexual Activity    Alcohol use: Yes     Comment: occ    Drug use: No    Sexual activity: Not on file   Other Topics Concern    Not on file   Social History Narrative    ** Merged History Encounter **          Social Determinants of Health     Financial Resource Strain: Low Risk     Difficulty of Paying Living Expenses: Not hard at all   Food Insecurity: No Food Insecurity    Worried About Running Out of Food in the Last Year: Never true    [place] of Food in the Last Year: Never true   Transportation Needs:     Lack of Transportation (Medical): Not on file    Lack of Transportation (Non-Medical): Not on file   Physical Activity:     Days of Exercise per Week: Not on file    Minutes of Exercise per Session: Not on file   Stress:     Feeling of Stress : Not on file   Social Connections:     Frequency of Communication with Friends and Family: Not on file    Frequency of Social Gatherings with Friends and Family: Not on file    Attends Spiritism Services: Not on file    Active Member of [address] [place] [address] [employer] or Organizations: Not on file    Attends Club or Organization Meetings: Not on file    Marital Status: Not on file   Intimate Partner Violence:     Fear of Current or Ex-Partner: Not on file    Emotionally Abused: Not on file    Physically Abused: Not on file    Sexually Abused: Not on file   Housing Stability:     Unable to Pay for Housing in the Last Year: Not on file    Number of [place] in the Last Year: Not on file    Unstable Housing in the Last Year: Not on file       Review of Systems  Review of Systems   Constitutional: Negative.   Negative for chills, diaphoresis and unexpected weight change (down 6#). HENT: Negative. Eyes: Negative. Respiratory: Positive for apnea (improved using C-pap ). Negative for shortness of breath. Cardiovascular: Positive for chest pain (see hosp records  improved s/p surgery ) and palpitations (improved ). Negative for leg swelling. Gastrointestinal:        Pravin Gray sx  EGD 12/13/16 ? [facility] tEGD 3/18  Nexium now 40 BID -repeat 2021  Colonoscopy 2016 polyps repeat 2020  Still needs repeat to be done soon    Endocrine: Negative. Genitourinary: Positive for frequency and urgency. Nocturia   Musculoskeletal: Positive for gait problem (s/p bilat TKR ambulating improved ). Skin:        Needs to see derm     Allergic/Immunologic: Negative. Hematological: Negative. Psychiatric/Behavioral: Negative. Objective:   Physical Exam:  Physical Exam  Constitutional:       Appearance: He is well-developed. He is obese. HENT:      Head: Normocephalic and atraumatic. Right Ear: Tympanic membrane, ear canal and external ear normal.      Left Ear: Tympanic membrane, ear canal and external ear normal.   Eyes:      Conjunctiva/sclera: Conjunctivae normal.      Pupils: Pupils are equal, round, and reactive to light. Neck:      Thyroid: No thyromegaly. Trachea: No tracheal deviation. Cardiovascular:      Rate and Rhythm: Normal rate and regular rhythm. Pulses: Normal pulses. Heart sounds: Murmur (S/P Aortic Valve replacement ) heard. Pulmonary:      Effort: Pulmonary effort is normal.      Breath sounds: Normal breath sounds. Abdominal:      General: Abdomen is flat. Bowel sounds are normal.      Palpations: Abdomen is soft. Comments: obese   Genitourinary:     Rectum: Guaiac result negative. Musculoskeletal:         General: Tenderness: knees  improved. Normal range of motion. Cervical back: Normal range of motion and neck supple.    Skin:     General: Skin is warm and dry. Neurological:      General: No focal deficit present. Mental Status: He is alert and oriented to person, place, and time. Mental status is at baseline. Deep Tendon Reflexes: Reflexes are normal and symmetric. Psychiatric:         Mood and Affect: Mood normal.         Behavior: Behavior normal.         Thought Content:  Thought content normal.         /84 (Site: Right Upper Arm, Position: Sitting, Cuff Size: Medium Adult)   Pulse 74   Resp 14   Ht 6' 2\" (1.88 m)   Wt (!) 377 lb 3.2 oz (171.1 kg)   SpO2 97%   BMI 48.43 kg/m²       Assessment & Plan:         Nonrheumatic aortic valve stenosis   Monitored by specialist- no acute findings meriting change in the plan meds changed as noted     Nonrheumatic aortic valve insufficiency   Monitored by specialist- no acute findings meriting change in the plan    Sleep apnea   Well-controlled, lifestyle modifications recommended cont with C-pap     Reflux esophagitis   Well-controlled, continue current medications and lifestyle modifications recommended diet and meds as noted     Palpitations   Improved s/p pacemakler and meds as noted     Morbid obesity with BMI of 50.0-59.9, adult (HCC)   Cont to diet and cardiac rehab as noted     Mixed hyperlipidemia   Well-controlled, continue current medications and lifestyle modifications recommended labs pending     Hyperglycemia   Well-controlled, lifestyle modifications recommended diet controled check labs with CPE    Essential hypertension   Borderline controlled, continue current medications and lifestyle modifications recommendedup recently meds changed to check BP at home     BPH (benign prostatic hyperplasia)   Well-controlled, lifestyle modifications recommended no meds     Ascending aortic aneurysm ([address]   Monitored by specialist- no acute findings meriting change in the plan

## 2022-02-07 NOTE — ASSESSMENT & PLAN NOTE
Borderline controlled, continue current medications and lifestyle modifications recommendedup recently meds changed to check BP at home

## 2022-02-07 NOTE — ASSESSMENT & PLAN NOTE
Well-controlled, continue current medications and lifestyle modifications recommended diet and meds as noted

## 2022-08-01 ENCOUNTER — TELEPHONE (OUTPATIENT)
Dept: ORTHOPEDIC SURGERY | Age: 67
End: 2022-08-01

## 2022-08-01 NOTE — TELEPHONE ENCOUNTER
Submitted PA for Esomeprazole Magnesium 40MG dr capsules, via Phone Call to [employer], Spoke to [employer]. Status: DENIED - Asha stated that the preferred alternatives are Omeprazole and Pantoprazole (patient has taken this medication and told Asha that). Has patient ever taken Omeprazole? If he has, I'll do an appeal.  Please respond to the pool ( [id number]). Thank you!

## 2022-08-03 DIAGNOSIS — I71.21 ASCENDING AORTIC ANEURYSM: ICD-10-CM

## 2022-08-03 DIAGNOSIS — R73.9 HYPERGLYCEMIA: ICD-10-CM

## 2022-08-03 DIAGNOSIS — I35.1 NONRHEUMATIC AORTIC VALVE INSUFFICIENCY: ICD-10-CM

## 2022-08-03 DIAGNOSIS — N40.0 BENIGN PROSTATIC HYPERPLASIA WITHOUT LOWER URINARY TRACT SYMPTOMS: ICD-10-CM

## 2022-08-03 DIAGNOSIS — I35.0 NONRHEUMATIC AORTIC VALVE STENOSIS: ICD-10-CM

## 2022-08-03 DIAGNOSIS — R00.2 PALPITATIONS: ICD-10-CM

## 2022-08-03 DIAGNOSIS — G47.30 SLEEP APNEA, UNSPECIFIED TYPE: ICD-10-CM

## 2022-08-03 DIAGNOSIS — I10 ESSENTIAL HYPERTENSION: ICD-10-CM

## 2022-08-03 DIAGNOSIS — K21.00 GASTROESOPHAGEAL REFLUX DISEASE WITH ESOPHAGITIS, UNSPECIFIED WHETHER HEMORRHAGE: ICD-10-CM

## 2022-08-03 DIAGNOSIS — E78.2 MIXED HYPERLIPIDEMIA: ICD-10-CM

## 2022-08-03 DIAGNOSIS — E66.01 MORBID OBESITY WITH BMI OF 50.0-59.9, ADULT (HCC): ICD-10-CM

## 2022-08-03 LAB
A/G RATIO: 2 (ref 1.1–2.2)
ALBUMIN SERPL-MCNC: 4.4 G/DL (ref 3.4–5)
ALP BLD-CCNC: 61 U/L (ref 40–129)
ALT SERPL-CCNC: 23 U/L (ref 10–40)
ANION GAP SERPL CALCULATED.3IONS-SCNC: 15 MMOL/L (ref 3–16)
AST SERPL-CCNC: 24 U/L (ref 15–37)
BACTERIA: NORMAL /HPF
BASOPHILS ABSOLUTE: 0 K/UL (ref 0–0.2)
BASOPHILS RELATIVE PERCENT: 0.6 %
BILIRUB SERPL-MCNC: 0.7 MG/DL (ref 0–1)
BILIRUBIN URINE: NEGATIVE
BLOOD, URINE: NEGATIVE
BUN BLDV-MCNC: 17 MG/DL (ref 7–20)
CALCIUM SERPL-MCNC: 9.6 MG/DL (ref 8.3–10.6)
CHLORIDE BLD-SCNC: 105 MMOL/L (ref 99–110)
CHOLESTEROL, TOTAL: 186 MG/DL (ref 0–199)
CLARITY: CLEAR
CO2: 22 MMOL/L (ref 21–32)
COLOR: YELLOW
CREAT SERPL-MCNC: 1 MG/DL (ref 0.8–1.3)
EOSINOPHILS ABSOLUTE: 0.1 K/UL (ref 0–0.6)
EOSINOPHILS RELATIVE PERCENT: 1.5 %
EPITHELIAL CELLS, UA: 0 /HPF (ref 0–5)
GFR AFRICAN AMERICAN: >60
GFR NON-AFRICAN AMERICAN: >60
GLUCOSE BLD-MCNC: 122 MG/DL (ref 70–99)
GLUCOSE URINE: NEGATIVE MG/DL
HCT VFR BLD CALC: 49 % (ref 40.5–52.5)
HDLC SERPL-MCNC: 41 MG/DL (ref 40–60)
HEMOGLOBIN: 16.9 G/DL (ref 13.5–17.5)
HYALINE CASTS: 0 /LPF (ref 0–8)
KETONES, URINE: NEGATIVE MG/DL
LDL CHOLESTEROL CALCULATED: 91 MG/DL
LEUKOCYTE ESTERASE, URINE: NEGATIVE
LYMPHOCYTES ABSOLUTE: 3 K/UL (ref 1–5.1)
LYMPHOCYTES RELATIVE PERCENT: 45.5 %
MCH RBC QN AUTO: 32.6 PG (ref 26–34)
MCHC RBC AUTO-ENTMCNC: 34.5 G/DL (ref 31–36)
MCV RBC AUTO: 94.6 FL (ref 80–100)
MICROSCOPIC EXAMINATION: YES
MONOCYTES ABSOLUTE: 0.5 K/UL (ref 0–1.3)
MONOCYTES RELATIVE PERCENT: 7.9 %
NEUTROPHILS ABSOLUTE: 3 K/UL (ref 1.7–7.7)
NEUTROPHILS RELATIVE PERCENT: 44.5 %
NITRITE, URINE: NEGATIVE
PDW BLD-RTO: 14 % (ref 12.4–15.4)
PH UA: 5.5 (ref 5–8)
PLATELET # BLD: 161 K/UL (ref 135–450)
PMV BLD AUTO: 8.9 FL (ref 5–10.5)
POTASSIUM SERPL-SCNC: 4.3 MMOL/L (ref 3.5–5.1)
PROSTATE SPECIFIC ANTIGEN: 2.77 NG/ML (ref 0–4)
PROTEIN UA: ABNORMAL MG/DL
RBC # BLD: 5.18 M/UL (ref 4.2–5.9)
RBC UA: 1 /HPF (ref 0–4)
SODIUM BLD-SCNC: 142 MMOL/L (ref 136–145)
SPECIFIC GRAVITY UA: 1.02 (ref 1–1.03)
TOTAL PROTEIN: 6.6 G/DL (ref 6.4–8.2)
TRIGL SERPL-MCNC: 269 MG/DL (ref 0–150)
TSH REFLEX: 1.6 UIU/ML (ref 0.27–4.2)
URINE REFLEX TO CULTURE: ABNORMAL
URINE TYPE: ABNORMAL
UROBILINOGEN, URINE: 0.2 E.U./DL
VLDLC SERPL CALC-MCNC: 54 MG/DL
WBC # BLD: 6.7 K/UL (ref 4–11)
WBC UA: 1 /HPF (ref 0–5)

## 2022-08-04 LAB
ESTIMATED AVERAGE GLUCOSE: 116.9 MG/DL
HBA1C MFR BLD: 5.7 %

## 2022-08-08 ENCOUNTER — OFFICE VISIT (OUTPATIENT)
Dept: INTERNAL MEDICINE CLINIC | Age: 67
End: 2022-08-08
Payer: MEDICARE

## 2022-08-08 VITALS
BODY MASS INDEX: 40.43 KG/M2 | TEMPERATURE: 97.9 F | OXYGEN SATURATION: 96 % | HEIGHT: 74 IN | WEIGHT: 315 LBS | DIASTOLIC BLOOD PRESSURE: 78 MMHG | SYSTOLIC BLOOD PRESSURE: 138 MMHG | HEART RATE: 73 BPM | RESPIRATION RATE: 14 BRPM

## 2022-08-08 DIAGNOSIS — G47.30 SLEEP APNEA, UNSPECIFIED TYPE: ICD-10-CM

## 2022-08-08 DIAGNOSIS — M25.562 ARTHRALGIA OF LEFT LOWER LEG: ICD-10-CM

## 2022-08-08 DIAGNOSIS — I35.0 NONRHEUMATIC AORTIC VALVE STENOSIS: ICD-10-CM

## 2022-08-08 DIAGNOSIS — Z00.00 WELL ADULT EXAM: Primary | ICD-10-CM

## 2022-08-08 DIAGNOSIS — E78.2 MIXED HYPERLIPIDEMIA: ICD-10-CM

## 2022-08-08 DIAGNOSIS — Z00.00 WELCOME TO MEDICARE PREVENTIVE VISIT: ICD-10-CM

## 2022-08-08 DIAGNOSIS — I10 ESSENTIAL HYPERTENSION: ICD-10-CM

## 2022-08-08 DIAGNOSIS — I35.1 NONRHEUMATIC AORTIC VALVE INSUFFICIENCY: ICD-10-CM

## 2022-08-08 DIAGNOSIS — R00.2 PALPITATIONS: ICD-10-CM

## 2022-08-08 DIAGNOSIS — K21.00 GASTROESOPHAGEAL REFLUX DISEASE WITH ESOPHAGITIS, UNSPECIFIED WHETHER HEMORRHAGE: ICD-10-CM

## 2022-08-08 PROCEDURE — 1123F ACP DISCUSS/DSCN MKR DOCD: CPT | Performed by: INTERNAL MEDICINE

## 2022-08-08 PROCEDURE — 99214 OFFICE O/P EST MOD 30 MIN: CPT | Performed by: INTERNAL MEDICINE

## 2022-08-08 PROCEDURE — G8427 DOCREV CUR MEDS BY ELIG CLIN: HCPCS | Performed by: INTERNAL MEDICINE

## 2022-08-08 PROCEDURE — G0402 INITIAL PREVENTIVE EXAM: HCPCS | Performed by: INTERNAL MEDICINE

## 2022-08-08 PROCEDURE — G8417 CALC BMI ABV UP PARAM F/U: HCPCS | Performed by: INTERNAL MEDICINE

## 2022-08-08 PROCEDURE — 1036F TOBACCO NON-USER: CPT | Performed by: INTERNAL MEDICINE

## 2022-08-08 PROCEDURE — 3017F COLORECTAL CA SCREEN DOC REV: CPT | Performed by: INTERNAL MEDICINE

## 2022-08-08 RX ORDER — ROSUVASTATIN CALCIUM 20 MG/1
TABLET, COATED ORAL
Qty: 90 TABLET | Refills: 1 | Status: SHIPPED | OUTPATIENT
Start: 2022-08-08 | End: 2022-11-02

## 2022-08-08 RX ORDER — FENOFIBRATE 160 MG/1
160 TABLET ORAL
COMMUNITY

## 2022-08-08 RX ORDER — ASPIRIN 81 MG/1
81 TABLET ORAL
COMMUNITY

## 2022-08-08 RX ORDER — FUROSEMIDE 40 MG/1
40 TABLET ORAL PRN
COMMUNITY

## 2022-08-08 SDOH — ECONOMIC STABILITY: FOOD INSECURITY: WITHIN THE PAST 12 MONTHS, THE FOOD YOU BOUGHT JUST DIDN'T LAST AND YOU DIDN'T HAVE MONEY TO GET MORE.: NEVER TRUE

## 2022-08-08 SDOH — ECONOMIC STABILITY: FOOD INSECURITY: WITHIN THE PAST 12 MONTHS, YOU WORRIED THAT YOUR FOOD WOULD RUN OUT BEFORE YOU GOT MONEY TO BUY MORE.: NEVER TRUE

## 2022-08-08 ASSESSMENT — PATIENT HEALTH QUESTIONNAIRE - PHQ9
SUM OF ALL RESPONSES TO PHQ QUESTIONS 1-9: 0
2. FEELING DOWN, DEPRESSED OR HOPELESS: 0
SUM OF ALL RESPONSES TO PHQ9 QUESTIONS 1 & 2: 0
1. LITTLE INTEREST OR PLEASURE IN DOING THINGS: 0
SUM OF ALL RESPONSES TO PHQ QUESTIONS 1-9: 0

## 2022-08-08 ASSESSMENT — LIFESTYLE VARIABLES
HOW OFTEN DURING THE LAST YEAR HAVE YOU BEEN UNABLE TO REMEMBER WHAT HAPPENED THE NIGHT BEFORE BECAUSE YOU HAD BEEN DRINKING: 0
HAVE YOU OR SOMEONE ELSE BEEN INJURED AS A RESULT OF YOUR DRINKING: 0
HAS A RELATIVE, FRIEND, DOCTOR, OR ANOTHER HEALTH PROFESSIONAL EXPRESSED CONCERN ABOUT YOUR DRINKING OR SUGGESTED YOU CUT DOWN: 0
HOW OFTEN DURING THE LAST YEAR HAVE YOU NEEDED AN ALCOHOLIC DRINK FIRST THING IN THE MORNING TO GET YOURSELF GOING AFTER A NIGHT OF HEAVY DRINKING: 0
HOW OFTEN DURING THE LAST YEAR HAVE YOU FOUND THAT YOU WERE NOT ABLE TO STOP DRINKING ONCE YOU HAD STARTED: 0
HOW OFTEN DURING THE LAST YEAR HAVE YOU HAD A FEELING OF GUILT OR REMORSE AFTER DRINKING: 0
HOW OFTEN DO YOU HAVE A DRINK CONTAINING ALCOHOL: 4 OR MORE TIMES A WEEK
HOW MANY STANDARD DRINKS CONTAINING ALCOHOL DO YOU HAVE ON A TYPICAL DAY: 1 OR 2
HOW OFTEN DURING THE LAST YEAR HAVE YOU FAILED TO DO WHAT WAS NORMALLY EXPECTED FROM YOU BECAUSE OF DRINKING: 0

## 2022-08-08 ASSESSMENT — ENCOUNTER SYMPTOMS
ALLERGIC/IMMUNOLOGIC NEGATIVE: 1
SHORTNESS OF BREATH: 0
EYES NEGATIVE: 1
ROS SKIN COMMENTS: NEEDS TO SEE DERM
APNEA: 1

## 2022-08-08 ASSESSMENT — SOCIAL DETERMINANTS OF HEALTH (SDOH): HOW HARD IS IT FOR YOU TO PAY FOR THE VERY BASICS LIKE FOOD, HOUSING, MEDICAL CARE, AND HEATING?: NOT HARD AT ALL

## 2022-08-08 NOTE — ASSESSMENT & PLAN NOTE
Well-controlled, continue current medications and lifestyle modifications recommended to cont to check BP at home

## 2022-08-08 NOTE — PROGRESS NOTES
Subjective:      Patient ID: Marry Lisa is a 74 y.o. male. HPI  Here today for welcome to medicare complete physical and review of chronic problems as listed under assessment and plan,no new c/o feels good       Hypertension  This is a chronic problem. The current episode started more than 1 year ago. The problem is unchanged. The problem is controlled. Associated symptoms include palpitations. Pertinent negatives include no shortness of breath. There are no associated agents to hypertension. Risk factors for coronary artery disease include dyslipidemia, male gender and obesity. Past treatments include ACE inhibitors, beta blockers and lifestyle changes. The current treatment provides significant improvement. There are no compliance problems. Hyperlipidemia  This is a chronic problem. The current episode started more than 1 year ago. The problem is controlled. Recent lipid tests were reviewed and are normal. There are no known factors aggravating his hyperlipidemia. Pertinent negatives include no shortness of breath. Current antihyperlipidemic treatment includes diet change, exercise, fibric acid derivatives and statins. The current treatment provides significant improvement of lipids. There are no compliance problems. Risk factors for coronary artery disease include dyslipidemia, hypertension, male sex and obesity. Allergies   Allergen Reactions    Chocolate Other (See Comments)     Cold like symptoms     Ciprofloxacin      ?  Cardiac side effects     Dairy Digestive [Lactase]     Other      dairy    Penicillins     Food Other (See Comments)     coldlike symptoms       Current Outpatient Medications   Medication Sig Dispense Refill    aspirin 81 MG EC tablet Take 81 mg by mouth Take one tablet once a day      fenofibrate (TRIGLIDE) 160 MG tablet 160 mg Take one tablet once a day      furosemide (LASIX) 40 MG tablet 40 mg as needed (edema)      metoprolol tartrate (LOPRESSOR) 25 MG tablet Take 1 tablet by mouth in the morning and 1 tablet before bedtime. 180 tablet 1    rosuvastatin (CRESTOR) 20 MG tablet TAKE 1 TABLET BY MOUTH EVERY NIGHT 90 tablet 1    lisinopril (PRINIVIL;ZESTRIL) 20 MG tablet Take 20 mg by mouth daily      esomeprazole (NEXIUM) 40 MG delayed release capsule TAKE ONE CAPSULE BY MOUTH EVERY MORNING BEFORE BREAKFAST 90 capsule 1    triamcinolone (KENALOG) 0.1 % cream APPLY TO RASH TWICE DAILY UP TO 2 WEEKS PER MONTH AS NEEDED      potassium chloride (KLOR-CON) 10 MEQ extended release tablet Take 10 mEq by mouth daily as needed (edema)      clindamycin (CLEOCIN) 300 MG capsule Take 300 mg by mouth 3 times daily Before dental procedure       Multiple Vitamins-Minerals (MULTIVITAMIN ADULTS 50+ PO) Take by mouth daily      Cholecalciferol (VITAMIN D) 1000 UNIT CAPS Take 2,000 Units by mouth daily        No current facility-administered medications for this visit.        Past Medical History:   Diagnosis Date    Apnea     Hyperlipidemia     Kidney stone on right side 1/2016    passed ok     Syncope 10/2004    Unspecified sleep apnea     using C-PAP       Family History   Problem Relation Age of Onset    Cancer Mother         ovarian    Other Father         Pulmonary Fibrosis       Past Surgical History:   Procedure Laterality Date    COLONOSCOPY  01/26/2004    POLYP    COLONOSCOPY  12/13/2016    polyps Dr. Vishal Rodriguez repeat in 2020    ENDOSCOPY, COLON, [address], [address]      left knee    JOINT REPLACEMENT Left 11/19/13    L TKR Dr Eleazar Lemus Right 11/2014    R TKR Dr Isidro Gonzalez  03/23/2018    Dr. Alize Nova repeat in 2021       Social History     Socioeconomic History    Marital status:      Spouse name: Not on file    Number of children: Not on file    Years of education: Not on file    Highest education level: Not on file   Occupational History    Not on file Tobacco Use    Smoking status: Never    Smokeless tobacco: Never   Substance and Sexual Activity    Alcohol use: Yes     Comment: occ    Drug use: No    Sexual activity: Not on file   Other Topics Concern    Not on file   Social History Narrative    ** Merged History Encounter **          Social Determinants of Health     Financial Resource Strain: Low Risk     Difficulty of Paying Living Expenses: Not hard at all   Food Insecurity: No Food Insecurity    Worried About Running Out of Food in the Last Year: Never true    Ran Out of Food in the Last Year: Never true   Transportation Needs: Not on file   Physical Activity: Sufficiently Active    Days of Exercise per Week: 5 days    Minutes of Exercise per Session: 120 min   Stress: Not on file   Social Connections: Not on file   Intimate Partner Violence: Not on file   Housing Stability: Not on file       Review of Systems  Review of Systems   Constitutional: Negative. Negative for chills, diaphoresis and unexpected weight change (up a few #). HENT: Negative. Eyes: Negative. Respiratory:  Positive for apnea (improved using C-pap ). Negative for shortness of breath. Cardiovascular:  Positive for palpitations. Negative for leg swelling. Gastrointestinal:         Jory Lucas sx  EGD 12/13/16 ? [place] tEGD 3/18  Nexium now 40 BID -repeat 2021  Colonoscopy 2016 polyps repeat 2020  still needs to be done    Endocrine: Negative. Genitourinary:  Positive for frequency and urgency. Nocturia   Musculoskeletal:  Positive for gait problem (s/p bilat TKR ambulating improved ). Skin:         Needs to see derm     Allergic/Immunologic: Negative. Hematological: Negative. Psychiatric/Behavioral: Negative. Objective:   Physical Exam:  Physical Exam  Constitutional:       Appearance: He is well-developed. He is obese. HENT:      Head: Normocephalic and atraumatic.       Right Ear: Tympanic membrane, ear canal and external ear normal.      Left Ear: Tympanic membrane, ear canal and external ear normal.   Eyes:      Conjunctiva/sclera: Conjunctivae normal.      Pupils: Pupils are equal, round, and reactive to light. Neck:      Thyroid: No thyromegaly. Trachea: No tracheal deviation. Cardiovascular:      Rate and Rhythm: Normal rate and regular rhythm. Pulses: Normal pulses. Heart sounds: Murmur (S/P AV bovine valve and aortic anyrisum repair as noted 11/21) heard. Pulmonary:      Effort: Pulmonary effort is normal.      Breath sounds: Normal breath sounds. Abdominal:      General: Abdomen is flat. Bowel sounds are normal.      Palpations: Abdomen is soft. Comments: obese   Genitourinary:     Penis: Normal.       Rectum: Normal. Guaiac result negative. Comments: Slight increase in prostate size no tenderness no change   Musculoskeletal:         General: Tenderness: knees  improved. Normal range of motion. Cervical back: Normal range of motion and neck supple. Skin:     General: Skin is warm and dry. Neurological:      General: No focal deficit present. Mental Status: He is alert and oriented to person, place, and time. Mental status is at baseline. Deep Tendon Reflexes: Reflexes are normal and symmetric. Psychiatric:         Mood and Affect: Mood normal.         Behavior: Behavior normal.         Thought Content:  Thought content normal.       /78 (Site: Right Upper Arm, Position: Sitting, Cuff Size: Medium Adult)   Pulse 73   Temp 97.9 °F (36.6 °C) (Temporal)   Resp 14   Ht 6' 2\" (1.88 m)   Wt (!) 386 lb 9.6 oz (175.4 kg)   SpO2 96%   BMI 49.64 kg/m²       Assessment & Plan:     Nonrheumatic aortic valve stenosis   Monitored by specialist- no acute findings meriting change in the plan cont with Cardiology as noted     Nonrheumatic aortic valve insufficiency   Monitored by specialist- no acute findings meriting change in the plan    Knee pain, left   S/P surgery stable for now     Essential hypertension   Well-controlled, continue current medications and lifestyle modifications recommended to cont to check BP at home     Sleep apnea    cont to use C-pap     Palpitations   well controled with current meds     Reflux esophagitis   Well-controlled, continue current medications and lifestyle modifications recommended    Mixed hyperlipidemia   Well-controlled, continue current medications and lifestyle modifications recommended    Well adult exam   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 Cardiac side effects     Dairy Digestive [Lactase]     Other      dairy    Penicillins     Food Other (See Comments)     coldlike symptoms     Current Outpatient Medications   Medication Sig Dispense Refill    aspirin 81 MG EC tablet Take 81 mg by mouth Take one tablet once a day      fenofibrate (TRIGLIDE) 160 MG tablet 160 mg Take one tablet once a day      furosemide (LASIX) 40 MG tablet 40 mg as needed (edema)      metoprolol tartrate (LOPRESSOR) 25 MG tablet Take 1 tablet by mouth in the morning and 1 tablet before bedtime.  180 tablet 1    rosuvastatin (CRESTOR) 20 MG tablet TAKE 1 TABLET BY MOUTH EVERY NIGHT 90 tablet 1    lisinopril (PRINIVIL;ZESTRIL) 20 MG tablet Take 20 mg by mouth daily      esomeprazole (NEXIUM) 40 MG delayed release capsule TAKE ONE CAPSULE BY MOUTH EVERY MORNING BEFORE BREAKFAST 90 capsule 1    triamcinolone (KENALOG) 0.1 % cream APPLY TO RASH TWICE DAILY UP TO 2 WEEKS PER MONTH AS NEEDED      potassium chloride (KLOR-CON) 10 MEQ extended release tablet Take 10 mEq by mouth daily as needed (edema)      clindamycin (CLEOCIN) 300 MG capsule Take 300 mg by mouth 3 times daily Before dental procedure       Multiple Vitamins-Minerals (MULTIVITAMIN ADULTS 50+ PO) Take by mouth daily      Cholecalciferol (VITAMIN D) 1000 UNIT CAPS Take 2,000 Units by mouth daily        No current facility-administered medications for this visit. Vitals:    08/08/22 1323   BP: 138/78   Site: Right Upper Arm   Position: Sitting   Cuff Size: Medium Adult   Pulse: 73   Resp: 14   Temp: 97.9 °F (36.6 °C)   TempSrc: Temporal   SpO2: 96%   Weight: (!) 386 lb 9.6 oz (175.4 kg)   Height: 6' 2\" (1.88 m)     Body mass index is 49.64 kg/m². Wt Readings from Last 3 Encounters:   08/08/22 (!) 386 lb 9.6 oz (175.4 kg)   02/07/22 (!) 377 lb 3.2 oz (171.1 kg)   09/22/21 (!) 383 lb (173.7 kg)     BP Readings from Last 3 Encounters:   08/08/22 138/78   02/07/22 136/84   09/22/21 132/74       Consultants:   Patient Care Team:  Jocelin Chambres MD as PCP - General (Internal Medicine)  Jocelin Chambers MD as PCP - [facility]  Norbert Manuel (Inactive) as Consulting Physician (Sleep Medicine Family Practice)  Bertha Cameron MD as Consulting Physician (Dermatology)  Jorge Silva MD as Consulting Physician (Otolaryngology)  Maria Elena Mcclure MD as Cardiologist (Interventional Cardiology)  Lonna Boas, MD as Consulting Physician (Cardiothoracic Surgery)  Scooby Perry MD as Consulting Physician (Cardiology)  Federico Pearl MD as Consulting Physician (Internal Medicine)    Chief Complaint:   Jared Galvan IV is a 74 y.o. male who presents for Medicare Preventive Physical Examination with Personalized Prevention Plan Services.     HPI: Tr review listed chronic problems     Patient Active Problem List   Diagnosis    Knee pain, left    Benign neoplasm of skin    Other seborrheic keratosis    Essential hypertension    Sleep apnea    Palpitations    Reflux esophagitis    Mixed hyperlipidemia    BPH (benign prostatic hyperplasia)    Morbid obesity with BMI of 50.0-59.9, adult (HCC)    Arthritis of right knee    Well adult exam    Nonrheumatic aortic valve stenosis    Nonrheumatic aortic valve insufficiency    Hyperglycemia    Ascending aortic aneurysm (HCC)    COVID-19    Wrist pain, acute, left       Mood Disorders Screen:  Risk factors: none  Symptoms:  endorses none, denies depressed mood, difficulty concentrating, difficulty sleeping, reduced interest in activities, and fatigue     Safety Assessment:  Functional ability/ADLs:  Difficulty with bathing- no, grooming- no, meals- no, incontinence- no.  Driving- yes. Home safety: Lives with family wife. Number of stairs to enter home: 2, within home: 12 between floors. Risk factors for falls: obesity.   Home environment modifications:  yes - rails in BR etc.     End of Life Planning:  Advanced Directive: NA.      Preventive Care:  Self-testicular exams: Yes  Last PSA: 2.77, normal  Last colonoscopy: 2018 polyp needs repeat now, abnormal  AAA screening: yes S/P repair  Last eye exam: 2021, normal  Exercise: yes  Seatbelt use: yes      Immunization History   Administered Date(s) Administered    COVID-19, MODERNA BLUE border, Primary or Immunocompromised, (age 12y+), IM, 100 mcg/0.5mL 03/08/2021, 04/05/2021, 01/05/2022    Hepatitis A/Hepatitis B (Twinrix) 04/30/2019, 05/28/2019, 12/11/2019    Influenza 10/18/2010, 11/08/2011    Influenza Vaccine, unspecified formulation 10/14/2016    Influenza Virus Vaccine 12/11/2015, 10/14/2016    Influenza Whole 12/11/2015    Influenza, High Dose (Fluzone 65 yrs and older) 11/12/2014    Influenza, Intradermal, Preservative free 11/30/2012, 10/21/2013    Influenza, Intradermal, Quadrivalent, Preservative Free 10/14/2016, 10/25/2017    Influenza, MDCK Quadv, IM, PF (Flucelvax 2 yrs and older) 09/22/2021    Influenza, Quadv, IM, PF (6 mo and older Fluzone, Flulaval, Fluarix, and 3 yrs and older Afluria) 10/08/2018, 11/01/2019, 11/23/2020    Td, unspecified formulation 12/02/2003    Tdap (Boostrix, Adacel) 06/12/2015       Past Medical History:   Diagnosis Date    Apnea     Hyperlipidemia     Kidney stone on right side 1/2016    passed ok     Syncope 10/2004    Unspecified sleep apnea     using C-PAP     Past Surgical History:   Procedure Laterality Date    COLONOSCOPY  01/26/2004    POLYP    COLONOSCOPY  12/13/2016    polyps Dr. Marcel Vasquez repeat in 2020    ENDOSCOPY, COLON, [address] [place] [address],[address]      left knee    JOINT REPLACEMENT Left 11/19/13    L TKR Dr Arun Reyes Right 11/2014    R TKR Dr Evi Harrington  03/23/2018    Dr. Justin Velez repeat in 2021     Family History   Problem Relation Age of Onset    Cancer Mother         ovarian    Other Father         Pulmonary Fibrosis     Social History     Socioeconomic History    Marital status:      Spouse name: Not on file    Number of children: Not on file    Years of education: Not on file    Highest education level: Not on file   Occupational History    Not on file   Tobacco Use    Smoking status: Never    Smokeless tobacco: Never   Substance and Sexual Activity    Alcohol use: Yes     Comment: occ    Drug use: No    Sexual activity: Not on file   Other Topics Concern    Not on file   Social History Narrative    ** Merged History Encounter **          Social Determinants of Health     Financial Resource Strain: Low Risk     Difficulty of Paying Living Expenses: Not hard at all   Food Insecurity: No Food Insecurity    Worried About Running Out of Food in the Last Year: Never true    [address] in the Last Year: Never true   Transportation Needs: Not on file   Physical Activity: Sufficiently Active    Days of Exercise per Week: 5 days    Minutes of Exercise per Session: 120 min   Stress: Not on file   Social Connections: Not on file   Intimate Partner Violence: Not on file   Housing Stability: Not on file     }        Visual Acuity: Corrected:            L  20/20            R 20/20    Cognitive Screening:  Clock drawing test score: 5/5. Mini-mental status exam score: NA. Assessment/Plan:  Jeremias Bourne was seen today for medicare awv and discuss labs. Diagnoses and all orders for this visit:    Nonrheumatic aortic valve stenosis    Nonrheumatic aortic valve insufficiency    Arthralgia of left lower leg    Essential hypertension    Sleep apnea, unspecified type    Palpitations    Gastroesophageal reflux disease with esophagitis, unspecified whether hemorrhage    Mixed hyperlipidemia    Well adult exam    Other orders  -     metoprolol tartrate (LOPRESSOR) 25 MG tablet; Take 1 tablet by mouth in the morning and 1 tablet before bedtime.   -     rosuvastatin (CRESTOR) 20 MG tablet; TAKE 1 TABLET BY MOUTH EVERY NIGHT        Medicare Annual Wellness Visit    Effie Kendall IV is here for Medicare AWV and Discuss Labs    Assessment & Plan   Nonrheumatic aortic valve stenosis  Assessment & Plan:   Monitored by specialist- no acute findings meriting change in the plan cont with Cardiology as noted   Nonrheumatic aortic valve insufficiency  Assessment & Plan:   Monitored by specialist- no acute findings meriting change in the plan  Arthralgia of left lower leg  Assessment & Plan:   S/P surgery stable for now   Essential hypertension  Assessment & Plan:   Well-controlled, continue current medications and lifestyle modifications recommended to cont to check BP at home   Sleep apnea, unspecified type  Assessment & Plan:    cont to use C-pap   Palpitations  Assessment & Plan:   well controled with current meds   Gastroesophageal reflux disease with esophagitis, unspecified whether hemorrhage  Assessment & Plan:   Well-controlled, continue current medications and lifestyle modifications recommended  Mixed hyperlipidemia  Assessment & Plan:   Well-controlled, continue current medications and lifestyle modifications recommended  Well adult exam  Assessment & Plan: 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  Recommended screening schedule for the next 5-10 years is provided to the patient in written form: see Patient Instructions/AVS.     No follow-ups on file.      Subjective   The following acute and/or chronic problems were also addressed today:  Nonrheumatic aortic valve stenosis   Monitored by specialist- no acute findings meriting change in the plan cont with Cardiology as noted     Nonrheumatic aortic valve insufficiency   Monitored by specialist- no acute findings meriting change in the plan    Knee pain, left   S/P surgery stable for now     Essential hypertension   Well-controlled, continue current medications and lifestyle modifications recommended to cont to check BP at home     Sleep apnea    cont to use C-pap     Palpitations   well controled with current meds     Reflux esophagitis   Well-controlled, continue current medications and lifestyle modifications recommended    Mixed hyperlipidemia   Well-controlled, continue current medications and lifestyle modifications recommended    Well adult exam   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 The following problems were reviewed today and where indicated follow up appointments were made and/or referrals ordered. Positive Risk Factor Screenings with Interventions:        Alcohol Screening:  Alcohol Use: Heavy Drinker    Frequency of Alcohol Consumption: 4 or more times a week    Average Number of Drinks: 1 or 2    Frequency of Binge Drinking: Less than monthly     AUDIT-C Score: 5  AUDIT Total Score: 5  An AUDIT-C score of 3-7 indicates potential alcohol risk. An AUDIT Total score of 8 or more is associated with harmful or hazardous drinking. A score of 13 or more in women, and 15 or more in men, is likely to indicate alcohol dependence.   Substance Use - Alcohol Interventions:  NA        General Health and ACP:  General  In general, how would you say your health is?: Good  In the past 7 days, have you experienced any of the following: New or Increased Pain, New or Increased Fatigue, Loneliness, Social Isolation, Stress or Anger?: No  Do you get the social and emotional support that you need?: Yes  Do you have a Living Will?: Yes    Advance Directives       Power of  Living Will ACP-Advance Directive ACP-Power of     Not on File Not on File Not on File Not on File        General Health Risk Interventions:  NA    Health Habits/Nutrition:  Physical Activity: Sufficiently Active    Days of Exercise per Week: 5 days    Minutes of Exercise per Session: 120 min     Have you lost any weight without trying in the past 3 months?: No  Body mass index: (!) 49.63  Have you seen the dentist within the past year?: Yes  Health Habits/Nutrition Interventions:  NA             Objective   Vitals:    08/08/22 1323   BP: 138/78   Site: Right Upper Arm   Position: Sitting   Cuff Size: Medium Adult   Pulse: 73   Resp: 14   Temp: 97.9 °F (36.6 °C)   TempSrc: Temporal   SpO2: 96%   Weight: (!) 386 lb 9.6 oz (175.4 kg)   Height: 6' 2\" (1.88 m)      Body mass index is 49.64 kg/m². Allergies   Allergen Reactions    Chocolate Other (See Comments)     Cold like symptoms     Ciprofloxacin      ? Cardiac side effects     Dairy Digestive [Lactase]     Other      dairy    Penicillins     Food Other (See Comments)     coldlike symptoms     Prior to Visit Medications    Medication Sig Taking? Authorizing Provider   aspirin 81 MG EC tablet Take 81 mg by mouth Take one tablet once a day Yes Historical Provider, MD   fenofibrate (TRIGLIDE) 160 MG tablet 160 mg Take one tablet once a day Yes Historical Provider, MD   furosemide (LASIX) 40 MG tablet 40 mg as needed (edema) Yes Historical Provider, MD   metoprolol tartrate (LOPRESSOR) 25 MG tablet Take 1 tablet by mouth in the morning and 1 tablet before bedtime.  Yes Barbara Tejada MD   rosuvastatin (CRESTOR) 20 MG tablet TAKE 1 TABLET BY MOUTH EVERY NIGHT Yes Barbara Tejada MD   lisinopril (PRINIVIL;ZESTRIL) 20 MG tablet Take 20 mg by mouth daily Yes Historical Provider, MD   esomeprazole (NEXIUM) 40 MG delayed release capsule TAKE ONE CAPSULE BY MOUTH EVERY MORNING BEFORE BREAKFAST Yes Barbara Tejada MD   triamcinolone (KENALOG) 0.1 % cream APPLY TO RASH TWICE DAILY UP TO 2 WEEKS PER MONTH AS NEEDED Yes Historical Provider, MD   potassium chloride (KLOR-CON) 10 MEQ extended release tablet Take 10 mEq by mouth daily as needed (edema) Yes Historical Provider, MD   clindamycin (CLEOCIN) 300 MG capsule Take 300 mg by mouth 3 times daily Before dental procedure  Yes Historical Provider, MD   Multiple Vitamins-Minerals (MULTIVITAMIN ADULTS 50+ PO) Take by mouth daily Yes Historical Provider, MD   Cholecalciferol (VITAMIN D) 1000 UNIT CAPS Take 2,000 Units by mouth daily  Yes Historical Provider, [place] (Including outside providers/suppliers regularly involved in providing care):   Patient Care Team:  Agueda Hunter Donna Hoskins MD as PCP - General (Internal Medicine)  Balta Martinez MD as PCP - [facility] Empaneled Provider  Sara Murcia (Inactive) as Consulting Physician (Sleep Medicine Family Practice)  Rosa Moeller MD as Consulting Physician (Dermatology)  Debi Abdul MD as Consulting Physician (Otolaryngology)  Gabriela Barraza MD as Cardiologist (Interventional Cardiology)  Chapo Raymond MD as Consulting Physician (Cardiothoracic Surgery)  Tiffanie Mcclellan MD as Consulting Physician (Cardiology)  Dianne Garcia MD as Consulting Physician (Internal Medicine)     Reviewed and updated this visit:  Tobacco  Allergies  Meds  Problems  Med Hx  Surg Hx  Soc Hx  Fam Hx

## 2022-08-08 NOTE — ASSESSMENT & PLAN NOTE
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

## 2022-08-08 NOTE — ASSESSMENT & PLAN NOTE
Monitored by specialist- no acute findings meriting change in the plan cont with Cardiology as noted

## 2022-08-08 NOTE — PATIENT INSTRUCTIONS
Personalized Preventive Plan for Roc Diaz IV - 8/8/2022  Medicare offers a range of preventive health benefits. Some of the tests and screenings are paid in full while other may be subject to a deductible, co-insurance, and/or copay. Some of these benefits include a comprehensive review of your medical history including lifestyle, illnesses that may run in your family, and various assessments and screenings as appropriate. After reviewing your medical record and screening and assessments performed today your provider may have ordered immunizations, labs, imaging, and/or referrals for you. A list of these orders (if applicable) as well as your Preventive Care list are included within your After Visit Summary for your review. Other Preventive Recommendations:    A preventive eye exam performed by an eye specialist is recommended every 1-2 years to screen for glaucoma; cataracts, macular degeneration, and other eye disorders. A preventive dental visit is recommended every 6 months. Try to get at least 150 minutes of exercise per week or 10,000 steps per day on a pedometer . Order or download the FREE \"Exercise & Physical Activity: Your Everyday Guide\" from [employer]. Call 9-246.359.4863 or search [employer] online. You need 0727-8575 mg of calcium and 0784-1365 IU of vitamin D per day. It is possible to meet your calcium requirement with diet alone, but a vitamin D supplement is usually necessary to meet this goal.  When exposed to the sun, use a sunscreen that protects against both UVA and UVB radiation with an SPF of 30 or greater. Reapply every 2 to 3 hours or after sweating, drying off with a towel, or swimming. Always wear a seat belt when traveling in a car. Always wear a helmet when riding a bicycle or motorcycle.

## 2022-09-07 PROBLEM — Z00.00 WELL ADULT EXAM: Status: RESOLVED | Noted: 2017-01-17 | Resolved: 2022-09-07

## 2022-09-28 ENCOUNTER — OFFICE VISIT (OUTPATIENT)
Dept: INTERNAL MEDICINE CLINIC | Age: 67
End: 2022-09-28
Payer: MEDICARE

## 2022-09-28 VITALS
BODY MASS INDEX: 40.43 KG/M2 | HEART RATE: 69 BPM | OXYGEN SATURATION: 94 % | SYSTOLIC BLOOD PRESSURE: 128 MMHG | TEMPERATURE: 97.3 F | DIASTOLIC BLOOD PRESSURE: 88 MMHG | HEIGHT: 74 IN | WEIGHT: 315 LBS

## 2022-09-28 DIAGNOSIS — Z23 NEED FOR 23-POLYVALENT PNEUMOCOCCAL POLYSACCHARIDE VACCINE: ICD-10-CM

## 2022-09-28 DIAGNOSIS — Z23 NEED FOR INFLUENZA VACCINATION: ICD-10-CM

## 2022-09-28 DIAGNOSIS — Z09 HOSPITAL DISCHARGE FOLLOW-UP: Primary | ICD-10-CM

## 2022-09-28 PROCEDURE — 90694 VACC AIIV4 NO PRSRV 0.5ML IM: CPT | Performed by: HOSPITALIST

## 2022-09-28 PROCEDURE — 1111F DSCHRG MED/CURRENT MED MERGE: CPT | Performed by: HOSPITALIST

## 2022-09-28 PROCEDURE — G0008 ADMIN INFLUENZA VIRUS VAC: HCPCS | Performed by: HOSPITALIST

## 2022-09-28 PROCEDURE — 99496 TRANSJ CARE MGMT HIGH F2F 7D: CPT | Performed by: HOSPITALIST

## 2022-09-28 PROCEDURE — G0009 ADMIN PNEUMOCOCCAL VACCINE: HCPCS | Performed by: HOSPITALIST

## 2022-09-28 PROCEDURE — 90732 PPSV23 VACC 2 YRS+ SUBQ/IM: CPT | Performed by: HOSPITALIST

## 2022-09-28 NOTE — PROGRESS NOTES
Post-Discharge Transitional Care Management Progress Note      Sudarshan Martinez IV   YOB: 1955    Date of Office Visit:  9/28/2022  Date of Hospital Admission: 9/19/2022  Date of Hospital Discharge: 9/22/2022    Care management risk score Rising risk (score 2-5) and Complex Care (Scores >=6): No Risk Score On File     Non face to face  following discharge, date last encounter closed (first attempt may have been earlier):    Call initiated 2 business days of discharge: yes    ASSESSMENT/PLAN:   Hospital discharge follow-up  -     OK DISCHARGE MEDS RECONCILED W/ CURRENT OUTPATIENT MED LIST    Medical Decision Making: high complexity  Return if symptoms worsen or fail to improve. Subjective:   HPI:  Follow up of Hospital problems/diagnosis(es):   Discharge Diagnoses: Principal Problem:  Cellulitis of left lower extremity POA: Yes  Active Problems:  Essential hypertension POA: Yes  Hyperlipidemia POA: Yes  Morbid obesity with BMI of 45.0-49.9, adult (HCC) POA: Yes  s/p Bioprosthetic AVR (Aortic Root; KRYSTAL Clip) POA: Yes  Cellulitis of right lower leg POA: Yes  Acute stasis dermatitis POA: Yes    Inpatient course: Discharge summary reviewed- see chart. Hospital Course: The patient is a 74year old male whowith past medical history significant for coronary artery disease, history of aortic valve stenosis and bioprosthetic valve replacement, obstructive sleep apnea, hypertension and obesity with BMI more than 40 was admitted on 9/19/2022 with a stasis dermatitis and cellulitis of the lower extremity bilateral. Patient admitted for cellulitis of the lower extremity and Doppler ultrasound of lower extremity ruled out DVT. He was started on broad-spectrum IV antibiotics and continued with Lasix for diastolic congestive heart failure. During the hospitalization edema of the lower extremity and discoloration resolved. He has chronic stasis dermatitis with some discoloration of the lower extremity. Patient was educated about stocking and care for chronic stasis dermatitis. Lasix was changed to oral twice daily and antibiotic was changed to oral clindamycin for 7 more days. He was discharged home in a stable condition and need to be followed by primary care physician as outpatient. Interval history/Current status: The patient has been improving. Discoloration and excess edema resolved. Only residual baseline edema. Patient Active Problem List   Diagnosis    Knee pain, left    Benign neoplasm of skin    Other seborrheic keratosis    Essential hypertension    Sleep apnea    Palpitations    Reflux esophagitis    Mixed hyperlipidemia    BPH (benign prostatic hyperplasia)    Morbid obesity with BMI of 50.0-59.9, adult (HCC)    Arthritis of right knee    Nonrheumatic aortic valve stenosis    Nonrheumatic aortic valve insufficiency    Hyperglycemia    Ascending aortic aneurysm (HCC)    COVID-19    Wrist pain, acute, left       Medications listed as ordered at the time of discharge from hospital     Medication List            Accurate as of September 28, 2022  3:22 PM. If you have any questions, ask your nurse or doctor. CONTINUE taking these medications      aspirin 81 MG EC tablet     clindamycin 300 MG capsule  Commonly known as: CLEOCIN     esomeprazole 40 MG delayed release capsule  Commonly known as: NEXIUM  TAKE ONE CAPSULE BY MOUTH EVERY MORNING BEFORE BREAKFAST     fenofibrate 160 MG tablet  Commonly known as: TRIGLIDE     furosemide 40 MG tablet  Commonly known as: LASIX     lisinopril 20 MG tablet  Commonly known as: PRINIVIL;ZESTRIL     metoprolol tartrate 25 MG tablet  Commonly known as: LOPRESSOR  Take 1 tablet by mouth in the morning and 1 tablet before bedtime.      MULTIVITAMIN ADULTS 50+ PO     potassium chloride 10 MEQ extended release tablet  Commonly known as: KLOR-CON     rosuvastatin 20 MG tablet  Commonly known as: CRESTOR  TAKE 1 TABLET BY MOUTH EVERY NIGHT triamcinolone 0.1 % cream  Commonly known as: KENALOG     Vitamin D 1000 units Caps capsule  Commonly known as: CHOLECALCIFEROL                Medications marked \"taking\" at this time  Outpatient Medications Marked as Taking for the 9/28/22 encounter (Office Visit) with Sukhdev Hopkins MD   Medication Sig Dispense Refill    aspirin 81 MG EC tablet Take 81 mg by mouth Take one tablet once a day      fenofibrate (TRIGLIDE) 160 MG tablet 160 mg Take one tablet once a day      furosemide (LASIX) 40 MG tablet 40 mg as needed (edema)      metoprolol tartrate (LOPRESSOR) 25 MG tablet Take 1 tablet by mouth in the morning and 1 tablet before bedtime.  180 tablet 1    rosuvastatin (CRESTOR) 20 MG tablet TAKE 1 TABLET BY MOUTH EVERY NIGHT 90 tablet 1    lisinopril (PRINIVIL;ZESTRIL) 20 MG tablet Take 20 mg by mouth daily      esomeprazole (NEXIUM) 40 MG delayed release capsule TAKE ONE CAPSULE BY MOUTH EVERY MORNING BEFORE BREAKFAST 90 capsule 1    potassium chloride (KLOR-CON) 10 MEQ extended release tablet Take 10 mEq by mouth daily as needed (edema)      clindamycin (CLEOCIN) 300 MG capsule Take 300 mg by mouth 3 times daily Before dental procedure       Multiple Vitamins-Minerals (MULTIVITAMIN ADULTS 50+ PO) Take by mouth daily      Cholecalciferol (VITAMIN D) 1000 UNIT CAPS Take 2,000 Units by mouth daily           Medications patient taking as of now reconciled against medications ordered at time of hospital discharge: Yes        Objective:    /88 (Site: Left Upper Arm, Position: Sitting)   Pulse 69   Temp 97.3 °F (36.3 °C)   Ht 6' 2\" (1.88 m)   Wt (!) 387 lb (175.5 kg)   SpO2 94%   BMI 49.69 kg/m²       An electronic signature was used to authenticate this note.  --Maggie Julien MD

## 2022-10-31 NOTE — ASSESSMENT & PLAN NOTE
Cont repeat studies 7/21  May need repair in future
Cont repeat studies 7/21  May need repair in future
Controled with diet and meds
Diet controled
Found 7/20 was 4.6 cm to F/U with Dr Genny Herrera and Laure Trivedi as noted has remained  Stable  Will ? need repair in future
Has recovered no residue
Needs to diet and exercise
No recent symptoms cont to F/U as noted
Stable continue current meds and return in 3 mo.    needs to check BP at home
Stable no change in meds return in 3 mo.  Labs pending may need to change to Crestor  Since maxed out on Lipitor
If you experience any of the following, please notify your provider:  -fever >100.4F  -increased vaginal bleeding or clotting (saturating a pad an hour)  -foul smelling discharge or bloody discharge from your incision site  -severe abdominal, vaginal, or rectal pain   -persistent headache or vision changes  -swollen areas on your legs that are red, hot, or painful   -swollen, hot, painful areas and/or streaks on your breasts  -cracked or bleeding nipples  -mood swings, depression, or crying spells lasting more than 3 days     Nothing in the vagina for 6 weeks. No intercourse for 6 weeks. No bath tubs, swimming pools, or hot tubs for 6 weeks.

## 2022-11-02 RX ORDER — ROSUVASTATIN CALCIUM 20 MG/1
TABLET, COATED ORAL
Qty: 90 TABLET | Refills: 1 | Status: SHIPPED | OUTPATIENT
Start: 2022-11-02

## 2022-12-05 NOTE — PROGRESS NOTES
ENDOSCOPY PREOP INSTRUCTIONS      You are scheduled for a procedure at [facility] on 12-6 @ 1000. You will need to arrival by: 830 (at least an hour & a half prior to planned start time)  Report to the MAIN entrance on [address] and register at the surgery center on the left-hand side of the lobby  You will need your insurance card and photo id    For your procedure:     PLEASE FOLLOW ALL INSTRUCTIONS & PREPS GIVEN TO YOU BY YOUR DOCTOR'S OFFICE. If you have not received these instructions yet, please call the office immediately. Make sure to read them as soon as received. Bring an accurate list of any medications, including the dose/ frequency, with you on the day of the procedure. Make sure to include over the counter medications. If you are taking blood thinners, Aspirin or diabetic medication, make sure to call your doctor as soon as possible for instructions prior to your procedure. Please dress comfortably and do not wear any lotion, powders or jewelry  Arrange for someone to be with you and sign you out & drive you home after your procedure. THIS PERSON MUST WAIT AT HOSPITAL THE ENTIRE TIME. We allow 2 adults visitors with you in the hospital & everyone is required to wear a mask.     WOMEN ONLY OF CHILDBEARING AGE: Please make sure to be able to give a urine sample on arrival      If you have further questions, you may contact your Endoscopist's office or Pre Admission Testing staff at [phone]

## 2022-12-06 ENCOUNTER — ANESTHESIA EVENT (OUTPATIENT)
Dept: ENDOSCOPY | Age: 67
End: 2022-12-06
Payer: MEDICARE

## 2022-12-06 ENCOUNTER — HOSPITAL ENCOUNTER (OUTPATIENT)
Age: 67
Setting detail: OUTPATIENT SURGERY
Discharge: HOME | End: 2022-12-06
Attending: INTERNAL MEDICINE | Admitting: INTERNAL MEDICINE
Payer: MEDICARE

## 2022-12-06 ENCOUNTER — ANESTHESIA (OUTPATIENT)
Dept: ENDOSCOPY | Age: 67
End: 2022-12-06
Payer: MEDICARE

## 2022-12-06 VITALS
BODY MASS INDEX: 40.43 KG/M2 | DIASTOLIC BLOOD PRESSURE: 81 MMHG | RESPIRATION RATE: 16 BRPM | WEIGHT: 315 LBS | HEIGHT: 74 IN | HEART RATE: 64 BPM | TEMPERATURE: 98 F | OXYGEN SATURATION: 93 % | SYSTOLIC BLOOD PRESSURE: 156 MMHG

## 2022-12-06 DIAGNOSIS — K22.70 BARRETT'S ESOPHAGUS WITHOUT DYSPLASIA: ICD-10-CM

## 2022-12-06 DIAGNOSIS — Z86.010 HISTORY OF COLON POLYPS: ICD-10-CM

## 2022-12-06 PROCEDURE — 7100000011 HC PHASE II RECOVERY - ADDTL 15 MIN: Performed by: INTERNAL MEDICINE

## 2022-12-06 PROCEDURE — 2500000003 HC RX 250 WO HCPCS: Performed by: NURSE ANESTHETIST, CERTIFIED REGISTERED

## 2022-12-06 PROCEDURE — 88305 TISSUE EXAM BY PATHOLOGIST: CPT

## 2022-12-06 PROCEDURE — 2709999900 HC NON-CHARGEABLE SUPPLY: Performed by: INTERNAL MEDICINE

## 2022-12-06 PROCEDURE — 2580000003 HC RX 258: Performed by: NURSE ANESTHETIST, CERTIFIED REGISTERED

## 2022-12-06 PROCEDURE — 3609012400 HC EGD TRANSORAL BIOPSY SINGLE/MULTIPLE: Performed by: INTERNAL MEDICINE

## 2022-12-06 PROCEDURE — 7100000010 HC PHASE II RECOVERY - FIRST 15 MIN: Performed by: INTERNAL MEDICINE

## 2022-12-06 PROCEDURE — 3700000001 HC ADD 15 MINUTES (ANESTHESIA): Performed by: INTERNAL MEDICINE

## 2022-12-06 PROCEDURE — 2580000003 HC RX 258: Performed by: ANESTHESIOLOGY

## 2022-12-06 PROCEDURE — 6360000002 HC RX W HCPCS: Performed by: NURSE ANESTHETIST, CERTIFIED REGISTERED

## 2022-12-06 PROCEDURE — 3700000000 HC ANESTHESIA ATTENDED CARE: Performed by: INTERNAL MEDICINE

## 2022-12-06 PROCEDURE — 3609010600 HC COLONOSCOPY POLYPECTOMY SNARE/COLD BIOPSY: Performed by: INTERNAL MEDICINE

## 2022-12-06 RX ORDER — SODIUM CHLORIDE 0.9 % (FLUSH) 0.9 %
5-40 SYRINGE (ML) INJECTION EVERY 12 HOURS SCHEDULED
Status: DISCONTINUED | OUTPATIENT
Start: 2022-12-06 | End: 2022-12-06 | Stop reason: HOSPADM

## 2022-12-06 RX ORDER — SODIUM CHLORIDE 9 MG/ML
INJECTION, SOLUTION INTRAVENOUS PRN
Status: DISCONTINUED | OUTPATIENT
Start: 2022-12-06 | End: 2022-12-06 | Stop reason: HOSPADM

## 2022-12-06 RX ORDER — SODIUM CHLORIDE, SODIUM LACTATE, POTASSIUM CHLORIDE, CALCIUM CHLORIDE 600; 310; 30; 20 MG/100ML; MG/100ML; MG/100ML; MG/100ML
INJECTION, SOLUTION INTRAVENOUS CONTINUOUS
Status: DISCONTINUED | OUTPATIENT
Start: 2022-12-06 | End: 2022-12-06 | Stop reason: HOSPADM

## 2022-12-06 RX ORDER — SODIUM CHLORIDE, SODIUM LACTATE, POTASSIUM CHLORIDE, CALCIUM CHLORIDE 600; 310; 30; 20 MG/100ML; MG/100ML; MG/100ML; MG/100ML
INJECTION, SOLUTION INTRAVENOUS CONTINUOUS PRN
Status: DISCONTINUED | OUTPATIENT
Start: 2022-12-06 | End: 2022-12-06 | Stop reason: SDUPTHER

## 2022-12-06 RX ORDER — SODIUM CHLORIDE 0.9 % (FLUSH) 0.9 %
5-40 SYRINGE (ML) INJECTION PRN
Status: DISCONTINUED | OUTPATIENT
Start: 2022-12-06 | End: 2022-12-06 | Stop reason: HOSPADM

## 2022-12-06 RX ORDER — DIGOXIN 250 MCG
250 TABLET ORAL DAILY
COMMUNITY
Start: 2022-11-25

## 2022-12-06 RX ORDER — PROPOFOL 10 MG/ML
INJECTION, EMULSION INTRAVENOUS CONTINUOUS PRN
Status: DISCONTINUED | OUTPATIENT
Start: 2022-12-06 | End: 2022-12-06 | Stop reason: SDUPTHER

## 2022-12-06 RX ORDER — LIDOCAINE HYDROCHLORIDE 20 MG/ML
INJECTION, SOLUTION INFILTRATION; PERINEURAL PRN
Status: DISCONTINUED | OUTPATIENT
Start: 2022-12-06 | End: 2022-12-06 | Stop reason: SDUPTHER

## 2022-12-06 RX ORDER — PROPOFOL 10 MG/ML
INJECTION, EMULSION INTRAVENOUS PRN
Status: DISCONTINUED | OUTPATIENT
Start: 2022-12-06 | End: 2022-12-06 | Stop reason: SDUPTHER

## 2022-12-06 RX ORDER — LIDOCAINE HYDROCHLORIDE 10 MG/ML
1 INJECTION, SOLUTION EPIDURAL; INFILTRATION; INTRACAUDAL; PERINEURAL
Status: DISCONTINUED | OUTPATIENT
Start: 2022-12-06 | End: 2022-12-06 | Stop reason: HOSPADM

## 2022-12-06 RX ADMIN — SODIUM CHLORIDE, POTASSIUM CHLORIDE, SODIUM LACTATE AND CALCIUM CHLORIDE: 600; 310; 30; 20 INJECTION, SOLUTION INTRAVENOUS at 09:18

## 2022-12-06 RX ADMIN — PROPOFOL 150 MCG/KG/MIN: 10 INJECTION, EMULSION INTRAVENOUS at 09:35

## 2022-12-06 RX ADMIN — LIDOCAINE HYDROCHLORIDE 100 MG: 20 INJECTION, SOLUTION INFILTRATION; PERINEURAL at 09:35

## 2022-12-06 RX ADMIN — PROPOFOL 100 MG: 10 INJECTION, EMULSION INTRAVENOUS at 09:35

## 2022-12-06 RX ADMIN — PROPOFOL 50 MG: 10 INJECTION, EMULSION INTRAVENOUS at 09:38

## 2022-12-06 RX ADMIN — SODIUM CHLORIDE, SODIUM LACTATE, POTASSIUM CHLORIDE, AND CALCIUM CHLORIDE: .6; .31; .03; .02 INJECTION, SOLUTION INTRAVENOUS at 09:30

## 2022-12-06 ASSESSMENT — PAIN SCALES - GENERAL
PAINLEVEL_OUTOF10: 0

## 2022-12-06 ASSESSMENT — PAIN - FUNCTIONAL ASSESSMENT: PAIN_FUNCTIONAL_ASSESSMENT: NONE - DENIES PAIN

## 2022-12-06 NOTE — PROCEDURES
EGD and colonoscopy report    Patient:  James Ron IV                  1955    Referring Physician:  Kylie Cunha    Endoscopist: Ira Chanel     Indication:  BE; history of polyps     Medications:  MAC      Pre-Anesthesia Assessment:  I have reviewed and am in agreement with patient history and medication, including previous response to sedation. Prior to the procedure, a History and Physical was performed, and patient medications and allergies were reviewed. The patient is competent. The risks and benefits of the procedure and the sedation options and risks were discussed with the patient. Risks discussed included but were not limited to infection, bleeding, perforation, death, and missed lesions. All questions were answered and informed consent was obtained. Patient identification and proposed procedure were verified by the physician and the nurse in the pre-procedure area in the procedure room. Mallampatti: II  ASA Grade Assessment: 3     After reviewing the risks and benefits, the patient was deemed in satisfactory condition to undergo the procedure. The anesthesia plan was to use MAC anesthesia. Immediately prior to administration of medications, the patient was re-assessed for adequacy to receive sedatives and a time out was performed. Patient and healthcare providers were in agreement it was the correct patient and procedure. The heart rate, respiratory rate, oxygen saturations, blood pressure, adequacy of pulmonary ventilation, and response to care were monitored throughout the procedure. The physical status of the patient was re-assessed after the procedure. After obtaining informed consent, the endoscope was passed under direct vision. The endoscope was introduced through the mouth, and advanced to the second part of duodenum. The EGD was accomplished without difficulty. The patient tolerated the procedure well.    The duodenum was normal.  Scattered benign/fundic gland appearing polyps throughout the stomach and the largest 2 were biopsied. Retroflexion did not show a hiatal hernia. Upper end of gastric folds at 45cms with tongues and islands of salmon colored mucosa to 43cms. No nodularity under white light or NBI. 12 circumferential biopsies obtained. The patient was then positioned for a colonoscopy. A rectal examination was performed and showed hemorrhoids    The pediatric colonoscope was then advanced atraumatically into the rectum and advanced without difficulty to the cecum. The cecum was identified by the appendiceal orifice the ileocecal valve and other normal anatomy and a picture was obtained. The scope was then withdrawn (>6minutes) with close inspection of the mucosa in a circumferential manor. Retroflexed views under the ICV and of the right colon obtained. Mild diverticulosis. 6 polyps 3 to 5mm removed with cold snare. Retroflexed views of the rectum show hemorrhoids and a 3mm polyp removed with cold snare. No immediate complications. The preparation was good. Estimated blood loss none    Findings:  EGD: as above  Colon: as above    Plan:  The patient understands it is their responsibility to call for biopsy results in 7 days.    Continue PPI  Repeat EGD in 3 years if no dysplasia  Repeat colonoscopy in 3 years

## 2022-12-06 NOTE — DISCHARGE INSTRUCTIONS
ENDOSCOPY DISCHARGE INSTRUCTIONS:    Call the physician that did your procedure for any questions or concern:    [facility]: [phone]  DR. ALYSHA ARCHIBALD      ACTIVITY:    There are potential side effects to the medications used for sedation and anesthesia during your procedure. These include:  Dizziness or light-headedness, confusion or memory loss, delayed reaction times, loss of coordination, nausea and vomiting. Because of your increased risk for injury, we ask that you observe the following precautions: For the next 24 hours,  DO NOT operate an automobile, bicycle, motorcycle, , power tools or large equipment of any kind. Do not drink alcohol, sign any legal documents or make any legal decisions for 24 hours. Do not bend your head over lower than your heart. DO sit on the side of bed/couch awhile before getting up. Plan on bedrest or quiet relaxation today. You may resume normal activities in 24 hours. DIET:    Your first meal today should be light, avoiding spicy and fatty foods. If you tolerate this first meal, then you may advance to your regular diet unless otherwise advised by your physician. NORMAL SYMPTOMS:  -Mild sore throat if youve had an EGD   -Gaseous discomfort for EGD or colonoscopy    NOTIFY YOUR PHYSICIAN IF THESE SYMPTOMS OCCUR:  1. Fever (greater than 100)  5. Increased abdominal bloating  2. Severe pain    6. Excessive bleeding  3. Nausea and vomiting  7. Chest pain                                                                    4. Chills    8. Shortness of breath    ADDITIONAL INSTRUCTIONS:    Biopsy results: Call [address] for biopsy results in 1 week      Please review these discharge instructions this evening or tomorrow for  information you may have forgotten. We want to thank you for choosing the [facility] as your health care provider. We always strive to provide you with excellent care while you are here.  You may receive a survey in the mail regarding your care. We would appreciate you taking a few minutes of your time to complete this survey.

## 2022-12-06 NOTE — ANESTHESIA PRE PROCEDURE
Department of Anesthesiology  Preprocedure Note       Name:  Dariela Arguelles IV   Age:  74 y.o.  :  1955                                          MRN:  [id number]         Date:  2022      Surgeon: Rob Donaldson):  Asberry Cushing, MD    Procedure: Procedure(s):  EGD BIOPSY  COLONOSCOPY POLYPECTOMY SNARE/COLD BIOPSY    Medications prior to admission:   Prior to Admission medications    Medication Sig Start Date End Date Taking? Authorizing Provider   digoxin (LANOXIN) 250 MCG tablet Take 250 mcg by mouth daily 22  Yes Historical Provider, MD   rosuvastatin (CRESTOR) 20 MG tablet TAKE 1 TABLET BY MOUTH EVERY NIGHT 22   Parker Rizvi MD   aspirin 81 MG EC tablet Take 81 mg by mouth Take one tablet once a day    Historical Provider, MD   fenofibrate (TRIGLIDE) 160 MG tablet 160 mg Take one tablet once a day    Historical Provider, MD   furosemide (LASIX) 40 MG tablet 40 mg as needed (edema)    Historical Provider, MD   metoprolol tartrate (LOPRESSOR) 25 MG tablet Take 1 tablet by mouth in the morning and 1 tablet before bedtime.  22   Lico Will MD   lisinopril (PRINIVIL;ZESTRIL) 20 MG tablet Take 20 mg by mouth daily    Historical Provider, MD   esomeprazole (NEXIUM) 40 MG delayed release capsule TAKE ONE CAPSULE BY MOUTH EVERY MORNING BEFORE BREAKFAST 22   Lico Will MD   triamcinolone (KENALOG) 0.1 % cream APPLY TO RASH TWICE DAILY UP TO 2 WEEKS PER MONTH AS NEEDED  Patient not taking: Reported on 2022 5/3/21   Historical Provider, MD   potassium chloride (KLOR-CON) 10 MEQ extended release tablet Take 10 mEq by mouth daily as needed (edema) 21   Historical Provider, MD   clindamycin (CLEOCIN) 300 MG capsule Take 300 mg by mouth 3 times daily Before dental procedure     Historical Provider, MD   Multiple Vitamins-Minerals (MULTIVITAMIN ADULTS 50+ PO) Take by mouth daily    Historical Provider, MD   Cholecalciferol (VITAMIN D) 1000 UNIT CAPS Take 2,000 Units by mouth daily     Historical Provider, MD       Current medications:    Current Facility-Administered Medications   Medication Dose Route Frequency Provider Last Rate Last Admin    lidocaine PF 1 % injection 1 mL  1 mL IntraDERmal Once PRN Mihai Rivera MD        lactated ringers infusion   IntraVENous Continuous Mihai Rivera MD   Stopped at 12/06/22 1036    sodium chloride flush 0.9 % injection 5-40 mL  5-40 mL IntraVENous 2 times per day Mihai Rivera MD        sodium chloride flush 0.9 % injection 5-40 mL  5-40 mL IntraVENous PRN Mihai Rivera MD        0.9 % sodium chloride infusion   IntraVENous PRN Mihai Rivera MD        sodium chloride flush 0.9 % injection 5-40 mL  5-40 mL IntraVENous 2 times per day Cristal Fried MD        sodium chloride flush 0.9 % injection 5-40 mL  5-40 mL IntraVENous PRN Cristal Fried MD        0.9 % sodium chloride infusion   IntraVENous PRN Cristal Fried MD           Allergies: Allergies   Allergen Reactions    Chocolate Other (See Comments)     Cold like symptoms     Ciprofloxacin      ?  Cardiac side effects     Dairy Digestive [Tilactase]     Other      dairy    Penicillins     Food Other (See Comments)     coldlike symptoms       Problem List:    Patient Active Problem List   Diagnosis Code    Knee pain, left M25.569    Benign neoplasm of skin D23.9    Other seborrheic keratosis L82.1    Essential hypertension I10    Sleep apnea G47.30    Palpitations R00.2    Reflux esophagitis K21.00    Mixed hyperlipidemia E78.2    BPH (benign prostatic hyperplasia) N40.0    Morbid obesity with BMI of 50.0-59.9, adult (HCC) E66.01, Z68.43    Arthritis of right knee M17.11    Nonrheumatic aortic valve stenosis I35.0    Nonrheumatic aortic valve insufficiency I35.1    Hyperglycemia R73.9    Ascending aortic aneurysm I71.21    COVID-19 U07.1    Wrist pain, acute, left M25.532       Past Medical History:        Diagnosis Date    Apnea     Hyperlipidemia  Kidney stone on right side 1/2016    passed ok     Syncope 10/2004    Unspecified sleep apnea     using C-PAP       Past Surgical History:        Procedure Laterality Date    COLONOSCOPY  01/26/2004    POLYP    COLONOSCOPY  12/13/2016    polyps Dr. Oskar Shepherd repeat in 2020    ENDOSCOPY, COLON, [address]      left knee    JOINT REPLACEMENT Left 11/19/13    L TKR Dr Alireza Fonseca Right 11/2014    R TKR Dr Konrad Melton  03/23/2018    Dr. Katy Spann repeat in 2021       Social History:    Social History     Tobacco Use    Smoking status: Never    Smokeless tobacco: Never   Substance Use Topics    Alcohol use: Yes     Comment: occ                                Counseling given: Not Answered      Vital Signs (Current):   Vitals:    12/06/22 0849 12/06/22 1012 12/06/22 1020 12/06/22 1030   BP: (!) 158/92 137/61 (!) 145/77 (!) 156/81   Pulse: 65 70 71 64   Resp: 18 18 18 16   Temp: 97.6 °F (36.4 °C) 98 °F (36.7 °C)     TempSrc: Temporal Temporal     SpO2: 93% 92% 92% 93%   Weight: (!) 375 lb (170.1 kg)      Height: 6' 2\" (1.88 m)                                                 BP Readings from Last 3 Encounters:   12/06/22 (!) 156/81   09/28/22 128/88   08/08/22 138/78       NPO Status: Time of last liquid consumption: 0300                        Time of last solid consumption: 2000                        Date of last liquid consumption: 12/06/22                        Date of last solid food consumption: 12/04/22    BMI:   Wt Readings from Last 3 Encounters:   12/06/22 (!) 375 lb (170.1 kg)   09/28/22 (!) 387 lb (175.5 kg)   08/08/22 (!) 386 lb 9.6 oz (175.4 kg)     Body mass index is 48.15 kg/m².     CBC:   Lab Results   Component Value Date/Time    WBC 6.7 08/03/2022 09:07 AM    RBC 5.18 08/03/2022 09:07 AM    HGB 16.9 08/03/2022 09:07 AM    HCT 49.0 08/03/2022 09:07 AM MCV 94.6 08/03/2022 09:07 AM    RDW 14.0 08/03/2022 09:07 AM     08/03/2022 09:07 AM       CMP:   Lab Results   Component Value Date/Time     08/03/2022 09:07 AM    K 4.3 08/03/2022 09:07 AM     08/03/2022 09:07 AM    CO2 22 08/03/2022 09:07 AM    BUN 17 08/03/2022 09:07 AM    CREATININE 1.0 08/03/2022 09:07 AM    GFRAA >60 08/03/2022 09:07 AM    GFRAA >60 03/29/2013 09:08 AM    AGRATIO 2.0 08/03/2022 09:07 AM    LABGLOM >60 08/03/2022 09:07 AM    LABGLOM 91 12/23/2015 08:06 AM    GLUCOSE 122 08/03/2022 09:07 AM    GLUCOSE 115 11/07/2011 08:38 AM    PROT 6.6 08/03/2022 09:07 AM    PROT 7.0 11/30/2012 11:56 AM    CALCIUM 9.6 08/03/2022 09:07 AM    BILITOT 0.7 08/03/2022 09:07 AM    ALKPHOS 61 08/03/2022 09:07 AM    AST 24 08/03/2022 09:07 AM    ALT 23 08/03/2022 09:07 AM       POC Tests: No results for input(s): POCGLU, POCNA, POCK, POCCL, POCBUN, POCHEMO, POCHCT in the last 72 hours. Coags:   Lab Results   Component Value Date/Time    PROTIME 10.1 10/18/2014 12:06 PM    INR 0.94 10/18/2014 12:06 PM    APTT 26.2 10/18/2014 12:06 PM       HCG (If Applicable): No results found for: PREGTESTUR, PREGSERUM, HCG, HCGQUANT     ABGs: No results found for: PHART, PO2ART, WAG7PAW, BVE9EKK, BEART, V2GLVEQA     Type & Screen (If Applicable):  No results found for: LABABO, LABRH    Drug/Infectious Status (If Applicable):  No results found for: HIV, HEPCAB    COVID-19 Screening (If Applicable):   Lab Results   Component Value Date/Time    COVID19 INDETERMINATE 09/14/2020 10:22 PM           Anesthesia Evaluation    Airway: Mallampati: III  TM distance: >3 FB   Neck ROM: full  Mouth opening: > = 3 FB   Dental:          Pulmonary:   (+) sleep apnea:                             Cardiovascular:    (+) hypertension:,                   Neuro/Psych:               GI/Hepatic/Renal:             Endo/Other:                     Abdominal:             Vascular:           Other Findings:           Anesthesia Plan      MAC ASA 3             Anesthetic plan and risks discussed with patient.                         Diandra Khan MD   12/6/2022

## 2022-12-06 NOTE — PROGRESS NOTES
Ambulatory Surgery/Procedure Discharge Note    Vitals:    12/06/22 1030   BP: (!) 156/81   Pulse: 64   Resp: 16   Temp:    SpO2: 93%       In: 600 [I.V.:600]  Out: -  Pt declined offer of drink or use of bathroom    Restroom use offered before discharge. Yes -- declined    Pain assessment:  none  Pain Level: 0      BP of 156/81 within 20% of preop BP. Pt returned to Endo recovery alert; speech clear; breathing easily on RA; denies any pain; declined offer of drink. Dr. Munoz came to bedside to talk with wife and pt. Discharge instructions discussed with pt and pt's wife, and both verbalized understanding. IV removed, and dressing applied. Patient discharged to home/self care.  Patient discharged via wheel chair by personnel to waiting family/S.O.       12/6/2022 10:44 AM

## 2022-12-06 NOTE — ANESTHESIA PRE PROCEDURE
Department of Anesthesiology  Preprocedure Note       Name:  Darrell Martinez   Age:  74 y.o.  :  1955                                          MRN:  [id number]         Date:  2022      Surgeon: Philippe Cunha):  Anel Gutierrez MD    Procedure: Procedure(s):  ESOPHAGOGASTRODUODENOSCOPY  COLONOSCOPY    Medications prior to admission:   Prior to Admission medications    Medication Sig Start Date End Date Taking? Authorizing Provider   rosuvastatin (CRESTOR) 20 MG tablet TAKE 1 TABLET BY MOUTH EVERY NIGHT 22   Lawrance Cogan, MD   aspirin 81 MG EC tablet Take 81 mg by mouth Take one tablet once a day    Historical Provider, MD   fenofibrate (TRIGLIDE) 160 MG tablet 160 mg Take one tablet once a day    Historical Provider, MD   furosemide (LASIX) 40 MG tablet 40 mg as needed (edema)    Historical Provider, MD   metoprolol tartrate (LOPRESSOR) 25 MG tablet Take 1 tablet by mouth in the morning and 1 tablet before bedtime.  22   Gretchen Nam MD   lisinopril (PRINIVIL;ZESTRIL) 20 MG tablet Take 20 mg by mouth daily    Historical Provider, MD   esomeprazole (NEXIUM) 40 MG delayed release capsule TAKE ONE CAPSULE BY MOUTH EVERY MORNING BEFORE BREAKFAST 22   Gretchen Nam MD   triamcinolone (KENALOG) 0.1 % cream APPLY TO RASH TWICE DAILY UP TO 2 WEEKS PER MONTH AS NEEDED  Patient not taking: Reported on 2022 5/3/21   Historical Provider, MD   potassium chloride (KLOR-CON) 10 MEQ extended release tablet Take 10 mEq by mouth daily as needed (edema) 21   Historical Provider, MD   clindamycin (CLEOCIN) 300 MG capsule Take 300 mg by mouth 3 times daily Before dental procedure     Historical Provider, MD   Multiple Vitamins-Minerals (MULTIVITAMIN ADULTS 50+ PO) Take by mouth daily    Historical Provider, MD   Cholecalciferol (VITAMIN D) 1000 UNIT CAPS Take 2,000 Units by mouth daily     Historical Provider, MD       Current medications:    Current Facility-Administered Medications Medication Dose Route Frequency Provider Last Rate Last Admin    lidocaine PF 1 % injection 1 mL  1 mL IntraDERmal Once PRN Edison Saul MD        lactated ringers infusion   IntraVENous Continuous dEison Saul MD        sodium chloride flush 0.9 % injection 5-40 mL  5-40 mL IntraVENous 2 times per day Edison Saul MD        sodium chloride flush 0.9 % injection 5-40 mL  5-40 mL IntraVENous PRN Edison Saul MD        0.9 % sodium chloride infusion   IntraVENous PRN Edison Saul MD           Allergies: Allergies   Allergen Reactions    Chocolate Other (See Comments)     Cold like symptoms     Ciprofloxacin      ?  Cardiac side effects     Dairy Digestive [Tilactase]     Other      dairy    Penicillins     Food Other (See Comments)     coldlike symptoms       Problem List:    Patient Active Problem List   Diagnosis Code    Knee pain, left M25.569    Benign neoplasm of skin D23.9    Other seborrheic keratosis L82.1    Essential hypertension I10    Sleep apnea G47.30    Palpitations R00.2    Reflux esophagitis K21.00    Mixed hyperlipidemia E78.2    BPH (benign prostatic hyperplasia) N40.0    Morbid obesity with BMI of 50.0-59.9, adult (HCC) E66.01, Z68.43    Arthritis of right knee M17.11    Nonrheumatic aortic valve stenosis I35.0    Nonrheumatic aortic valve insufficiency I35.1    Hyperglycemia R73.9    Ascending aortic aneurysm I71.21    COVID-19 U07.1    Wrist pain, acute, left M25.532       Past Medical History:        Diagnosis Date    Apnea     Hyperlipidemia     Kidney stone on right side 1/2016    passed ok     Syncope 10/2004    Unspecified sleep apnea     using C-PAP       Past Surgical History:        Procedure Laterality Date    COLONOSCOPY  01/26/2004    POLYP    COLONOSCOPY  12/13/2016    polyps Dr. Bimal Green repeat in 2020    ENDOSCOPY, COLON, DIAGNOSTIC      HERNIA REPAIR      [facility] REPA[facility]  1998, 1999    [facility]    JOINT REPLACEMENT left knee    JOINT REPLACEMENT Left 11/19/13    L TKR Dr Bustamante Nurse Right 11/2014    R TKR Dr Ian Allan  03/23/2018    Dr. Yanci Holder repeat in 2021       Social History:    Social History     Tobacco Use    Smoking status: Never    Smokeless tobacco: Never   Substance Use Topics    Alcohol use: Yes     Comment: occ                                Counseling given: Not Answered      Vital Signs (Current):   Vitals:    12/06/22 0849   BP: (!) 158/92   Pulse: 65   Resp: 18   Temp: 97.6 °F (36.4 °C)   TempSrc: Temporal   SpO2: 93%   Weight: (!) 375 lb (170.1 kg)   Height: 6' 2\" (1.88 m)                                              BP Readings from Last 3 Encounters:   12/06/22 (!) 158/92   09/28/22 128/88   08/08/22 138/78       NPO Status:                                                                                 BMI:   Wt Readings from Last 3 Encounters:   12/06/22 (!) 375 lb (170.1 kg)   09/28/22 (!) 387 lb (175.5 kg)   08/08/22 (!) 386 lb 9.6 oz (175.4 kg)     Body mass index is 48.15 kg/m².     CBC:   Lab Results   Component Value Date/Time    WBC 6.7 08/03/2022 09:07 AM    RBC 5.18 08/03/2022 09:07 AM    HGB 16.9 08/03/2022 09:07 AM    HCT 49.0 08/03/2022 09:07 AM    MCV 94.6 08/03/2022 09:07 AM    RDW 14.0 08/03/2022 09:07 AM     08/03/2022 09:07 AM       CMP:   Lab Results   Component Value Date/Time     08/03/2022 09:07 AM    K 4.3 08/03/2022 09:07 AM     08/03/2022 09:07 AM    CO2 22 08/03/2022 09:07 AM    BUN 17 08/03/2022 09:07 AM    CREATININE 1.0 08/03/2022 09:07 AM    GFRAA >60 08/03/2022 09:07 AM    GFRAA >60 03/29/2013 09:08 AM    AGRATIO 2.0 08/03/2022 09:07 AM    LABGLOM >60 08/03/2022 09:07 AM    LABGLOM 91 12/23/2015 08:06 AM    GLUCOSE 122 08/03/2022 09:07 AM    GLUCOSE 115 11/07/2011 08:38 AM    PROT 6.6 08/03/2022 09:07 AM    PROT 7.0 11/30/2012 11:56 AM    CALCIUM 9.6 08/03/2022 09:07 AM    BILITOT 0.7 08/03/2022 09:07 AM    ALKPHOS 61 08/03/2022 09:07 AM    AST 24 08/03/2022 09:07 AM    ALT 23 08/03/2022 09:07 AM       POC Tests: No results for input(s): POCGLU, POCNA, POCK, POCCL, POCBUN, POCHEMO, POCHCT in the last 72 hours. Coags:   Lab Results   Component Value Date/Time    PROTIME 10.1 10/18/2014 12:06 PM    INR 0.94 10/18/2014 12:06 PM    APTT 26.2 10/18/2014 12:06 PM       HCG (If Applicable): No results found for: PREGTESTUR, PREGSERUM, HCG, HCGQUANT     ABGs: No results found for: PHART, PO2ART, RDH8ATA, CHT6AHR, BEART, N1MEGCYS     Type & Screen (If Applicable):  No results found for: LABABO, LABRH    Drug/Infectious Status (If Applicable):  No results found for: HIV, HEPCAB    COVID-19 Screening (If Applicable):   Lab Results   Component Value Date/Time    COVID19 INDETERMINATE 09/14/2020 10:22 PM           Anesthesia Evaluation  Patient summary reviewed no history of anesthetic complications:   Airway: Mallampati: III  TM distance: >3 FB   Neck ROM: full  Mouth opening: > = 3 FB   Dental: normal exam         Pulmonary:normal exam    (+) sleep apnea: on CPAP,                             Cardiovascular:  Exercise tolerance: poor (<4 METS),   (+) hypertension: moderate, valvular problems/murmurs:, pacemaker: pacemaker,                ROS comment: Pt had heart surgery last year. Valvular repair(pt not sure of valve). Also had pacemaker placed. Neuro/Psych:               GI/Hepatic/Renal:            ROS comment: Some dysphagia/changes during last EGD. Endo/Other:                     Abdominal:             Vascular: Other Findings:           Anesthesia Plan      MAC     ASA 3             Anesthetic plan and risks discussed with patient.         Attending anesthesiologist reviewed and agrees with Philip Lima MD   12/6/2022

## 2022-12-06 NOTE — H&P
Pre-operative History and Physical    Patient: Veronica Rodriguez IV  : 1955     History Obtained From:  patient, electronic medical record    HISTORY OF PRESENT ILLNESS:    The patient is a 74 y.o. male who presents for an EGD and colonoscopy for BE and history of polyps. Past Medical History:        Diagnosis Date    Apnea     Hyperlipidemia     Kidney stone on right side 2016    passed ok     Syncope 10/2004    Unspecified sleep apnea     using C-PAP     Past Surgical History:        Procedure Laterality Date    COLONOSCOPY  2004    POLYP    COLONOSCOPY  2016    polyps Dr. Radha Schneider repeat in     ENDOSCOPY, COLON, [address], 1999    [address]      left knee    JOINT REPLACEMENT Left 13    L TKR Dr Kale Carter Right 2014    R TKR Dr Loras Prader  2018    Dr. Zeyad Christian repeat in      Medications Prior to Admission:   No current facility-administered medications on file prior to encounter. Current Outpatient Medications on File Prior to Encounter   Medication Sig Dispense Refill    aspirin 81 MG EC tablet Take 81 mg by mouth Take one tablet once a day      fenofibrate (TRIGLIDE) 160 MG tablet 160 mg Take one tablet once a day      furosemide (LASIX) 40 MG tablet 40 mg as needed (edema)      metoprolol tartrate (LOPRESSOR) 25 MG tablet Take 1 tablet by mouth in the morning and 1 tablet before bedtime.  180 tablet 1    lisinopril (PRINIVIL;ZESTRIL) 20 MG tablet Take 20 mg by mouth daily      esomeprazole (NEXIUM) 40 MG delayed release capsule TAKE ONE CAPSULE BY MOUTH EVERY MORNING BEFORE BREAKFAST 90 capsule 1    triamcinolone (KENALOG) 0.1 % cream APPLY TO RASH TWICE DAILY UP TO 2 WEEKS PER MONTH AS NEEDED (Patient not taking: Reported on 2022)      potassium chloride (KLOR-CON) 10 MEQ extended release tablet Take 10 mEq by mouth daily as needed (edema)      clindamycin (CLEOCIN) 300 MG capsule Take 300 mg by mouth 3 times daily Before dental procedure       Multiple Vitamins-Minerals (MULTIVITAMIN ADULTS 50+ PO) Take by mouth daily      Cholecalciferol (VITAMIN D) 1000 UNIT CAPS Take 2,000 Units by mouth daily        Allergies:  Chocolate, Ciprofloxacin, Dairy digestive [tilactase], Other, Penicillins, and Food    History of allergic reaction to anesthesia:  No    Social History:   TOBACCO:   reports that he has never smoked. He has never used smokeless tobacco.  ETOH:   reports current alcohol use. DRUGS:   reports no history of drug use. Family History:       Problem Relation Age of Onset    Cancer Mother         ovarian    Other Father         Pulmonary Fibrosis       PHYSICAL EXAM:      Vital per nursing notes    Heart:  No m/r/g +s1/s2 RRR    Lungs:  CTA bilaterally    Abdomen:  Soft, nontender, non distended; +bs    ASA Grade:  ASA 3 - Patient with moderate systemic disease with functional limitations    Mallampati Class:  Class I: Soft palate, uvula, fauces, pillars visible  __________  Class II: Soft palate, uvula, fauces visible  ____x______   Class III: Soft palate, base of uvula visible  __________  Class IV: Hard palate only visible   __________      ASSESSMENT AND PLAN:    1. Patient is a 74 y.o. male here for an EGD and colonoscopy   2. Procedure options, risks and benefits reviewed with patient. We specifically discussed that risks include, but are not limited to infection, bleeding, perforation, death, and missed lesions. Patient expresses understanding.

## 2022-12-29 DIAGNOSIS — J06.9 UPPER RESPIRATORY TRACT INFECTION, UNSPECIFIED TYPE: Primary | ICD-10-CM

## 2022-12-29 RX ORDER — PROMETHAZINE HYDROCHLORIDE AND PHENYLEPHRINE HYDROCHLORIDE 6.25; 5 MG/5ML; MG/5ML
5 SYRUP ORAL EVERY 6 HOURS
Qty: 180 ML | Refills: 0 | Status: SHIPPED | OUTPATIENT
Start: 2022-12-29

## 2022-12-29 RX ORDER — AZITHROMYCIN 250 MG/1
250 TABLET, FILM COATED ORAL SEE ADMIN INSTRUCTIONS
Qty: 6 TABLET | Refills: 0 | Status: SHIPPED | OUTPATIENT
Start: 2022-12-29 | End: 2023-01-03

## 2022-12-29 NOTE — TELEPHONE ENCOUNTER
Pt complains of cough and congestion since having COVID. No temp and Oxygen level has been running good. Per Dr. Vanessa Mcleod ok to order a Z-man & Phenergan VC plain and cancel AM appt. Patient agrees with recommendation,  scripts will be sent and appt will be canceled.

## 2023-01-03 ENCOUNTER — HOSPITAL ENCOUNTER (OUTPATIENT)
Dept: GENERAL RADIOLOGY | Age: 68
Discharge: HOME | End: 2023-01-03
Payer: MEDICARE

## 2023-01-03 ENCOUNTER — TELEPHONE (OUTPATIENT)
Dept: INTERNAL MEDICINE CLINIC | Age: 68
End: 2023-01-03

## 2023-01-03 DIAGNOSIS — J06.9 UPPER RESPIRATORY TRACT INFECTION, UNSPECIFIED TYPE: Primary | ICD-10-CM

## 2023-01-03 DIAGNOSIS — J06.9 UPPER RESPIRATORY TRACT INFECTION, UNSPECIFIED TYPE: ICD-10-CM

## 2023-01-03 PROCEDURE — 71046 X-RAY EXAM CHEST 2 VIEWS: CPT

## 2023-01-03 NOTE — TELEPHONE ENCOUNTER
----- Message from Danni Warren sent at 1/3/2023 10:22 AM EST -----  Subject: Referral Request    Reason for referral request? chest x-ray   Provider patient wants to be referred to(if known):     Provider Phone Number(if known):    Additional Information for Provider? t states she spoke with isa last   week, said she would order ray he declined but would like it now.   ---------------------------------------------------------------------------  --------------  CALL BACK INFO    [phone]; OK to leave message on voicemail  ---------------------------------------------------------------------------  --------------

## 2023-01-17 ENCOUNTER — TELEMEDICINE (OUTPATIENT)
Dept: INTERNAL MEDICINE CLINIC | Age: 68
End: 2023-01-17
Payer: MEDICARE

## 2023-01-17 DIAGNOSIS — J06.9 UPPER RESPIRATORY TRACT INFECTION, UNSPECIFIED TYPE: Primary | ICD-10-CM

## 2023-01-17 PROCEDURE — 3017F COLORECTAL CA SCREEN DOC REV: CPT | Performed by: HOSPITALIST

## 2023-01-17 PROCEDURE — 1036F TOBACCO NON-USER: CPT | Performed by: HOSPITALIST

## 2023-01-17 PROCEDURE — G8417 CALC BMI ABV UP PARAM F/U: HCPCS | Performed by: HOSPITALIST

## 2023-01-17 PROCEDURE — G8484 FLU IMMUNIZE NO ADMIN: HCPCS | Performed by: HOSPITALIST

## 2023-01-17 PROCEDURE — G8427 DOCREV CUR MEDS BY ELIG CLIN: HCPCS | Performed by: HOSPITALIST

## 2023-01-17 PROCEDURE — 99213 OFFICE O/P EST LOW 20 MIN: CPT | Performed by: HOSPITALIST

## 2023-01-17 PROCEDURE — 1123F ACP DISCUSS/DSCN MKR DOCD: CPT | Performed by: HOSPITALIST

## 2023-01-17 RX ORDER — DIGOXIN 0.25 MG/ML
125 INJECTION INTRAMUSCULAR; INTRAVENOUS DAILY
COMMUNITY

## 2023-01-17 RX ORDER — CLINDAMYCIN HYDROCHLORIDE 300 MG/1
300 CAPSULE ORAL 3 TIMES DAILY
Qty: 21 CAPSULE | Refills: 0 | Status: CANCELLED | OUTPATIENT
Start: 2023-01-17 | End: 2023-01-24

## 2023-01-17 ASSESSMENT — PATIENT HEALTH QUESTIONNAIRE - PHQ9
SUM OF ALL RESPONSES TO PHQ QUESTIONS 1-9: 0
SUM OF ALL RESPONSES TO PHQ QUESTIONS 1-9: 0
1. LITTLE INTEREST OR PLEASURE IN DOING THINGS: 0
SUM OF ALL RESPONSES TO PHQ QUESTIONS 1-9: 0
SUM OF ALL RESPONSES TO PHQ QUESTIONS 1-9: 0
2. FEELING DOWN, DEPRESSED OR HOPELESS: 0
SUM OF ALL RESPONSES TO PHQ9 QUESTIONS 1 & 2: 0

## 2023-01-17 ASSESSMENT — ENCOUNTER SYMPTOMS
SINUS PRESSURE: 0
CONSTIPATION: 0
BLOOD IN STOOL: 0
ABDOMINAL PAIN: 0
VOMITING: 0
COUGH: 1
VOICE CHANGE: 0
CHEST TIGHTNESS: 0
TROUBLE SWALLOWING: 0
RHINORRHEA: 1
BACK PAIN: 0
ABDOMINAL DISTENTION: 0
SHORTNESS OF BREATH: 0
SINUS PAIN: 0
DIARRHEA: 0
WHEEZING: 0
NAUSEA: 0
SORE THROAT: 0

## 2023-01-17 NOTE — PROGRESS NOTES
Barbara Client IV (:  1955) is a Established patient, here for evaluation of the following:    Assessment & Plan   Below is the assessment and plan developed based on review of pertinent history, physical exam, labs, studies, and medications. 1. Upper respiratory tract infection, unspecified type  The following orders have not been finalized:  -     clindamycin (CLEOCIN) 300 MG capsule  No follow-ups on file. Subjective   The patient had a mild episode of COVID in mid-December treated with paxlovid. He recovered well soon after another episode he was describing as bronchitis. I prescribed a z-pack for him at that time. His last COVID test one week ago was negative. Review of Systems   Constitutional:  Positive for fatigue and fever (Low grade temp to 99.5). Negative for activity change, appetite change, chills, diaphoresis and unexpected weight change. HENT:  Positive for postnasal drip and rhinorrhea. Negative for sinus pressure, sinus pain, sore throat, trouble swallowing and voice change. Eyes:  Negative for visual disturbance. Respiratory:  Positive for cough (Productive of yellow green sputum). Negative for chest tightness, shortness of breath and wheezing. Cardiovascular:  Negative for chest pain, palpitations and leg swelling. Gastrointestinal:  Negative for abdominal distention, abdominal pain, blood in stool, constipation, diarrhea, nausea and vomiting. Endocrine: Negative for polydipsia and polyphagia. Genitourinary:  Negative for decreased urine volume, difficulty urinating, dysuria and urgency. Musculoskeletal:  Negative for back pain, gait problem, joint swelling and myalgias. Neurological:  Positive for headaches (Mild one yesterday. ). Negative for dizziness, seizures, syncope and light-headedness. Psychiatric/Behavioral:  Negative for agitation, behavioral problems, confusion and suicidal ideas.          Objective   Patient-Reported Vitals  Patient-Reported Pulse Oximetry: 79       Physical Exam         On this date 1/17/2023 I have spent 35 minutes reviewing previous notes, test results and face to face (virtual) with the patient discussing the diagnosis and importance of compliance with the treatment plan as well as documenting on the day of the visit. Amie Khanna was evaluated through a synchronous (real-time) audio-video encounter. The patient (or guardian if applicable) is aware that this is a billable service, which includes applicable co-pays. This Virtual Visit was conducted with patient's (and/or legal guardian's) consent. The visit was conducted pursuant to the emergency declaration under the [address] [place] [address], [address] [place] [address] authority and the [employer] and [employer] General Act. Patient identification was verified, and a caregiver was present when appropriate. The patient was located at Home: [place] [id number]. Provider was located at [facility] ([address] [place], [place] [address]t): [phone] E.  [address] [place] [address] [address],  [address] Jessica Cook MD

## 2023-02-06 SDOH — HEALTH STABILITY: PHYSICAL HEALTH: ON AVERAGE, HOW MANY DAYS PER WEEK DO YOU ENGAGE IN MODERATE TO STRENUOUS EXERCISE (LIKE A BRISK WALK)?: 2 DAYS

## 2023-02-06 SDOH — HEALTH STABILITY: PHYSICAL HEALTH: ON AVERAGE, HOW MANY MINUTES DO YOU ENGAGE IN EXERCISE AT THIS LEVEL?: 20 MIN

## 2023-02-06 ASSESSMENT — SOCIAL DETERMINANTS OF HEALTH (SDOH)
WITHIN THE LAST YEAR, HAVE YOU BEEN AFRAID OF YOUR PARTNER OR EX-PARTNER?: NO
WITHIN THE LAST YEAR, HAVE YOU BEEN KICKED, HIT, SLAPPED, OR OTHERWISE PHYSICALLY HURT BY YOUR PARTNER OR EX-PARTNER?: NO
WITHIN THE LAST YEAR, HAVE YOU BEEN HUMILIATED OR EMOTIONALLY ABUSED IN OTHER WAYS BY YOUR PARTNER OR EX-PARTNER?: NO
WITHIN THE LAST YEAR, HAVE TO BEEN RAPED OR FORCED TO HAVE ANY KIND OF SEXUAL ACTIVITY BY YOUR PARTNER OR EX-PARTNER?: NO

## 2023-02-08 ENCOUNTER — OFFICE VISIT (OUTPATIENT)
Dept: INTERNAL MEDICINE CLINIC | Age: 68
End: 2023-02-08
Payer: MEDICARE

## 2023-02-08 VITALS
OXYGEN SATURATION: 95 % | SYSTOLIC BLOOD PRESSURE: 130 MMHG | HEART RATE: 63 BPM | WEIGHT: 315 LBS | DIASTOLIC BLOOD PRESSURE: 80 MMHG | HEIGHT: 74 IN | BODY MASS INDEX: 40.43 KG/M2 | TEMPERATURE: 97.3 F

## 2023-02-08 DIAGNOSIS — K21.00 GASTROESOPHAGEAL REFLUX DISEASE WITH ESOPHAGITIS, UNSPECIFIED WHETHER HEMORRHAGE: ICD-10-CM

## 2023-02-08 DIAGNOSIS — I10 ESSENTIAL HYPERTENSION: Primary | ICD-10-CM

## 2023-02-08 DIAGNOSIS — E78.2 MIXED HYPERLIPIDEMIA: ICD-10-CM

## 2023-02-08 DIAGNOSIS — I48.0 PAROXYSMAL ATRIAL FIBRILLATION (HCC): ICD-10-CM

## 2023-02-08 PROCEDURE — 1123F ACP DISCUSS/DSCN MKR DOCD: CPT | Performed by: HOSPITALIST

## 2023-02-08 PROCEDURE — 3079F DIAST BP 80-89 MM HG: CPT | Performed by: HOSPITALIST

## 2023-02-08 PROCEDURE — G8427 DOCREV CUR MEDS BY ELIG CLIN: HCPCS | Performed by: HOSPITALIST

## 2023-02-08 PROCEDURE — 99214 OFFICE O/P EST MOD 30 MIN: CPT | Performed by: HOSPITALIST

## 2023-02-08 PROCEDURE — G8484 FLU IMMUNIZE NO ADMIN: HCPCS | Performed by: HOSPITALIST

## 2023-02-08 PROCEDURE — 1036F TOBACCO NON-USER: CPT | Performed by: HOSPITALIST

## 2023-02-08 PROCEDURE — 3017F COLORECTAL CA SCREEN DOC REV: CPT | Performed by: HOSPITALIST

## 2023-02-08 PROCEDURE — 3075F SYST BP GE 130 - 139MM HG: CPT | Performed by: HOSPITALIST

## 2023-02-08 PROCEDURE — G8417 CALC BMI ABV UP PARAM F/U: HCPCS | Performed by: HOSPITALIST

## 2023-02-08 RX ORDER — METOPROLOL TARTRATE 50 MG/1
50 TABLET, FILM COATED ORAL 2 TIMES DAILY
Qty: 180 TABLET | Refills: 1 | Status: SHIPPED | OUTPATIENT
Start: 2023-02-08 | End: 2023-08-07

## 2023-02-08 RX ORDER — ESOMEPRAZOLE MAGNESIUM 40 MG/1
CAPSULE, DELAYED RELEASE ORAL
Qty: 90 CAPSULE | Refills: 1 | Status: SHIPPED | OUTPATIENT
Start: 2023-02-08

## 2023-02-08 RX ORDER — FENOFIBRATE 160 MG/1
160 TABLET ORAL DAILY
Qty: 90 TABLET | Refills: 1 | Status: SHIPPED | OUTPATIENT
Start: 2023-02-08 | End: 2023-08-07

## 2023-02-08 RX ORDER — LISINOPRIL 20 MG/1
20 TABLET ORAL DAILY
Qty: 90 TABLET | Refills: 1 | Status: SHIPPED | OUTPATIENT
Start: 2023-02-08 | End: 2023-08-07

## 2023-02-08 SDOH — ECONOMIC STABILITY: INCOME INSECURITY: HOW HARD IS IT FOR YOU TO PAY FOR THE VERY BASICS LIKE FOOD, HOUSING, MEDICAL CARE, AND HEATING?: NOT HARD AT ALL

## 2023-02-08 SDOH — ECONOMIC STABILITY: FOOD INSECURITY: WITHIN THE PAST 12 MONTHS, YOU WORRIED THAT YOUR FOOD WOULD RUN OUT BEFORE YOU GOT MONEY TO BUY MORE.: NEVER TRUE

## 2023-02-08 SDOH — ECONOMIC STABILITY: HOUSING INSECURITY
IN THE LAST 12 MONTHS, WAS THERE A TIME WHEN YOU DID NOT HAVE A STEADY PLACE TO SLEEP OR SLEPT IN A SHELTER (INCLUDING NOW)?: NO

## 2023-02-08 SDOH — ECONOMIC STABILITY: FOOD INSECURITY: WITHIN THE PAST 12 MONTHS, THE FOOD YOU BOUGHT JUST DIDN'T LAST AND YOU DIDN'T HAVE MONEY TO GET MORE.: NEVER TRUE

## 2023-02-08 ASSESSMENT — PATIENT HEALTH QUESTIONNAIRE - PHQ9
SUM OF ALL RESPONSES TO PHQ QUESTIONS 1-9: 0
2. FEELING DOWN, DEPRESSED OR HOPELESS: 0
SUM OF ALL RESPONSES TO PHQ QUESTIONS 1-9: 0
SUM OF ALL RESPONSES TO PHQ QUESTIONS 1-9: 0
SUM OF ALL RESPONSES TO PHQ9 QUESTIONS 1 & 2: 0
SUM OF ALL RESPONSES TO PHQ QUESTIONS 1-9: 0
1. LITTLE INTEREST OR PLEASURE IN DOING THINGS: 0

## 2023-02-08 ASSESSMENT — ENCOUNTER SYMPTOMS
NAUSEA: 0
SINUS PAIN: 0
ABDOMINAL PAIN: 0
WHEEZING: 0
CHEST TIGHTNESS: 0
ABDOMINAL DISTENTION: 0
VOICE CHANGE: 0
SINUS PRESSURE: 0
BACK PAIN: 0
VOMITING: 0
SORE THROAT: 0
SHORTNESS OF BREATH: 0
DIARRHEA: 0
BLOOD IN STOOL: 0
TROUBLE SWALLOWING: 0
COUGH: 0
CONSTIPATION: 0

## 2023-02-08 NOTE — PROGRESS NOTES
[facility] Internal Medicine  Establish care visit   2023    Elmer Reyes IV (:  1955) is a 74 y.o. male, here to establish care. Chief Complaint   Patient presents with    New Patient        Patient Active Problem List   Diagnosis    Knee pain, left    Benign neoplasm of skin    Other seborrheic keratosis    Essential hypertension    Sleep apnea    Palpitations    Reflux esophagitis    Mixed hyperlipidemia    BPH (benign prostatic hyperplasia)    Morbid obesity with BMI of 50.0-59.9, adult (HCC)    Arthritis of right knee    Nonrheumatic aortic valve stenosis    Nonrheumatic aortic valve insufficiency    Hyperglycemia    Ascending aortic aneurysm    COVID-19    Wrist pain, acute, left       HPI  The patient is a 74year old morbidly obese gentleman with past medical history significant for mixed hyperlipidemia, GERD, coronary artery disease, aortic valve stenosis and bioprosthetic valve replacement 1(2021). He had KRYSTAL clip placed at the time of his surgery. He has obstructive sleep apnea on CPAP (2014), hypertension and obesity with BMI 50. He has a history of a right hernia repair as well as a revision. He has had bilateral knee replacements. He is here to establish care with me. Hypertension  This is a chronic problem. The current episode started more than 1 year ago. The problem is unchanged. The problem is controlled. Associated symptoms include palpitations. Pertinent negatives include no shortness of breath. There are no associated agents to hypertension. Risk factors for coronary artery disease include dyslipidemia, male gender and obesity. Past treatments include ACE inhibitors, beta blockers and lifestyle changes. The current treatment provides significant improvement. There are no compliance problems. Hyperlipidemia  This is a chronic problem. The current episode started more than 1 year ago. The problem is controlled.  Recent lipid tests were reviewed and are normal. There are no known factors aggravating his hyperlipidemia. Pertinent negatives include no shortness of breath. Current antihyperlipidemic treatment includes diet change, exercise, fibric acid derivatives and statins. The current treatment provides significant improvement of lipids. There are no compliance problems. Risk factors for coronary artery disease include dyslipidemia, hypertension, male sex and obesity. Atrial Fibrillation  Patient [place] with [place]. Kendrick Warner [place] [facility] [place] He is on metoprolol for this as well as digoxin. He is not anticoagulated as he has had a atrial clip placed. Cardiac pacemaker placed 11/19/2021  GERD  The patient has well-controlled symptoms on Nexium. He has had an EGD by Dr. Ezekiel Meehan on 12/6/2022 without Vidales's esophagus. He is to have repeat EGD in 3 years. Screening  -Patient last had colonoscopy 12/6/2022. He is to have a repeat colonoscopy in 3 years    URI:  Patient had [place] in December and was treated with Paxlovid. He recovered reasonably well but then had an episode of bronchitis, and we prescribed a Z-Hany as well as clindamycin for him. He is getting over this, but still has some sensation of fullness in the left ear. ROS  Review of Systems   Constitutional:  Negative for activity change, appetite change, chills, diaphoresis, fatigue, fever and unexpected weight change. HENT:  Negative for sinus pressure, sinus pain, sore throat, trouble swallowing and voice change. Eyes:  Negative for visual disturbance. Respiratory:  Negative for cough, chest tightness, shortness of breath and wheezing. Cardiovascular:  Positive for leg swelling (Chronic, stable. ). Negative for chest pain and palpitations. Gastrointestinal:  Negative for abdominal distention, abdominal pain, blood in stool, constipation, diarrhea, nausea and vomiting. Endocrine: Negative for polydipsia and polyphagia.    Genitourinary:  Negative for decreased urine volume, difficulty urinating, dysuria and urgency. Musculoskeletal:  Negative for back pain, gait problem, joint swelling and myalgias. Neurological:  Negative for dizziness, seizures, syncope, light-headedness and headaches. Psychiatric/Behavioral:  Positive for sleep disturbance (Uses CPAP regularly, uses tylenol pm.). Negative for agitation, behavioral problems, confusion and suicidal ideas. HISTORIES  Current Outpatient Medications on File Prior to Visit   Medication Sig Dispense Refill    DIGOXIN PO 0.25 mg daily      Promethazine-Phenylephrine (PHENERGAN-VC) 6.25-5 MG/5ML syrup Take 5 mLs by mouth in the morning and 5 mLs at noon and 5 mLs in the evening and 5 mLs before bedtime. 180 mL 0    rosuvastatin (CRESTOR) 20 MG tablet TAKE 1 TABLET BY MOUTH EVERY NIGHT 90 tablet 1    aspirin 81 MG EC tablet Take 81 mg by mouth Take one tablet once a day      furosemide (LASIX) 40 MG tablet 40 mg as needed (edema)      triamcinolone (KENALOG) 0.1 % cream       potassium chloride (KLOR-CON) 10 MEQ extended release tablet Take 10 mEq by mouth daily as needed (edema)      clindamycin (CLEOCIN) 300 MG capsule Take 300 mg by mouth 3 times daily Before dental procedure       Multiple Vitamins-Minerals (MULTIVITAMIN ADULTS 50+ PO) Take by mouth daily      Cholecalciferol (VITAMIN D) 1000 UNIT CAPS Take 2,000 Units by mouth daily        No current facility-administered medications on file prior to visit. Allergies   Allergen Reactions    Chocolate Other (See Comments)     Cold like symptoms     Ciprofloxacin      ?  Cardiac side effects     Dairy Digestive [Tilactase]     Other      dairy    Penicillins     Food Other (See Comments)     coldlike symptoms       Past Medical History:   Diagnosis Date    Apnea     Hyperlipidemia     Kidney stone on right side 1/2016    passed ok     Syncope 10/2004    Unspecified sleep apnea     using C-PAP       Patient Active Problem List   Diagnosis    Knee pain, left    Benign neoplasm of skin    Other seborrheic keratosis    Essential hypertension    Sleep apnea    Palpitations    Reflux esophagitis    Mixed hyperlipidemia    BPH (benign prostatic hyperplasia)    Morbid obesity with BMI of 50.0-59.9, adult (HCC)    Arthritis of right knee    Nonrheumatic aortic valve stenosis    Nonrheumatic aortic valve insufficiency    Hyperglycemia    Ascending aortic aneurysm    COVID-19    Wrist pain, acute, left       Past Surgical History:   Procedure Laterality Date    COLONOSCOPY  01/26/2004    POLYP    COLONOSCOPY  12/13/2016    polyps Dr. Gricel Harden repeat in 2020    COLONOSCOPY N/A 12/6/2022    COLONOSCOPY POLYPECTOMY SNARE/COLD BIOPSY performed by Barbara Pierre MD at [address] [place] [address], [address] [place] [address],[facility] Floor      left knee    JOINT REPLACEMENT Left 11/19/13    L TKR Dr Randy Coronado Right 11/2014    R TKR Dr Ac Cruz  03/23/2018    Dr. Barbara Pierre repeat in 2021    UPPER GASTROINTESTINAL ENDOSCOPY N/A 12/6/2022    EGD BIOPSY performed by Barbara Pierre MD at [address] History     Socioeconomic History    Marital status:      Spouse name: Not on file    Number of children: Not on file    Years of education: Not on file    Highest education level: Not on file   Occupational History    Not on file   Tobacco Use    Smoking status: Never    Smokeless tobacco: Never   Vaping Use    Vaping Use: Never used   Substance and Sexual Activity    Alcohol use: Yes     Comment: occ    Drug use: No    Sexual activity: Not on file   Other Topics Concern    Not on file   Social History Narrative    ** Merged History Encounter **          Social Determinants of Health     Financial Resource Strain: Low Risk     Difficulty of Paying Living Expenses: Not hard at all   Food Insecurity: No Food Insecurity    Worried About [address] [facility] in the Last Year: Never true    [address] in the Last Year: Never true   Transportation Needs: Unknown    Lack of Transportation (Medical): Not on file    Lack of Transportation (Non-Medical): No   Physical Activity: Insufficiently Active    Days of Exercise per Week: 2 days    Minutes of Exercise per Session: 20 min   Stress: Not on file   Social Connections: Not on file   Intimate Partner Violence: Not At Risk    Fear of Current or Ex-Partner: No    Emotionally Abused: No    Physically Abused: No    Sexually Abused: No   Housing Stability: Unknown    Unable to Pay for Housing in the Last Year: Not on file    Number of Places Lived in the Last Year: Not on file    Unstable Housing in the Last Year: No        Family History   Problem Relation Age of Onset    Cancer Mother         ovarian    Other Father         Pulmonary Fibrosis       PE  Vitals:    02/08/23 1252   BP: (!) 132/90   Site: Left Lower Arm   Position: Sitting   Pulse: 63   Temp: 97.3 °F (36.3 °C)   SpO2: 95%   Weight: (!) 392 lb 9.6 oz (178.1 kg)   Height: 6' 2\" (1.88 m)     Estimated body mass index is 50.41 kg/m² as calculated from the following:    Height as of this encounter: 6' 2\" (1.88 m). Weight as of this encounter: 392 lb 9.6 oz (178.1 kg). Physical Exam  Vitals reviewed. Constitutional:       General: He is not in acute distress. Appearance: Normal appearance. He is obese. HENT:      Head: Normocephalic and atraumatic. Mouth/Throat:      Pharynx: Oropharynx is clear. Eyes:      Conjunctiva/sclera: Conjunctivae normal.      Pupils: Pupils are equal, round, and reactive to light. Cardiovascular:      Rate and Rhythm: Normal rate and regular rhythm. Pulses: Normal pulses. Heart sounds: Normal heart sounds. Pulmonary:      Effort: Pulmonary effort is normal. No respiratory distress. Breath sounds: Normal breath sounds. No wheezing or rales. Abdominal:      Palpations: Abdomen is soft.       Tenderness: There is no abdominal tenderness. There is no rebound. Musculoskeletal:         General: No signs of injury. Normal range of motion. Cervical back: Normal range of motion and neck supple. Skin:     General: Skin is warm and dry. Coloration: Skin is not jaundiced. Neurological:      General: No focal deficit present. Mental Status: He is alert and oriented to person, place, and time. Psychiatric:         Behavior: Behavior normal.         Thought Content:  Thought content normal.         Judgment: Judgment normal.       Immunization History   Administered Date(s) Administered    COVID-19, MODERNA BLUE border, Primary or Immunocompromised, (age 12y+), IM, 100 mcg/0.5mL 03/08/2021, 04/05/2021, 01/05/2022    Hepatitis A/Hepatitis B (Twinrix) 04/30/2019, 05/28/2019, 12/11/2019    INFLUENZA, INTRADERMAL, QUADRIVALENT, PRESERVATIVE FREE 10/14/2016, 10/25/2017    Influenza 10/18/2010, 11/08/2011    Influenza Vaccine, unspecified formulation 10/14/2016    Influenza Virus Vaccine 12/11/2015, 10/14/2016    Influenza Whole 12/11/2015    Influenza, FLUAD, (age 72 y+), Adjuvanted, 0.5mL 09/28/2022    Influenza, FLUARIX, FLULAVAL, FLUZONE (age 10 mo+) AND AFLURIA, (age 1 y+), PF, 0.5mL 10/08/2018, 11/01/2019, 11/23/2020    Influenza, FLUCELVAX, (age 10 mo+), MDCK, PF, 0.5mL 09/22/2021    Influenza, High Dose (Fluzone 65 yrs and older) 11/12/2014    Influenza, Intradermal, Preservative free 11/30/2012, 10/21/2013    Pneumococcal Polysaccharide (Aqiukoryc01) 09/28/2022    Td, unspecified formulation 12/02/2003    Tdap (Boostrix, Adacel) 06/12/2015       Health Maintenance   Topic Date Due    Shingles vaccine (1 of 2) Never done    COVID-19 Vaccine (4 - Booster for Moderna series) 03/02/2022    A1C test (Diabetic or Prediabetic)  08/03/2023    Lipids  08/03/2023    Annual Wellness Visit (AWV)  08/09/2023    Depression Screen  01/17/2024    DTaP/Tdap/Td vaccine (2 - Td or Tdap) 06/12/2025    Colorectal Cancer Screen  12/06/2025    Flu vaccine  Completed    Pneumococcal 65+ years Vaccine  Completed    Hepatitis C screen  Completed    HIV screen  Completed    Hepatitis A vaccine  Aged Out    Hib vaccine  Aged Out    Meningococcal (ACWY) vaccine  Aged Out       PSH, PMH, SH and FH reviewed and noted. Recent and past labs, tests and consults also reviewed. Recent or new meds also reviewed. ASSESSMENT/ PLAN:  1. Essential hypertension  - Well controlled. - lisinopril (PRINIVIL;ZESTRIL) 20 MG tablet; Take 1 tablet by mouth daily  Dispense: 90 tablet; Refill: 1  - CBC; Future  - Comprehensive Metabolic Panel; Future    2. Mixed hyperlipidemia  -Reassessing.   - fenofibrate (TRIGLIDE) 160 MG tablet; Take 1 tablet by mouth daily Take one tablet once a day  Dispense: 90 tablet; Refill: 1  - Lipid, Fasting; Future  - Comprehensive Metabolic Panel; Future    3. Paroxysmal atrial fibrillation (HCC)  -Continue of digoxin and metoprolol with f/u Dr. Niraj Santillan.    - metoprolol tartrate (LOPRESSOR) 50 MG tablet; Take 1 tablet by mouth 2 times daily Take twice a day  Dispense: 180 tablet; Refill: 1    4. Gastroesophageal reflux disease with esophagitis, unspecified whether hemorrhage  -Well controlled. - esomeprazole (NEXIUM) 40 MG delayed release capsule; TAKE ONE CAPSULE BY MOUTH EVERY MORNING BEFORE BREAKFAST  Dispense: 90 capsule;  Refill: 1     Orders Placed This Encounter   Procedures    CBC    Lipid, Fasting    Comprehensive Metabolic Panel     Orders Placed This Encounter   Medications    esomeprazole (NEXIUM) 40 MG delayed release capsule     Sig: TAKE ONE CAPSULE BY MOUTH EVERY MORNING BEFORE BREAKFAST     Dispense:  90 capsule     Refill:  1    metoprolol tartrate (LOPRESSOR) 50 MG tablet     Sig: Take 1 tablet by mouth 2 times daily Take twice a day     Dispense:  180 tablet     Refill:  1    fenofibrate (TRIGLIDE) 160 MG tablet     Sig: Take 1 tablet by mouth daily Take one tablet once a day     Dispense:  90 tablet Refill:  1    lisinopril (PRINIVIL;ZESTRIL) 20 MG tablet     Sig: Take 1 tablet by mouth daily     Dispense:  90 tablet     Refill:  1      Medications Discontinued During This Encounter   Medication Reason    digoxin (LANOXIN) 0.25 MG/ML injection     lisinopril (PRINIVIL;ZESTRIL) 20 MG tablet REORDER    esomeprazole (NEXIUM) 40 MG delayed release capsule REORDER    fenofibrate (TRIGLIDE) 160 MG tablet REORDER    metoprolol tartrate (LOPRESSOR) 25 MG tablet         Return in about 3 months (around 5/8/2023). Jag Stevenson MD    This dictation was generated by voice recognition computer software. Although all attempts are made to edit the dictation for accuracy, there may be errors in the transcription that are not intended.

## 2023-02-15 ENCOUNTER — TELEPHONE (OUTPATIENT)
Dept: INTERNAL MEDICINE CLINIC | Age: 68
End: 2023-02-15

## 2023-02-15 NOTE — TELEPHONE ENCOUNTER
The patient needs a PA on:   esomeprazole (NEXIUM) 40 MG delayed release capsule  Sig: TAKE ONE CAPSULE BY MOUTH Zachary Pickering    Mr. Jonathan Burnham has Gastroesophageal reflux disease with esophagitis, unspecified whether hemorrhage (K21.00). He had an EGD without Vidales's Esophagus.

## 2023-02-16 NOTE — TELEPHONE ENCOUNTER
PA for Esomeprazole Magnesium 40MG dr capsules resubmitted to [employer] via [facility]MM Key: [id number]  STATUS: PENDING

## 2023-05-03 RX ORDER — ROSUVASTATIN CALCIUM 20 MG/1
TABLET, COATED ORAL
Qty: 90 TABLET | Refills: 1 | Status: SHIPPED | OUTPATIENT
Start: 2023-05-03

## 2023-07-29 DIAGNOSIS — I10 ESSENTIAL HYPERTENSION: ICD-10-CM

## 2023-07-29 DIAGNOSIS — E78.2 MIXED HYPERLIPIDEMIA: ICD-10-CM

## 2023-07-30 DIAGNOSIS — I48.0 PAROXYSMAL ATRIAL FIBRILLATION (HCC): ICD-10-CM

## 2023-07-31 RX ORDER — METOPROLOL TARTRATE 50 MG/1
TABLET, FILM COATED ORAL
Qty: 180 TABLET | Refills: 1 | Status: SHIPPED | OUTPATIENT
Start: 2023-07-31

## 2023-07-31 RX ORDER — FENOFIBRATE 160 MG/1
TABLET ORAL
Qty: 90 TABLET | Refills: 1 | Status: SHIPPED | OUTPATIENT
Start: 2023-07-31

## 2023-07-31 RX ORDER — LISINOPRIL 20 MG/1
20 TABLET ORAL DAILY
Qty: 90 TABLET | Refills: 1 | Status: SHIPPED | OUTPATIENT
Start: 2023-07-31 | End: 2024-01-27

## 2023-10-11 LAB
AVERAGE GLUCOSE: NORMAL
HBA1C MFR BLD: 6.1 %

## 2023-11-17 RX ORDER — ROSUVASTATIN CALCIUM 20 MG/1
TABLET, COATED ORAL
Qty: 90 TABLET | Refills: 1 | OUTPATIENT
Start: 2023-11-17

## 2024-01-25 DIAGNOSIS — I48.0 PAROXYSMAL ATRIAL FIBRILLATION (HCC): ICD-10-CM

## 2024-01-25 RX ORDER — METOPROLOL TARTRATE 50 MG/1
TABLET, FILM COATED ORAL
Qty: 180 TABLET | Refills: 1 | OUTPATIENT
Start: 2024-01-25

## 2024-11-17 ENCOUNTER — HOSPITAL ENCOUNTER (INPATIENT)
Age: 69
LOS: 3 days | Discharge: HOME | DRG: 659 | End: 2024-11-20
Attending: STUDENT IN AN ORGANIZED HEALTH CARE EDUCATION/TRAINING PROGRAM | Admitting: STUDENT IN AN ORGANIZED HEALTH CARE EDUCATION/TRAINING PROGRAM
Payer: MEDICARE

## 2024-11-17 ENCOUNTER — APPOINTMENT (OUTPATIENT)
Dept: CT IMAGING | Age: 69
DRG: 659 | End: 2024-11-17
Payer: MEDICARE

## 2024-11-17 DIAGNOSIS — I10 ELEVATED BLOOD PRESSURE READING WITH DIAGNOSIS OF HYPERTENSION: ICD-10-CM

## 2024-11-17 DIAGNOSIS — I48.0 PAROXYSMAL ATRIAL FIBRILLATION (HCC): ICD-10-CM

## 2024-11-17 DIAGNOSIS — N17.9 AKI (ACUTE KIDNEY INJURY) (HCC): ICD-10-CM

## 2024-11-17 DIAGNOSIS — N20.0 KIDNEY STONE: ICD-10-CM

## 2024-11-17 DIAGNOSIS — N30.01 ACUTE CYSTITIS WITH HEMATURIA: Primary | ICD-10-CM

## 2024-11-17 PROBLEM — N13.9 OBSTRUCTIVE UROPATHY: Status: ACTIVE | Noted: 2024-11-17

## 2024-11-17 LAB
ALBUMIN SERPL BCG-MCNC: 4.1 G/DL (ref 3.5–5.1)
ALP SERPL-CCNC: 47 U/L (ref 38–126)
ALT SERPL W/O P-5'-P-CCNC: 20 U/L (ref 11–66)
ANION GAP SERPL CALC-SCNC: 14 MEQ/L (ref 8–16)
ANION GAP SERPL CALC-SCNC: 16 MEQ/L (ref 8–16)
AST SERPL-CCNC: 31 U/L (ref 5–40)
BACTERIA URNS QL MICRO: ABNORMAL /HPF
BASOPHILS ABSOLUTE: 0 THOU/MM3 (ref 0–0.1)
BASOPHILS NFR BLD AUTO: 0.3 %
BILIRUB SERPL-MCNC: 0.5 MG/DL (ref 0.3–1.2)
BILIRUB UR QL STRIP.AUTO: NEGATIVE
BUN SERPL-MCNC: 23 MG/DL (ref 7–22)
BUN SERPL-MCNC: 35 MG/DL (ref 7–22)
CALCIUM SERPL-MCNC: 8.7 MG/DL (ref 8.5–10.5)
CALCIUM SERPL-MCNC: 9.1 MG/DL (ref 8.5–10.5)
CASTS #/AREA URNS LPF: ABNORMAL /LPF
CASTS 2: ABNORMAL /LPF
CHARACTER UR: ABNORMAL
CHLORIDE SERPL-SCNC: 104 MEQ/L (ref 98–111)
CHLORIDE SERPL-SCNC: 105 MEQ/L (ref 98–111)
CO2 SERPL-SCNC: 19 MEQ/L (ref 23–33)
CO2 SERPL-SCNC: 19 MEQ/L (ref 23–33)
COLOR, UA: YELLOW
CREAT SERPL-MCNC: 1.6 MG/DL (ref 0.4–1.2)
CREAT SERPL-MCNC: 3.5 MG/DL (ref 0.4–1.2)
CREAT UR-MCNC: 93.2 MG/DL
CRYSTALS URNS MICRO: ABNORMAL
DEPRECATED RDW RBC AUTO: 44.8 FL (ref 35–45)
DIGOXIN SERPL-MCNC: 1.6 NG/ML (ref 0.5–2)
EKG ATRIAL RATE: 78 BPM
EKG P-R INTERVAL: 192 MS
EKG Q-T INTERVAL: 414 MS
EKG QRS DURATION: 172 MS
EKG QTC CALCULATION (BAZETT): 471 MS
EKG R AXIS: -107 DEGREES
EKG T AXIS: 36 DEGREES
EKG VENTRICULAR RATE: 78 BPM
EOSINOPHIL NFR BLD AUTO: 0.3 %
EOSINOPHILS ABSOLUTE: 0 THOU/MM3 (ref 0–0.4)
EPITHELIAL CELLS, UA: ABNORMAL /HPF
ERYTHROCYTE [DISTWIDTH] IN BLOOD BY AUTOMATED COUNT: 13.2 % (ref 11.5–14.5)
GFR SERPL CREATININE-BSD FRML MDRD: 18 ML/MIN/1.73M2
GFR SERPL CREATININE-BSD FRML MDRD: 47 ML/MIN/1.73M2
GLUCOSE BLD STRIP.AUTO-MCNC: 121 MG/DL (ref 70–108)
GLUCOSE BLD STRIP.AUTO-MCNC: 135 MG/DL (ref 70–108)
GLUCOSE BLD STRIP.AUTO-MCNC: 141 MG/DL (ref 70–108)
GLUCOSE BLD STRIP.AUTO-MCNC: 152 MG/DL (ref 70–108)
GLUCOSE SERPL-MCNC: 138 MG/DL (ref 70–108)
GLUCOSE SERPL-MCNC: 159 MG/DL (ref 70–108)
GLUCOSE UR QL STRIP.AUTO: NEGATIVE MG/DL
HCT VFR BLD AUTO: 48.9 % (ref 42–52)
HGB BLD-MCNC: 17 GM/DL (ref 14–18)
HGB UR QL STRIP.AUTO: ABNORMAL
IMM GRANULOCYTES # BLD AUTO: 0.13 THOU/MM3 (ref 0–0.07)
IMM GRANULOCYTES NFR BLD AUTO: 0.8 %
KETONES UR QL STRIP.AUTO: NEGATIVE
LACTATE SERPL-SCNC: 3.1 MMOL/L (ref 0.5–2)
LYMPHOCYTES ABSOLUTE: 1.4 THOU/MM3 (ref 1–4.8)
LYMPHOCYTES NFR BLD AUTO: 8.6 %
MAGNESIUM SERPL-MCNC: 1.8 MG/DL (ref 1.6–2.4)
MCH RBC QN AUTO: 32.8 PG (ref 26–33)
MCHC RBC AUTO-ENTMCNC: 34.8 GM/DL (ref 32.2–35.5)
MCV RBC AUTO: 94.4 FL (ref 80–94)
MISCELLANEOUS 2: ABNORMAL
MONOCYTES ABSOLUTE: 1.2 THOU/MM3 (ref 0.4–1.3)
MONOCYTES NFR BLD AUTO: 7.3 %
NEUTROPHILS ABSOLUTE: 13.2 THOU/MM3 (ref 1.8–7.7)
NEUTROPHILS NFR BLD AUTO: 82.7 %
NITRITE UR QL STRIP: NEGATIVE
NRBC BLD AUTO-RTO: 0 /100 WBC
OSMOLALITY SERPL CALC.SUM OF ELEC: 284.6 MOSMOL/KG (ref 275–300)
PH UR STRIP.AUTO: 7 [PH] (ref 5–9)
PLATELET # BLD AUTO: 191 THOU/MM3 (ref 130–400)
PMV BLD AUTO: 10.9 FL (ref 9.4–12.4)
POTASSIUM SERPL-SCNC: 3.9 MEQ/L (ref 3.5–5.2)
POTASSIUM SERPL-SCNC: 4.4 MEQ/L (ref 3.5–5.2)
PROT SERPL-MCNC: 7 G/DL (ref 6.1–8)
PROT UR STRIP.AUTO-MCNC: 100 MG/DL
RBC # BLD AUTO: 5.18 MILL/MM3 (ref 4.7–6.1)
RBC URINE: ABNORMAL /HPF
REASON FOR REJECTION: NORMAL
REJECTED TEST: NORMAL
RENAL EPI CELLS #/AREA URNS HPF: ABNORMAL /[HPF]
SCAN OF BLOOD SMEAR: NORMAL
SODIUM SERPL-SCNC: 138 MEQ/L (ref 135–145)
SODIUM SERPL-SCNC: 139 MEQ/L (ref 135–145)
SODIUM UR-SCNC: 120 MEQ/L
SP GR UR REFRACT.AUTO: 1.01 (ref 1–1.03)
UROBILINOGEN, URINE: 0.2 EU/DL (ref 0–1)
UUN 24H UR-MCNC: 376 MG/DL
WBC # BLD AUTO: 16 THOU/MM3 (ref 4.8–10.8)
WBC #/AREA URNS HPF: > 100 /HPF
WBC #/AREA URNS HPF: ABNORMAL /[HPF]
YEAST LIKE FUNGI URNS QL MICRO: ABNORMAL

## 2024-11-17 PROCEDURE — 6370000000 HC RX 637 (ALT 250 FOR IP)

## 2024-11-17 PROCEDURE — 74176 CT ABD & PELVIS W/O CONTRAST: CPT

## 2024-11-17 PROCEDURE — 82948 REAGENT STRIP/BLOOD GLUCOSE: CPT

## 2024-11-17 PROCEDURE — 99221 1ST HOSP IP/OBS SF/LOW 40: CPT

## 2024-11-17 PROCEDURE — 2580000003 HC RX 258

## 2024-11-17 PROCEDURE — 85025 COMPLETE CBC W/AUTO DIFF WBC: CPT

## 2024-11-17 PROCEDURE — 81001 URINALYSIS AUTO W/SCOPE: CPT

## 2024-11-17 PROCEDURE — 87086 URINE CULTURE/COLONY COUNT: CPT

## 2024-11-17 PROCEDURE — 99285 EMERGENCY DEPT VISIT HI MDM: CPT

## 2024-11-17 PROCEDURE — 87077 CULTURE AEROBIC IDENTIFY: CPT

## 2024-11-17 PROCEDURE — 80162 ASSAY OF DIGOXIN TOTAL: CPT

## 2024-11-17 PROCEDURE — 99223 1ST HOSP IP/OBS HIGH 75: CPT | Performed by: INTERNAL MEDICINE

## 2024-11-17 PROCEDURE — 96375 TX/PRO/DX INJ NEW DRUG ADDON: CPT

## 2024-11-17 PROCEDURE — 93005 ELECTROCARDIOGRAM TRACING: CPT | Performed by: STUDENT IN AN ORGANIZED HEALTH CARE EDUCATION/TRAINING PROGRAM

## 2024-11-17 PROCEDURE — 82570 ASSAY OF URINE CREATININE: CPT

## 2024-11-17 PROCEDURE — 84300 ASSAY OF URINE SODIUM: CPT

## 2024-11-17 PROCEDURE — 83605 ASSAY OF LACTIC ACID: CPT

## 2024-11-17 PROCEDURE — 6360000002 HC RX W HCPCS: Performed by: PHYSICIAN ASSISTANT

## 2024-11-17 PROCEDURE — 80053 COMPREHEN METABOLIC PANEL: CPT

## 2024-11-17 PROCEDURE — 51798 US URINE CAPACITY MEASURE: CPT

## 2024-11-17 PROCEDURE — 36415 COLL VENOUS BLD VENIPUNCTURE: CPT

## 2024-11-17 PROCEDURE — 84540 ASSAY OF URINE/UREA-N: CPT

## 2024-11-17 PROCEDURE — 2060000000 HC ICU INTERMEDIATE R&B

## 2024-11-17 PROCEDURE — 2580000003 HC RX 258: Performed by: PHYSICIAN ASSISTANT

## 2024-11-17 PROCEDURE — 87186 SC STD MICRODIL/AGAR DIL: CPT

## 2024-11-17 PROCEDURE — 96374 THER/PROPH/DIAG INJ IV PUSH: CPT

## 2024-11-17 PROCEDURE — 6360000002 HC RX W HCPCS

## 2024-11-17 PROCEDURE — 83735 ASSAY OF MAGNESIUM: CPT

## 2024-11-17 PROCEDURE — 87040 BLOOD CULTURE FOR BACTERIA: CPT

## 2024-11-17 PROCEDURE — 93010 ELECTROCARDIOGRAM REPORT: CPT | Performed by: INTERNAL MEDICINE

## 2024-11-17 RX ORDER — ASPIRIN 81 MG/1
81 TABLET ORAL ONCE
Status: DISCONTINUED | OUTPATIENT
Start: 2024-11-17 | End: 2024-11-20 | Stop reason: HOSPADM

## 2024-11-17 RX ORDER — ACETAMINOPHEN 325 MG/1
650 TABLET ORAL EVERY 6 HOURS PRN
Status: DISCONTINUED | OUTPATIENT
Start: 2024-11-17 | End: 2024-11-17

## 2024-11-17 RX ORDER — INSULIN LISPRO 100 [IU]/ML
0-4 INJECTION, SOLUTION INTRAVENOUS; SUBCUTANEOUS
Status: DISCONTINUED | OUTPATIENT
Start: 2024-11-17 | End: 2024-11-20 | Stop reason: HOSPADM

## 2024-11-17 RX ORDER — 0.9 % SODIUM CHLORIDE 0.9 %
1500 INTRAVENOUS SOLUTION INTRAVENOUS ONCE
Status: COMPLETED | OUTPATIENT
Start: 2024-11-17 | End: 2024-11-17

## 2024-11-17 RX ORDER — 0.9 % SODIUM CHLORIDE 0.9 %
1000 INTRAVENOUS SOLUTION INTRAVENOUS ONCE
Status: COMPLETED | OUTPATIENT
Start: 2024-11-17 | End: 2024-11-17

## 2024-11-17 RX ORDER — FENOFIBRATE 160 MG/1
160 TABLET ORAL DAILY
Status: DISCONTINUED | OUTPATIENT
Start: 2024-11-17 | End: 2024-11-20 | Stop reason: HOSPADM

## 2024-11-17 RX ORDER — SODIUM CHLORIDE 9 MG/ML
INJECTION, SOLUTION INTRAVENOUS PRN
Status: DISCONTINUED | OUTPATIENT
Start: 2024-11-17 | End: 2024-11-17

## 2024-11-17 RX ORDER — ACETAMINOPHEN 650 MG/1
650 SUPPOSITORY RECTAL EVERY 6 HOURS PRN
Status: DISCONTINUED | OUTPATIENT
Start: 2024-11-17 | End: 2024-11-17

## 2024-11-17 RX ORDER — MAGNESIUM SULFATE IN WATER 40 MG/ML
2000 INJECTION, SOLUTION INTRAVENOUS PRN
Status: DISCONTINUED | OUTPATIENT
Start: 2024-11-17 | End: 2024-11-20 | Stop reason: HOSPADM

## 2024-11-17 RX ORDER — METOPROLOL TARTRATE 50 MG
50 TABLET ORAL 2 TIMES DAILY
Status: DISCONTINUED | OUTPATIENT
Start: 2024-11-17 | End: 2024-11-20 | Stop reason: HOSPADM

## 2024-11-17 RX ORDER — VITAMIN B COMPLEX
2000 TABLET ORAL DAILY
Status: DISCONTINUED | OUTPATIENT
Start: 2024-11-17 | End: 2024-11-20 | Stop reason: HOSPADM

## 2024-11-17 RX ORDER — ACETAMINOPHEN 325 MG/1
650 TABLET ORAL EVERY 4 HOURS PRN
Status: DISCONTINUED | OUTPATIENT
Start: 2024-11-17 | End: 2024-11-20 | Stop reason: HOSPADM

## 2024-11-17 RX ORDER — HALOPERIDOL 5 MG/ML
2 INJECTION INTRAMUSCULAR
Status: ACTIVE | OUTPATIENT
Start: 2024-11-17 | End: 2024-11-18

## 2024-11-17 RX ORDER — MORPHINE SULFATE 2 MG/ML
2 INJECTION, SOLUTION INTRAMUSCULAR; INTRAVENOUS
Status: DISCONTINUED | OUTPATIENT
Start: 2024-11-17 | End: 2024-11-20 | Stop reason: HOSPADM

## 2024-11-17 RX ORDER — POLYETHYLENE GLYCOL 3350 17 G/17G
17 POWDER, FOR SOLUTION ORAL DAILY PRN
Status: DISCONTINUED | OUTPATIENT
Start: 2024-11-17 | End: 2024-11-20 | Stop reason: HOSPADM

## 2024-11-17 RX ORDER — POTASSIUM CHLORIDE 7.45 MG/ML
10 INJECTION INTRAVENOUS PRN
Status: DISCONTINUED | OUTPATIENT
Start: 2024-11-17 | End: 2024-11-20 | Stop reason: HOSPADM

## 2024-11-17 RX ORDER — GLUCAGON 1 MG/ML
1 KIT INJECTION PRN
Status: DISCONTINUED | OUTPATIENT
Start: 2024-11-17 | End: 2024-11-20 | Stop reason: HOSPADM

## 2024-11-17 RX ORDER — POTASSIUM CHLORIDE 1500 MG/1
40 TABLET, EXTENDED RELEASE ORAL PRN
Status: DISCONTINUED | OUTPATIENT
Start: 2024-11-17 | End: 2024-11-20 | Stop reason: HOSPADM

## 2024-11-17 RX ORDER — KETOROLAC TROMETHAMINE 30 MG/ML
15 INJECTION, SOLUTION INTRAMUSCULAR; INTRAVENOUS ONCE
Status: COMPLETED | OUTPATIENT
Start: 2024-11-17 | End: 2024-11-17

## 2024-11-17 RX ORDER — ACETAMINOPHEN 650 MG/1
650 SUPPOSITORY RECTAL EVERY 6 HOURS PRN
Status: DISCONTINUED | OUTPATIENT
Start: 2024-11-17 | End: 2024-11-20 | Stop reason: HOSPADM

## 2024-11-17 RX ORDER — SODIUM CHLORIDE 0.9 % (FLUSH) 0.9 %
5-40 SYRINGE (ML) INJECTION PRN
Status: DISCONTINUED | OUTPATIENT
Start: 2024-11-17 | End: 2024-11-20 | Stop reason: HOSPADM

## 2024-11-17 RX ORDER — ONDANSETRON 4 MG/1
4 TABLET, ORALLY DISINTEGRATING ORAL EVERY 8 HOURS PRN
Status: DISCONTINUED | OUTPATIENT
Start: 2024-11-17 | End: 2024-11-20 | Stop reason: HOSPADM

## 2024-11-17 RX ORDER — PANTOPRAZOLE SODIUM 40 MG/1
40 TABLET, DELAYED RELEASE ORAL
Status: DISCONTINUED | OUTPATIENT
Start: 2024-11-17 | End: 2024-11-20 | Stop reason: HOSPADM

## 2024-11-17 RX ORDER — DEXTROSE MONOHYDRATE 100 MG/ML
INJECTION, SOLUTION INTRAVENOUS CONTINUOUS PRN
Status: DISCONTINUED | OUTPATIENT
Start: 2024-11-17 | End: 2024-11-20 | Stop reason: HOSPADM

## 2024-11-17 RX ORDER — OXYCODONE HYDROCHLORIDE 5 MG/1
5 TABLET ORAL EVERY 4 HOURS PRN
Status: DISCONTINUED | OUTPATIENT
Start: 2024-11-17 | End: 2024-11-20 | Stop reason: HOSPADM

## 2024-11-17 RX ORDER — ONDANSETRON 2 MG/ML
4 INJECTION INTRAMUSCULAR; INTRAVENOUS EVERY 6 HOURS PRN
Status: DISCONTINUED | OUTPATIENT
Start: 2024-11-17 | End: 2024-11-20 | Stop reason: HOSPADM

## 2024-11-17 RX ORDER — 0.9 % SODIUM CHLORIDE 0.9 %
1000 INTRAVENOUS SOLUTION INTRAVENOUS ONCE
Status: DISCONTINUED | OUTPATIENT
Start: 2024-11-17 | End: 2024-11-17

## 2024-11-17 RX ORDER — DIGOXIN 125 MCG
125 TABLET ORAL DAILY
Status: DISCONTINUED | OUTPATIENT
Start: 2024-11-17 | End: 2024-11-18

## 2024-11-17 RX ORDER — SODIUM CHLORIDE 0.9 % (FLUSH) 0.9 %
5-40 SYRINGE (ML) INJECTION EVERY 12 HOURS SCHEDULED
Status: DISCONTINUED | OUTPATIENT
Start: 2024-11-17 | End: 2024-11-20 | Stop reason: HOSPADM

## 2024-11-17 RX ORDER — LORAZEPAM 2 MG/ML
2 INJECTION INTRAMUSCULAR ONCE
Status: COMPLETED | OUTPATIENT
Start: 2024-11-17 | End: 2024-11-17

## 2024-11-17 RX ORDER — SODIUM CHLORIDE 9 MG/ML
INJECTION, SOLUTION INTRAVENOUS CONTINUOUS
Status: DISCONTINUED | OUTPATIENT
Start: 2024-11-17 | End: 2024-11-18

## 2024-11-17 RX ORDER — ROSUVASTATIN CALCIUM 20 MG/1
20 TABLET, COATED ORAL NIGHTLY
Status: DISCONTINUED | OUTPATIENT
Start: 2024-11-17 | End: 2024-11-20 | Stop reason: HOSPADM

## 2024-11-17 RX ORDER — LISINOPRIL 20 MG/1
20 TABLET ORAL DAILY
Status: DISCONTINUED | OUTPATIENT
Start: 2024-11-17 | End: 2024-11-18

## 2024-11-17 RX ADMIN — METOPROLOL TARTRATE 50 MG: 50 TABLET, FILM COATED ORAL at 21:10

## 2024-11-17 RX ADMIN — METOPROLOL TARTRATE 50 MG: 50 TABLET, FILM COATED ORAL at 07:45

## 2024-11-17 RX ADMIN — PANTOPRAZOLE SODIUM 40 MG: 40 TABLET, DELAYED RELEASE ORAL at 12:32

## 2024-11-17 RX ADMIN — SODIUM CHLORIDE: 9 INJECTION, SOLUTION INTRAVENOUS at 23:55

## 2024-11-17 RX ADMIN — ACETAMINOPHEN 650 MG: 325 TABLET ORAL at 19:26

## 2024-11-17 RX ADMIN — ACETAMINOPHEN 650 MG: 325 TABLET ORAL at 12:29

## 2024-11-17 RX ADMIN — SODIUM CHLORIDE 1000 ML: 9 INJECTION, SOLUTION INTRAVENOUS at 04:25

## 2024-11-17 RX ADMIN — SODIUM CHLORIDE 1500 ML: 9 INJECTION, SOLUTION INTRAVENOUS at 07:12

## 2024-11-17 RX ADMIN — OXYCODONE 5 MG: 5 TABLET ORAL at 23:06

## 2024-11-17 RX ADMIN — FENOFIBRATE 160 MG: 160 TABLET ORAL at 07:45

## 2024-11-17 RX ADMIN — LORAZEPAM 2 MG: 2 INJECTION INTRAMUSCULAR; INTRAVENOUS at 06:50

## 2024-11-17 RX ADMIN — SODIUM CHLORIDE: 9 INJECTION, SOLUTION INTRAVENOUS at 15:39

## 2024-11-17 RX ADMIN — SODIUM CHLORIDE: 9 INJECTION, SOLUTION INTRAVENOUS at 11:23

## 2024-11-17 RX ADMIN — ROSUVASTATIN CALCIUM 20 MG: 20 TABLET, FILM COATED ORAL at 21:10

## 2024-11-17 RX ADMIN — KETOROLAC TROMETHAMINE 15 MG: 30 INJECTION, SOLUTION INTRAMUSCULAR at 02:55

## 2024-11-17 RX ADMIN — Medication 2000 UNITS: at 07:45

## 2024-11-17 RX ADMIN — ACETAMINOPHEN 650 MG: 325 TABLET ORAL at 05:58

## 2024-11-17 RX ADMIN — WATER 1000 MG: 1 INJECTION INTRAMUSCULAR; INTRAVENOUS; SUBCUTANEOUS at 04:21

## 2024-11-17 ASSESSMENT — PAIN DESCRIPTION - ONSET: ONSET: PROGRESSIVE

## 2024-11-17 ASSESSMENT — PAIN - FUNCTIONAL ASSESSMENT
PAIN_FUNCTIONAL_ASSESSMENT: ACTIVITIES ARE NOT PREVENTED
PAIN_FUNCTIONAL_ASSESSMENT: 0-10
PAIN_FUNCTIONAL_ASSESSMENT: NONE - DENIES PAIN
PAIN_FUNCTIONAL_ASSESSMENT: 0-10
PAIN_FUNCTIONAL_ASSESSMENT: NONE - DENIES PAIN

## 2024-11-17 ASSESSMENT — PAIN SCALES - GENERAL
PAINLEVEL_OUTOF10: 4
PAINLEVEL_OUTOF10: 8
PAINLEVEL_OUTOF10: 7
PAINLEVEL_OUTOF10: 3
PAINLEVEL_OUTOF10: 8

## 2024-11-17 ASSESSMENT — PAIN DESCRIPTION - ORIENTATION
ORIENTATION: RIGHT;LOWER
ORIENTATION: MID

## 2024-11-17 ASSESSMENT — PAIN DESCRIPTION - LOCATION
LOCATION: PENIS
LOCATION: FLANK
LOCATION: GENERALIZED

## 2024-11-17 ASSESSMENT — PAIN DESCRIPTION - DESCRIPTORS
DESCRIPTORS: BURNING;SHARP
DESCRIPTORS: DULL
DESCRIPTORS: ACHING

## 2024-11-17 ASSESSMENT — PAIN DESCRIPTION - PAIN TYPE: TYPE: ACUTE PAIN

## 2024-11-17 ASSESSMENT — PAIN DESCRIPTION - FREQUENCY: FREQUENCY: INTERMITTENT

## 2024-11-17 NOTE — PROGRESS NOTES
1555: RN reached out to JOSELO Roberson with urology in regards to patient has urinated very little since admission. Pt just tried to urinate and was only able to urinate 10cc. RN and another RN attempted to bladder scan and was only finding 54cc on bladder scan. Also informed JOSELO Roberson that patient has received over 2L today.     RN also informed Dr. Washington in regards to above.    1611: RN received orders from Urology to place mendes catheter.    1658: RN asked Dr. Washington if lactics needed to be trended more frequently d/t recent lactic of 3.1. No further orders from Dr. Washington    1725: RN informed Dr. Washington of concern of patient not making much urine with all the fluids given to patient today and asked for more recent labs d/t labs not being checked since 0200. No further orders from Dr. Washington.       1727: RN informed JOSELO Roberson with urology that RN was only able to get 5ml of urine from mendes. RN concerned that patient is not making urine with all the fluids patient has received today and has not made more than 100ml in 12 hours.     1736: Orders placed for CBC and BMP per Urology.

## 2024-11-17 NOTE — ED TRIAGE NOTES
Pt presents to ED with right sided flank pain that started tonight. Pt reports a history of multiple kidney stones in the past. Reports pain as a dull pain. Pt reports taking Tylenol with no relief at 2300.

## 2024-11-17 NOTE — H&P
Attending supervising physicians attestation statement:       I Discussed the findings and plans with the Resident physician  personally   and agree with the  note as outlined below  . spoke with the staff  Regarding care plans and recommendations.  Patient was seen and examined by this provider.         Electronically signed by Leonidas Tyson MD on 11/17/2024 at 6:21 PM             Medicine Admission History & Physical      Patient:  Martin Morris IV  YOB: 1955  Date of Service: 11/17/2024  MRN: [id number]   Acct: [id number]   Primary Care Physician: No primary care provider on file.    Admitting Faculty MD: Balta Dia MD    Code Status: No Order No additional code details    Date of Service: Pt seen/examined in consultation on 11/17/2024     Assessment / Plan     Briefly, pt Martin Morris IV is a 69 y.o. male with a PMH of AS s/p bioprosthetic valve, heart block s/p dual-chamber pacemaker, hyperlipidemia, hypertension, paroxysmal atrial fibrillation, ADRI on CPAP, T2DM, nephrolithiasis who presented with right flank pain for a few hours.  Diagnosed with obstructive uropathy 2/2 nephrolithiasis.  Admitted for further evaluation.    #Obstructive uropathy 2/2 right nephrolithiasis  #UTI  Right-sided flank pain. CTAP with 6 mm stone in right ureter with associated right hydronephrosis.  History of nephrolithiasis earlier this year.  Requiring lithotripsy/bilateral ureteral stent placement.  Stents removed 5/2024.  UA showing large blood, large leukocytes, >100 WBC, many bacteria.  Does have leukocytosis.  No fevers. Severe chills.  Plan:  -Given dose of Toradol in ED  -Urology consulted by ED provider, will evaluate this morning, appreciate recommendation  -Keep n.p.o.  -Pain control as necessary, will avoid NSAIDs in setting of CLIFFORD  -Continue Rocephin, follow urine cultures, adjust as necessary  -Will order blood cultures and lactic acid  -Daily CBC    #CLIFFORD: Likely  /hpf    Casts UA 4-8 HYALINE NONE SEEN /lpf    Crystals, UA NONE SEEN NONE SEEN    Renal Epithelial, UA NONE SEEN NONE SEEN    Yeast, UA NONE SEEN NONE SEEN    CASTS 2 NONE SEEN NONE SEEN /lpf    MISCELLANEOUS 2 NONE SEEN    Anion Gap   Result Value Ref Range    Anion Gap 16.0 8.0 - 16.0 meq/L   Osmolality   Result Value Ref Range    Osmolality Calc 284.6 275.0 - 300.0 mOsmol/kg   Glomerular Filtration Rate, Estimated   Result Value Ref Range    Est, Glom Filt Rate 47 (A) >60 ml/min/1.73m2   EKG 12 Lead   Result Value Ref Range    Ventricular Rate 78 BPM    Atrial Rate 78 BPM    P-R Interval 192 ms    QRS Duration 172 ms    Q-T Interval 414 ms    QTc Calculation (Bazett) 471 ms    R Axis -107 degrees    T Axis 36 degrees       Diagnostics:  CT ABDOMEN PELVIS WO CONTRAST Additional Contrast? None    Result Date: 11/17/2024  CT Abdomen and Pelvis WO Contrast COMPARISON: None FINDINGS: Small hiatal hernia. Nodules in the left lower lobe measuring 14 mm and 6 mm (series 301, image 3). Hepatomegaly. Splenomegaly. Calculus in the proximal right ureter measuring 6 mm. Mild right hydronephrosis. Right renal cyst. Mildly atrophic left kidney. Nonspecific right-greater-than-left perinephric fat stranding. Normal adrenal glands. Normal pancreas. No visible cholelithiasis. No biliary dilation. Mild colonic diverticulosis without evidence of acute diverticulitis. No mucosal thickening. No evidence of obstruction. Normal appendix. Mild diffuse bladder wall thickening. Mildly enlarged prostate. No ascites. No pneumoperitoneum. No lymphadenopathy. No acute fracture. Degenerative changes in the spine and hips. Chronic appearing bilateral L5 spondylolysis. Grade 1 L5-S1 anterolisthesis. No abdominal aortic aneurysm. Bilateral fat-containing inguinal hernias. Fat-containing umbilical hernia.     Right ureteral calculus. Mild right hydronephrosis. Possible cystitis. Left lower lobe pulmonary nodules. Recommend follow-up chest CT in 3-6

## 2024-11-17 NOTE — ED NOTES
ED to inpatient nurses report      Chief Complaint:  Chief Complaint   Patient presents with    Flank Pain     Present to ED from: Home    MOA:     LOC: alert and orientated to name, place, date  Mobility: Requires assistance * 1  Oxygen Baseline: RA    Current needs required: 0     Code Status:   No Order    What abnormal results were found and what did you give/do to treat them? Cystitis, kidney stone, CLIFFORD, elevated BP reading; scans, labs, medication  Any procedures or intervention occur? NA    Mental Status:  Level of Consciousness: Alert (0)    Psych Assessment:        Vitals:  Patient Vitals for the past 24 hrs:   BP Temp Pulse Resp SpO2 Height Weight   11/17/24 0428 (!) 159/93 -- 84 18 93 % -- --   11/17/24 0407 (!) 162/88 -- -- -- -- -- --   11/17/24 0348 (!) 174/83 -- 77 16 91 % -- --   11/17/24 0255 (!) 172/96 -- 77 16 94 % -- --   11/17/24 0227 (!) 199/92 98.3 °F (36.8 °C) 79 20 94 % 1.854 m (6' 1\") (!) 158.8 kg (350 lb)        LDAs:   Peripheral IV 11/17/24 Left;Posterior Hand (Active)   Site Assessment Clean, dry & intact 11/17/24 0429   Line Status Infusing 11/17/24 0429   Phlebitis Assessment No symptoms 11/17/24 0429   Infiltration Assessment 0 11/17/24 0429   Dressing Status Clean, dry & intact 11/17/24 0429   Dressing Type Transparent 11/17/24 0429       Ambulatory Status:  Presents to emergency department  because of falls (Syncope, seizure, or loss of consciousness): No, Age > 70: No, Altered Mental Status, Intoxication with alcohol or substance confusion (Disorientation, impaired judgment, poor safety awaremess, or inability to follow instructions): No, Impaired Mobility: Ambulates or transfers with assistive devices or assistance; Unable to ambulate or transer.: Yes, Nursing Judgement: Yes    Diagnosis:  DISPOSITION Decision To Admit 11/17/2024 04:35:48 AM   Final diagnoses:   Acute cystitis with hematuria   Kidney stone   CLIFFORD (acute kidney injury) (HCC)   Elevated blood pressure reading with  diagnosis of hypertension        Consults:  None     Pain Score:  Pain Assessment  Pain Assessment: None - Denies Pain  Pain Level: 8  Patient's Stated Pain Goal: 4  Pain Location: Flank  Pain Orientation: Right, Lower  Pain Descriptors: Dull    C-SSRS:   Risk of Suicide: No Risk    Sepsis Screening:       [place] Fall Risk:  [place] 1 Fall Risk  Presents to emergency department  because of falls (Syncope, seizure, or loss of consciousness): No  Age > 70: No  Altered Mental Status, Intoxication with alcohol or substance confusion (Disorientation, impaired judgment, poor safety awaremess, or inability to follow instructions): No  Impaired Mobility: Ambulates or transfers with assistive devices or assistance; Unable to ambulate or transer.: Yes  Nursing Judgement: Yes  Standard Fall Risk Interventions : [place] the patient to the environment, especially the location of the bathroom, Provide a safe environment by clearing a pathway free of plugs, IV poles, and other obstructing items, Call light and frequently used items within patient reach, Bed in lowest position and wheels locked, as applicable to the type of bed, when a patient is resting in bed, raise bed to a comfortable height when the patient is transferring out of bed, as appropriate, Intentional Rounding    Swallow Screening        Preferred Language:   English      ALLERGIES     Chocolate, Ciprofloxacin, Dairy digestive [tilactase], Other, Penicillins, and Food    SURGICAL HISTORY       Past Surgical History:   Procedure Laterality Date    COLONOSCOPY  01/26/2004    POLYP    COLONOSCOPY  12/13/2016    polyps Dr. Srinivasan Mason repeat in 2020    COLONOSCOPY N/A 12/6/2022    COLONOSCOPY POLYPECTOMY SNARE/COLD BIOPSY performed by Balta Mason MD at [facility] ENDOSCOPY    ENDOSCOPY, COLON, DIAGNOSTIC      HERNIA REPAIR      HERNIA REPAIR  1998, 1999    [facility]    JOINT REPLACEMENT      left knee    JOINT REPLACEMENT Left 11/19/13    L TKR Dr Reyna     JOINT REPLACEMENT

## 2024-11-17 NOTE — ED PROVIDER NOTES
[facility] EMERGENCY DEPT      Pt Name: Martin Morris IV  MRN: [id number]  Birthdate 1955  Date of evaluation: 11/17/2024  Provider: Delmi Cuello PA-C    CHIEF COMPLAINT       Chief Complaint   Patient presents with    Flank Pain       Nurses Notes reviewed and I agree except as noted in the HPI.      HISTORY OF PRESENT ILLNESS    Martin Morris IV is a 69 y.o. male who presents from home for possible kidney stone.  Patient reports having right flank pain for the past couple hours that feels similar to previous kidney stones.  The pain is predominantly in his right side and radiates towards the back.  It is sharp but waxes and wanes with no modifying factors.  Patient feels he is urinating less than normal.  Patient denies nausea, vomiting, fever, chills, hematuria, urinary frequency, difficulty urinating, dysuria, diarrhea, constipation, chest pain, dyspnea, or other complaints.    Location/Symptom: Right flank pain  Timing/Onset: Couple hours prior to arrival  Context/Setting: Feels like prior kidney stones.  Patient passed a 5 mm and 9 mm stone last year  Quality: Sharp  Duration: Constant, waxes and wanes  Modifying Factors: None  Severity: 8/10    REVIEW OF SYSTEMS     Review of Systems   Constitutional:  Negative for appetite change, chills and fever.   Eyes:  Negative for photophobia.   Respiratory:  Negative for cough and shortness of breath.    Cardiovascular:  Negative for chest pain.   Gastrointestinal:  Negative for abdominal pain, diarrhea, nausea and vomiting.   Genitourinary:  Positive for flank pain. Negative for difficulty urinating, dysuria and frequency.   Musculoskeletal:  Positive for back pain. Negative for gait problem.   Skin:  Negative for rash.   Neurological:  Negative for dizziness and weakness.   Psychiatric/Behavioral:  Positive for sleep disturbance. Negative for confusion.         PAST MEDICAL HISTORY    has a past medical history of Apnea, Hyperlipidemia, Kidney

## 2024-11-17 NOTE — PROGRESS NOTES
1017: RN reached out to Dr. Washington d/t patient coming from 6K d/t early sepsis. RN notified resident that know lactic was ordered with labs this AM.    1019: Orders placed for STAT lactic per Dr. Washington    1244: RN notified Dr. Washington that lactic was 3.1

## 2024-11-17 NOTE — PLAN OF CARE
Problem: Discharge Planning  Goal: Discharge to home or other facility with appropriate resources  Outcome: Progressing  Flowsheets  Taken 11/17/2024 1438 by Kera Del Real RN  Discharge to home or other facility with appropriate resources:   Identify barriers to discharge with patient and caregiver   Identify discharge learning needs (meds, wound care, etc)   Refer to discharge planning if patient needs post-hospital services based on physician order or complex needs related to functional status, cognitive ability or social support system   Arrange for needed discharge resources and transportation as appropriate     Problem: ABCDS Injury Assessment  Goal: Absence of physical injury  Outcome: Progressing  Flowsheets (Taken 11/17/2024 1438)  Absence of Physical Injury: Implement safety measures based on patient assessment     Problem: Safety - Adult  Goal: Free from fall injury  Outcome: Progressing  Flowsheets (Taken 11/17/2024 1438)  Free From Fall Injury:   Instruct family/caregiver on patient safety   Based on caregiver fall risk screen, instruct family/caregiver to ask for assistance with transferring infant if caregiver noted to have fall risk factors     Problem: Skin/Tissue Integrity  Goal: Absence of new skin breakdown  Description: 1.  Monitor for areas of redness and/or skin breakdown  2.  Assess vascular access sites hourly  3.  Every 4-6 hours minimum:  Change oxygen saturation probe site  4.  Every 4-6 hours:  If on nasal continuous positive airway pressure, respiratory therapy assess nares and determine need for appliance change or resting period.  Outcome: Progressing     Problem: Pain  Goal: Verbalizes/displays adequate comfort level or baseline comfort level  Outcome: Progressing  Flowsheets (Taken 11/17/2024 1438)  Verbalizes/displays adequate comfort level or baseline comfort level:   Encourage patient to monitor pain and request assistance   Consider cultural and social influences on pain and

## 2024-11-17 NOTE — PROGRESS NOTES
Patient transferred to [facility] from . Vital signs obtained and continuous cardiac monitor applied. Peripheral IV located in left hand with normal saline infusing at 500ml/hr bolus at this time. Two RN skin assessment performed by Kera RN and Connie RN. Patient oriented to room and  policies at this time.

## 2024-11-17 NOTE — PLAN OF CARE
Name: Martin Morris IV  YOB: 1955  MRN: [id number]  Date: 11/17/24     Plan of Care    I have seen and evaluated the patient today at bedside.  In summary, this is a 69-year-old male PMH AAS s/p bioprosthetic valve, heart block s/p dual-chamber pacemaker, HTN, HLD, paroxysmal A-fib, ADRI on CPAP, T2DM, recurrent nephrolithiasis presenting for right flank pain, admitted to the hospital for sepsis from complicated UTI secondary to nephrolithiasis.  S/p 2.5L.  Hospitalist team will continue to manage his complicated UTI with IV ceftriaxone, IV NS at 150 cc/h.  CLIFFORD treated with IVF, holding nephrotoxic agents.  Patient had new onset urinary retention today, bladder scans 124, 54 cc with only 10 cc out -Robles placed.  Paroxysmal A-fib treated with Lopressor, digoxin.  Avoid fluoroquinolones due to patient's history of ascending thoracic aneurysm.  Avoid NSAIDs due to patient's current CLIFFORD.    Urology following, plan for cystoscopy, right ureteroscopic, right ureteral stent placement pending clinical course, likely tomorrow 11/18.  N.p.o. at midnight.    Pending blood cultures, urine cultures. Previous urine culture 7/2023 shows E. coli with susceptibility to cephalosporins.    Sepsis due to complicated UTI from infected nephrolithiasis: SOFA 4, qSOFA 1.     Patient has history of multiple stent placements/removals. Stent removal 5/2024 at OSH.

## 2024-11-17 NOTE — SIGNIFICANT EVENT
On initial evaluation, patient was alert/oriented x4, sitting up in bed, and answering questions appropriately.    Was called back to the room when patient was having severe chills and shaking uncontrollably in the bed. Has not had this before in the past. Unlikely drug reaction as I reviewed old hospital encounters at outside facility where her received rocephin without issues. Complaining of SOB but has O2 sat >95%, no wheezing on exam.    Blood cultures/lactic acid were ordered/obtained. Pt remained afebrile with temp 98, but did receive a dose of toradol in the ED which may be lowering the temp.    Received another message that pt temp has increased to 99.8. , /106. And patient is now more disoriented and unaware of where he is and not able to hold himself up.    Will empirically treat as sepsis. Pt currently receive 1L fluid bolus that was ordered in the ED, will give additionall 1.5L bolus to complete 30 cc/kg. Follow cultures and monitor blood pressure closely. Sitter if necessary to keep patient in bed/oriented. Can consider low dose of haldol if severely agitated.    Electronically Signed by  Dex Rm DO, MBA  PGY-2 Internal Medicine Resident  [facility]  On 11/17/2024 at 7:10 AM

## 2024-11-17 NOTE — CONSULTS
[facility]  [facility] RENAL TELEMETRY 6K  [address] [address]  Dept: [phone]  Loc: [phone]  Visit Date: 11/17/2024    Urology Consult Note    Reason for Consult:  Right Ureteral Calculus  Requesting Physician:  Delmi Cuello PA-C    History Obtained From:  EMR and Patient    Chief Complaint: Right Ureteral Calculus    HPI:    Martin Morris IV is a 69 y.o. male who presented to [facility] ED late last evening with complaints of right-sided flank pain and nausea where he was found to have a 6 mm right proximal ureteral calculus with mild right-sided hydronephrosis. Admitted for management given accompanying UTI and CLIFFORD. Patient is now febrile and chilled + tachycardic. BP WNL at 129/80. Pain is controlled at this time. Urology consulted for additional evaluation and management of patient's stone.      Past Medical History:        Diagnosis Date    Apnea     Hyperlipidemia     Kidney stone on right side 1/2016    passed ok     Syncope 10/2004    Unspecified sleep apnea     using C-PAP     Past Surgical History:        Procedure Laterality Date    COLONOSCOPY  01/26/2004    POLYP    COLONOSCOPY  12/13/2016    polyps Dr. Srinivasan Mason repeat in 2020    COLONOSCOPY N/A 12/6/2022    COLONOSCOPY POLYPECTOMY SNARE/COLD BIOPSY performed by Balta Mason MD at [facility] ENDOSCOPY    ENDOSCOPY, COLON, DIAGNOSTIC      HERNIA REPAIR      HERNIA REPAIR  1998, 1999    [facility]    JOINT REPLACEMENT      left knee    JOINT REPLACEMENT Left 11/19/13    L TKR Dr Reyna     JOINT REPLACEMENT Right 11/2014    R TKR Dr Reyna     UPPER GASTROINTESTINAL ENDOSCOPY  03/23/2018    Dr. Balta Mason repeat in 2021    UPPER GASTROINTESTINAL ENDOSCOPY N/A 12/6/2022    EGD BIOPSY performed by Balta Mason MD at [facility] ENDOSCOPY     Allergies:  Chocolate, Ciprofloxacin, Dairy digestive [tilactase], Other, Penicillins, and Food  Social History:  Social History     Socioeconomic History

## 2024-11-18 ENCOUNTER — ANESTHESIA EVENT (OUTPATIENT)
Dept: OPERATING ROOM | Age: 69
DRG: 659 | End: 2024-11-18
Payer: MEDICARE

## 2024-11-18 ENCOUNTER — ANESTHESIA (OUTPATIENT)
Dept: OPERATING ROOM | Age: 69
DRG: 659 | End: 2024-11-18
Payer: MEDICARE

## 2024-11-18 LAB
ANION GAP SERPL CALC-SCNC: 13 MEQ/L (ref 8–16)
BACTERIA UR CULT: ABNORMAL
BASOPHILS ABSOLUTE: 0 THOU/MM3 (ref 0–0.1)
BASOPHILS NFR BLD AUTO: 0.2 %
BUN SERPL-MCNC: 42 MG/DL (ref 7–22)
CALCIUM SERPL-MCNC: 8 MG/DL (ref 8.5–10.5)
CHLORIDE SERPL-SCNC: 105 MEQ/L (ref 98–111)
CO2 SERPL-SCNC: 19 MEQ/L (ref 23–33)
CREAT SERPL-MCNC: 4.1 MG/DL (ref 0.4–1.2)
DEPRECATED RDW RBC AUTO: 48.3 FL (ref 35–45)
DEPRECATED RDW RBC AUTO: 50.7 FL (ref 35–45)
EOSINOPHIL NFR BLD AUTO: 0 %
EOSINOPHILS ABSOLUTE: 0 THOU/MM3 (ref 0–0.4)
ERYTHROCYTE [DISTWIDTH] IN BLOOD BY AUTOMATED COUNT: 13.7 % (ref 11.5–14.5)
ERYTHROCYTE [DISTWIDTH] IN BLOOD BY AUTOMATED COUNT: 14 % (ref 11.5–14.5)
GFR SERPL CREATININE-BSD FRML MDRD: 15 ML/MIN/1.73M2
GLUCOSE BLD STRIP.AUTO-MCNC: 109 MG/DL (ref 70–108)
GLUCOSE BLD STRIP.AUTO-MCNC: 119 MG/DL (ref 70–108)
GLUCOSE BLD STRIP.AUTO-MCNC: 131 MG/DL (ref 70–108)
GLUCOSE BLD STRIP.AUTO-MCNC: 134 MG/DL (ref 70–108)
GLUCOSE BLD STRIP.AUTO-MCNC: 145 MG/DL (ref 70–108)
GLUCOSE BLD STRIP.AUTO-MCNC: 160 MG/DL (ref 70–108)
GLUCOSE SERPL-MCNC: 146 MG/DL (ref 70–108)
HCT VFR BLD AUTO: 42.3 % (ref 42–52)
HCT VFR BLD AUTO: 48.7 % (ref 42–52)
HGB BLD-MCNC: 13.9 GM/DL (ref 14–18)
HGB BLD-MCNC: 16.7 GM/DL (ref 14–18)
IMM GRANULOCYTES # BLD AUTO: 0.65 THOU/MM3 (ref 0–0.07)
IMM GRANULOCYTES NFR BLD AUTO: 2.7 %
LACTATE SERPL-SCNC: 1.7 MMOL/L (ref 0.5–2)
LYMPHOCYTES ABSOLUTE: 0.7 THOU/MM3 (ref 1–4.8)
LYMPHOCYTES NFR BLD AUTO: 3.1 %
MAGNESIUM SERPL-MCNC: 1.7 MG/DL (ref 1.6–2.4)
MCH RBC QN AUTO: 32.2 PG (ref 26–33)
MCH RBC QN AUTO: 32.9 PG (ref 26–33)
MCHC RBC AUTO-ENTMCNC: 32.9 GM/DL (ref 32.2–35.5)
MCHC RBC AUTO-ENTMCNC: 34.3 GM/DL (ref 32.2–35.5)
MCV RBC AUTO: 95.9 FL (ref 80–94)
MCV RBC AUTO: 97.9 FL (ref 80–94)
MONOCYTES ABSOLUTE: 0.6 THOU/MM3 (ref 0.4–1.3)
MONOCYTES NFR BLD AUTO: 2.3 %
NEUTROPHILS ABSOLUTE: 22 THOU/MM3 (ref 1.8–7.7)
NEUTROPHILS NFR BLD AUTO: 91.7 %
NRBC BLD AUTO-RTO: 0 /100 WBC
ORGANISM: ABNORMAL
PATHOLOGIST REVIEW: ABNORMAL
PLATELET # BLD AUTO: 110 THOU/MM3 (ref 130–400)
PLATELET # BLD AUTO: 133 THOU/MM3 (ref 130–400)
PLATELET BLD QL SMEAR: ADEQUATE
PMV BLD AUTO: 11.4 FL (ref 9.4–12.4)
PMV BLD AUTO: 11.9 FL (ref 9.4–12.4)
POTASSIUM SERPL-SCNC: 4.2 MEQ/L (ref 3.5–5.2)
RBC # BLD AUTO: 4.32 MILL/MM3 (ref 4.7–6.1)
RBC # BLD AUTO: 5.08 MILL/MM3 (ref 4.7–6.1)
SODIUM SERPL-SCNC: 137 MEQ/L (ref 135–145)
WBC # BLD AUTO: 19.1 THOU/MM3 (ref 4.8–10.8)
WBC # BLD AUTO: 24 THOU/MM3 (ref 4.8–10.8)

## 2024-11-18 PROCEDURE — C1769 GUIDE WIRE: HCPCS | Performed by: UROLOGY

## 2024-11-18 PROCEDURE — 0T768DZ DILATION OF RIGHT URETER WITH INTRALUMINAL DEVICE, VIA NATURAL OR ARTIFICIAL OPENING ENDOSCOPIC: ICD-10-PCS | Performed by: UROLOGY

## 2024-11-18 PROCEDURE — 2580000003 HC RX 258: Performed by: INTERNAL MEDICINE

## 2024-11-18 PROCEDURE — 3600000003 HC SURGERY LEVEL 3 BASE: Performed by: UROLOGY

## 2024-11-18 PROCEDURE — 6360000002 HC RX W HCPCS

## 2024-11-18 PROCEDURE — 3700000001 HC ADD 15 MINUTES (ANESTHESIA): Performed by: UROLOGY

## 2024-11-18 PROCEDURE — 6360000002 HC RX W HCPCS: Performed by: NURSE ANESTHETIST, CERTIFIED REGISTERED

## 2024-11-18 PROCEDURE — 2060000000 HC ICU INTERMEDIATE R&B

## 2024-11-18 PROCEDURE — 3600000013 HC SURGERY LEVEL 3 ADDTL 15MIN: Performed by: UROLOGY

## 2024-11-18 PROCEDURE — 80048 BASIC METABOLIC PNL TOTAL CA: CPT

## 2024-11-18 PROCEDURE — 85027 COMPLETE CBC AUTOMATED: CPT

## 2024-11-18 PROCEDURE — 2580000003 HC RX 258

## 2024-11-18 PROCEDURE — C2617 STENT, NON-COR, TEM W/O DEL: HCPCS | Performed by: UROLOGY

## 2024-11-18 PROCEDURE — 82948 REAGENT STRIP/BLOOD GLUCOSE: CPT

## 2024-11-18 PROCEDURE — 83735 ASSAY OF MAGNESIUM: CPT

## 2024-11-18 PROCEDURE — 3700000000 HC ANESTHESIA ATTENDED CARE: Performed by: UROLOGY

## 2024-11-18 PROCEDURE — 2709999900 HC NON-CHARGEABLE SUPPLY: Performed by: UROLOGY

## 2024-11-18 PROCEDURE — 99233 SBSQ HOSP IP/OBS HIGH 50: CPT | Performed by: INTERNAL MEDICINE

## 2024-11-18 PROCEDURE — 6370000000 HC RX 637 (ALT 250 FOR IP): Performed by: UROLOGY

## 2024-11-18 PROCEDURE — 83605 ASSAY OF LACTIC ACID: CPT

## 2024-11-18 PROCEDURE — 36415 COLL VENOUS BLD VENIPUNCTURE: CPT

## 2024-11-18 PROCEDURE — 6370000000 HC RX 637 (ALT 250 FOR IP)

## 2024-11-18 DEVICE — URETERAL STENT
Type: IMPLANTABLE DEVICE | Site: URETER | Status: FUNCTIONAL
Brand: PERCUFLEX™ PLUS

## 2024-11-18 RX ORDER — DIGOXIN 250 MCG
250 TABLET ORAL DAILY
Status: DISCONTINUED | OUTPATIENT
Start: 2024-11-19 | End: 2024-11-20 | Stop reason: HOSPADM

## 2024-11-18 RX ORDER — HYDRALAZINE HYDROCHLORIDE 20 MG/ML
10 INJECTION INTRAMUSCULAR; INTRAVENOUS EVERY 6 HOURS PRN
Status: DISCONTINUED | OUTPATIENT
Start: 2024-11-18 | End: 2024-11-20 | Stop reason: HOSPADM

## 2024-11-18 RX ORDER — LISINOPRIL 40 MG/1
40 TABLET ORAL DAILY
Status: DISCONTINUED | OUTPATIENT
Start: 2024-11-19 | End: 2024-11-20 | Stop reason: HOSPADM

## 2024-11-18 RX ORDER — PROPOFOL 10 MG/ML
INJECTION, EMULSION INTRAVENOUS
Status: DISCONTINUED | OUTPATIENT
Start: 2024-11-18 | End: 2024-11-18 | Stop reason: SDUPTHER

## 2024-11-18 RX ORDER — OXYBUTYNIN CHLORIDE 5 MG/1
5 TABLET ORAL EVERY 8 HOURS PRN
Status: DISCONTINUED | OUTPATIENT
Start: 2024-11-18 | End: 2024-11-20 | Stop reason: HOSPADM

## 2024-11-18 RX ORDER — FENTANYL CITRATE 50 UG/ML
INJECTION, SOLUTION INTRAMUSCULAR; INTRAVENOUS
Status: DISCONTINUED | OUTPATIENT
Start: 2024-11-18 | End: 2024-11-18 | Stop reason: SDUPTHER

## 2024-11-18 RX ORDER — LIDOCAINE HCL/PF 100 MG/5ML
SYRINGE (ML) INJECTION
Status: DISCONTINUED | OUTPATIENT
Start: 2024-11-18 | End: 2024-11-18 | Stop reason: SDUPTHER

## 2024-11-18 RX ORDER — LISINOPRIL 40 MG/1
40 TABLET ORAL DAILY
Status: ON HOLD | COMMUNITY
End: 2024-11-20

## 2024-11-18 RX ORDER — SODIUM CHLORIDE 9 MG/ML
INJECTION, SOLUTION INTRAVENOUS CONTINUOUS
Status: DISCONTINUED | OUTPATIENT
Start: 2024-11-18 | End: 2024-11-20 | Stop reason: HOSPADM

## 2024-11-18 RX ADMIN — METOPROLOL TARTRATE 50 MG: 50 TABLET, FILM COATED ORAL at 19:40

## 2024-11-18 RX ADMIN — OXYCODONE 5 MG: 5 TABLET ORAL at 10:09

## 2024-11-18 RX ADMIN — SODIUM CHLORIDE: 9 INJECTION, SOLUTION INTRAVENOUS at 07:54

## 2024-11-18 RX ADMIN — PROPOFOL 50 MG: 10 INJECTION, EMULSION INTRAVENOUS at 07:48

## 2024-11-18 RX ADMIN — PROPOFOL 50 MG: 10 INJECTION, EMULSION INTRAVENOUS at 07:55

## 2024-11-18 RX ADMIN — HYDRALAZINE HYDROCHLORIDE 10 MG: 20 INJECTION INTRAMUSCULAR; INTRAVENOUS at 18:31

## 2024-11-18 RX ADMIN — SODIUM CHLORIDE: 9 INJECTION, SOLUTION INTRAVENOUS at 08:41

## 2024-11-18 RX ADMIN — OXYBUTYNIN CHLORIDE 5 MG: 5 TABLET ORAL at 22:04

## 2024-11-18 RX ADMIN — WATER 1000 MG: 1 INJECTION INTRAMUSCULAR; INTRAVENOUS; SUBCUTANEOUS at 05:53

## 2024-11-18 RX ADMIN — FENTANYL CITRATE 25 MCG: 50 INJECTION, SOLUTION INTRAMUSCULAR; INTRAVENOUS at 07:41

## 2024-11-18 RX ADMIN — PROPOFOL 50 MG: 10 INJECTION, EMULSION INTRAVENOUS at 07:52

## 2024-11-18 RX ADMIN — OXYCODONE 5 MG: 5 TABLET ORAL at 22:04

## 2024-11-18 RX ADMIN — METOPROLOL TARTRATE 50 MG: 50 TABLET, FILM COATED ORAL at 08:42

## 2024-11-18 RX ADMIN — PROPOFOL 50 MG: 10 INJECTION, EMULSION INTRAVENOUS at 07:41

## 2024-11-18 RX ADMIN — SODIUM CHLORIDE: 9 INJECTION, SOLUTION INTRAVENOUS at 20:11

## 2024-11-18 RX ADMIN — ROSUVASTATIN CALCIUM 20 MG: 20 TABLET, FILM COATED ORAL at 19:40

## 2024-11-18 RX ADMIN — FENOFIBRATE 160 MG: 160 TABLET ORAL at 08:41

## 2024-11-18 RX ADMIN — SODIUM CHLORIDE, PRESERVATIVE FREE 10 ML: 5 INJECTION INTRAVENOUS at 08:43

## 2024-11-18 RX ADMIN — Medication 2000 UNITS: at 08:43

## 2024-11-18 RX ADMIN — Medication 100 MG: at 07:41

## 2024-11-18 RX ADMIN — OXYBUTYNIN CHLORIDE 5 MG: 5 TABLET ORAL at 11:58

## 2024-11-18 ASSESSMENT — PAIN SCALES - GENERAL
PAINLEVEL_OUTOF10: 0
PAINLEVEL_OUTOF10: 7

## 2024-11-18 ASSESSMENT — PAIN - FUNCTIONAL ASSESSMENT: PAIN_FUNCTIONAL_ASSESSMENT: ACTIVITIES ARE NOT PREVENTED

## 2024-11-18 ASSESSMENT — PAIN DESCRIPTION - LOCATION
LOCATION: PENIS
LOCATION: PENIS

## 2024-11-18 ASSESSMENT — LIFESTYLE VARIABLES: SMOKING_STATUS: 0

## 2024-11-18 ASSESSMENT — PAIN DESCRIPTION - DESCRIPTORS
DESCRIPTORS: BURNING
DESCRIPTORS: BURNING;SHARP

## 2024-11-18 ASSESSMENT — PAIN DESCRIPTION - ORIENTATION
ORIENTATION: MID
ORIENTATION: MID

## 2024-11-18 NOTE — PROGRESS NOTES
Surgical report called to OZIEL Finch on 4K. Patient alert with no complaints of pain, Black cell phone with patient.

## 2024-11-18 NOTE — ANESTHESIA POSTPROCEDURE EVALUATION
Department of Anesthesiology  Postprocedure Note    Patient: Martin Morris IV  MRN: [id number]  YOB: 1955  Date of evaluation: 11/18/2024    Procedure Summary       Date: 11/18/24 [facility]    Anesthesia Start: 0738 Anesthesia Stop: 0807    Procedure: CYSTOSCOPY URETERAL STENT INSERTION (Right) Diagnosis:       Ureteral stone      (Ureteral stone [N20.1])    Surgeons: Codey Rico Jr., MD Responsible Provider: Chalo Becker DO    Anesthesia Type: MAC ASA Status: 3            Anesthesia Type: No value filed.    Tay Phase I:      Tay Phase II:      Anesthesia Post Evaluation    Patient location during evaluation: bedside  Patient participation: complete - patient participated  Level of consciousness: awake  Pain score: 1  Airway patency: patent  Nausea & Vomiting: no nausea and no vomiting  Cardiovascular status: blood pressure returned to baseline  Respiratory status: acceptable  Hydration status: stable  Pain management: satisfactory to patient        No notable events documented.

## 2024-11-18 NOTE — PROGRESS NOTES
R stent placed for stone  Robles exchanged  Continue abx  Robles for 24 hrs after afebrile.  Per Dr Rico note

## 2024-11-18 NOTE — PLAN OF CARE
Problem: Discharge Planning  Goal: Discharge to home or other facility with appropriate resources  11/18/2024 1054 by Josette Anders, RN  Outcome: Progressing  Flowsheets  Taken 11/18/2024 1054 by Josette Anders RN  Discharge to home or other facility with appropriate resources: Identify barriers to discharge with patient and caregiver  Taken 11/18/2024 0830 by Josette Anders RN  Discharge to home or other facility with appropriate resources: Identify barriers to discharge with patient and caregiver  Taken 11/18/2024 0815 by Amparo Pastrana  Discharge to home or other facility with appropriate resources: Identify barriers to discharge with patient and caregiver  11/18/2024 0102 by Teena Silverio RN  Outcome: Progressing  Flowsheets (Taken 11/17/2024 2100)  Discharge to home or other facility with appropriate resources: Identify barriers to discharge with patient and caregiver     Problem: ABCDS Injury Assessment  Goal: Absence of physical injury  11/18/2024 1054 by Josette Anders RN  Outcome: Progressing  Flowsheets (Taken 11/18/2024 1054)  Absence of Physical Injury: Implement safety measures based on patient assessment  11/18/2024 0102 by Teena Silverio RN  Outcome: Progressing     Problem: Safety - Adult  Goal: Free from fall injury  11/18/2024 1054 by Josette Anders RN  Outcome: Progressing  Flowsheets (Taken 11/18/2024 1054)  Free From Fall Injury: Instruct family/caregiver on patient safety  11/18/2024 0102 by Teena Silverio RN  Outcome: Progressing     Problem: Skin/Tissue Integrity  Goal: Absence of new skin breakdown  Description: 1.  Monitor for areas of redness and/or skin breakdown  2.  Assess vascular access sites hourly  3.  Every 4-6 hours minimum:  Change oxygen saturation probe site  4.  Every 4-6 hours:  If on nasal continuous positive airway pressure, respiratory therapy assess nares and determine need for appliance change or resting period.  11/18/2024 1054 by

## 2024-11-18 NOTE — PROGRESS NOTES
Hospitalist Progress Note  Internal Medicine Resident      Patient: Martin Morris IV 69 y.o. male      Unit/Bed: 4K-06/006-A    Admit Date: 11/17/2024      ASSESSMENT AND PLAN  Active Problems  Complicated urinary tract infection, s/p obstructive uropathy and right nephrolithiasis. CC of right-sided flank pain. CT A/P 6 mm ureterolith right ureter with associated right hydronephrosis, with some concern for cystitis.  History of nephrolithiasis earlier this year, required lithotripsy/bilateral ureteral stent placement, removed 5/2024.  ED UA large blood, large leukocytes, >100 WBC, many bacteria.   In ED no fevers, but severe chills.  S/p R ureteral stent placement 11/18.  Urine culture 11/17 with Proteus.  Urology consulted  Robles for 24 hours s/p stent placement  Resume diet  Oxycodone  Ceftriaxone, beginning 11/17, 5 day course  Urine culture sensitivity pending.  CBC  Severe CLIFFORD, postobstructive. On home ACEi.  S/p ED Toradol.  ED FeNa 1.4% suggestive of intrinsic renal damage.  Baseline 1.0 2022.  ED Cr 1.6 11/17, increased to 4.1 11/18.  Repeat UA with microscopy to evaluate for casts   cc/h   Strict IO, daily weights  Hold lisinopril, aspirin, digoxin, fenofibrate  BMP  Paroxysmal atrial fibrillation: [id number] 3.  S/p KRYSTAL clip.  On no anticoagulation.  Rhythm controlled with digoxin 0.125 mg daily.  Rate controlled with Lopressor.  Lopressor 50 mg p.o. bid  Hold Digoxin to 50 mcg p.o. qd  Telemetry  Keep K>4, Mag >2  Hypertension. Home Lisinopril and Lopressor.  Lopressor 50 mg p.o. bid  Hold lisinopril 40 p.o. qd for CLIFFORD  Hydralazine 10 mg IV q6h prn  Type 2 diabetes mellitus: Was on metformin and Jardiance in past, now only on Ozempic.  Low dose SSI with HGP, POC BG    Resolved Problems  Obstructive uropathy  Right nephrolithiasis    Chronic Conditions (reviewed and stable unless otherwise stated)  Aortic stenosis.  S/p bioprosthetic AVR  Complete heart block.  S/p dual-chamber  nerves:  grossly non-focal 2-12.     Psychiatric: Alert and oriented, normal insight and thought content.   Capillary Refill: Brisk,< 3 seconds.  Peripheral Pulses: +2 palpable, equal bilaterally.       Labs/Radiology: See chart or assessment above.     Electronically signed by Nikita Castellanos DO on 11/18/2024 at 5:20 PM  Case was discussed with Attending, Dr. Hayder Kim MD.

## 2024-11-18 NOTE — OP NOTE
FACILITY: [place], [place]   Martin Morris IV  1955  [id number]    DATE:  11/18/24  SURGEON:  Dr. Codey Rico Jr, MD , M.D.  ASSISTANT:  Dr. Codey Rico Jr, MD M.D.  PREOPERATIVE DIAGNOSIS: right Kidney stone   POSTOPERATIVE DIAGNOSIS: Same  PROCEDURES PERFORMED:  1. Cystoscopy.  2. Right sided stent placement.  DRAINS: A right sided 6x28 cm double-J ureteral stent  SPECIMEN: none  ANESTHESIA: General  ESTIMATED BLOOD LOSS:  None.   COMPLICATIONS:  None.     INDICATIONS FOR PROCEDURE:  Martin Morris IV is a 69 y.o. male presents for right septic kidney stone.  After the risks, benefits, alternatives, of the procedure were discussed with the patient, informed consent was obtained.  The patient elected to proceed.     DETAILS OF THE PROCEDURE:  The patient was brought back to the preoperative holding area to the operating suite, and was transferred to the operating table where the patient lay in supine position. General endotracheal anesthesia was induced, and patient was prepped and draped in standard surgical fashion after being placed in dorsolithotomy position. A proper timeout was performed, preoperative antibiotics were given. A rigid cystoscope with a 22 [place] sheath with 30 degree lens was inserted in the patient's urethral and into the bladder with ease. We then focused our attention on the right ureteral orifice, which we cannulated with our glidewire. Over our glidewire we placed a 6x28 cm double-J ureteral stent and we noted appropriate placement in the upper collecting system using fluoroscopy.  We then removed our wire. There was good proximal and distal curl. We did not leave the string at the end of the stent. We then drained the patient's bladder and removed the scope and the procedure was subsequently terminated.      Plan:   Transferred to floor for observation  Continue abx  Robles for 24 hrs after afebrile.    The patient will need to follow up for definitive stone

## 2024-11-18 NOTE — CARE COORDINATION
Case Management Assessment Initial Evaluation    Date/Time of Evaluation: 2024 11:35 AM  Assessment Completed by: Abeba Gill RN    If patient is discharged prior to next notation, then this note serves as note for discharge by case management.    Patient Name: Martin Morris IV                   YOB: 1955  Diagnosis: Kidney stone [N20.0]  Obstructive uropathy [N13.9]  CLIFFORD (acute kidney injury) (HCC) [N17.9]  Acute cystitis with hematuria [N30.01]  Elevated blood pressure reading with diagnosis of hypertension [I10]                   Date / Time: 2024  2:23 AM  Location: [address] [place] [address]     Patient Admission Status: Inpatient   Readmission Risk Low 0-14, Mod 15-19), High > 20: Readmission Risk Score: 12    Current PCP: Daina Clark MD  Health Care Decision Makers:   Primary Decision Maker: Tanya Morris - Spouse - [phone]    Secondary Decision Maker: Olga Lidia Morris) - Child - [phone]  ** Has HCPOA documents at homedesignating 1.) Tanya 2.) Olga Lidia.     Additional Case Management Notes: To ED w/ right flank pain radiating to his back. Robles placed at bedside yesterday. Hospitalist and Urology following. OR this AM. Started on No added Na diet. Labs in AM. Robles care.Start IVF@100. IV rocephin q24h. DM management. Roxicodone prn. Tmax 102.1 over HS. Creat 4.1 (3.5) WBC 19.1 (24)    Procedures:   : OR w/ Dr. Rico: 1. Cystoscopy. 2. Right sided stent placement.     Imagin/17 CT A/P: Right ureteral calculus. Mild right hydronephrosis. Possible cystitis. Left lower lobe pulmonary nodules. Recommend follow-up chest CT in 3-6  months per Fleischner Society guidelines.    Patient Goals/Plan/Treatment Preferences: Met w/ Rhys. Verified insurance and PCP. He is independent and drives. Has DME, listed below. Denies needs for HH, OP services or additional DME. Plans to return home w/ his wife.      [phone]   Service Assessment   Patient Orientation

## 2024-11-18 NOTE — PLAN OF CARE
Problem: Discharge Planning  Goal: Discharge to home or other facility with appropriate resources  11/18/2024 0102 by Teena Silverio RN  Outcome: Progressing  Flowsheets (Taken 11/17/2024 2100)  Discharge to home or other facility with appropriate resources: Identify barriers to discharge with patient and caregiver     Problem: ABCDS Injury Assessment  Goal: Absence of physical injury  11/18/2024 0102 by Teena Silverio RN  Outcome: Progressing     Problem: Safety - Adult  Goal: Free from fall injury  11/18/2024 0102 by Teena Silverio RN  Outcome: Progressing     Problem: Skin/Tissue Integrity  Goal: Absence of new skin breakdown  Description: 1.  Monitor for areas of redness and/or skin breakdown  2.  Assess vascular access sites hourly  3.  Every 4-6 hours minimum:  Change oxygen saturation probe site  4.  Every 4-6 hours:  If on nasal continuous positive airway pressure, respiratory therapy assess nares and determine need for appliance change or resting period.  11/18/2024 0102 by Teena Silverio RN  Outcome: Progressing     Problem: Pain  Goal: Verbalizes/displays adequate comfort level or baseline comfort level  11/18/2024 0102 by Teena Silverio RN  Outcome: Progressing   Care plan reviewed with patient.  Patient verbalize understanding of the plan of care and contribute to goal setting.

## 2024-11-18 NOTE — PROGRESS NOTES
Pharmacy Medication History Note    List of current medications patient is taking is complete.    Source of information: patient, dispense report    Changes made to medication list:  Medications removed (include reason, ex. therapy complete or physician discontinued):  Furosemide 40 mg, patient states he \"pees too much already\" and was last filled in 2022  Potassium chloride 10 mEq, patient does not use because he no longer is on furosemide  Lisinopril 20 mg, patient is taking 40 mg    Medications added/doses adjusted:  Adjusted digoxin 125 mcg to 2 tablet by mouth daily  Added lisinopril 40 mg 1 tablet by mouth daily     Other notes (ex. Recent course of antibiotics, Coumadin dosing):  Patient takes his Ozempic 2 mg on Thursdays  Denies use of other OTC or herbal medications.    Electronically signed by Waylon Stallworth on 11/18/2024 at 9:40 AM

## 2024-11-18 NOTE — ANESTHESIA PRE PROCEDURE
Department of Anesthesiology  Preprocedure Note       Name:  Martin Morris IV   Age:  69 y.o.  :  1955                                          MRN:  [id number]         Date:  2024      Surgeon: Surgeon(s):  Codey Rico Jr., MD    Procedure: Procedure(s):  CYSTOSCOPY URETERAL STENT INSERTION    Medications prior to admission:   Prior to Admission medications    Medication Sig Start Date End Date Taking? Authorizing Provider   Semaglutide (OZEMPIC, 2 MG/DOSE, SC) Inject 2 mg into the skin once a week   Yes Ivy Borrego MD   fenofibrate (TRIGLIDE) 160 MG tablet TAKE 1 TABLET BY MOUTH EVERY DAY  Patient taking differently: Take 1 tablet by mouth daily 23  Yes Tala Garg MD   metoprolol tartrate (LOPRESSOR) 50 MG tablet TAKE 1 TABLET BY MOUTH TWICE DAILY 23  Yes Tala Garg MD   rosuvastatin (CRESTOR) 20 MG tablet TAKE 1 TABLET BY MOUTH EVERY NIGHT  Patient taking differently: Take 1 tablet by mouth daily 5/3/23  Yes Tala Garg MD   DIGOXIN PO Take 0.125 mg by mouth at bedtime Every other day 22  Yes Ivy Borrego MD   esomeprazole (NEXIUM) 40 MG delayed release capsule TAKE ONE CAPSULE BY MOUTH EVERY MORNING BEFORE BREAKFAST 23  Yes Tala Garg MD   aspirin 81 MG EC tablet Take 1 tablet by mouth Take one tablet once a day   Yes Ivy Borrego MD   furosemide (LASIX) 40 MG tablet 1 tablet as needed (edema)   Yes Ivy Borrego MD   potassium chloride (KLOR-CON) 10 MEQ extended release tablet Take 1 tablet by mouth daily as needed (edema) 21  Yes Ivy Borrego MD   Multiple Vitamins-Minerals (MULTIVITAMIN ADULTS 50+ PO) Take by mouth daily   Yes Ivy Borrego MD   Cholecalciferol (VITAMIN D) 1000 UNIT CAPS Take 2 capsules by mouth daily   Yes Ivy Borrego MD   lisinopril (PRINIVIL;ZESTRIL) 20 MG tablet TAKE 1 TABLET BY MOUTH DAILY  Patient taking differently: Take 2 tablets by mouth daily 23

## 2024-11-18 NOTE — PROGRESS NOTES
Hospitalist Progress Note  Internal Medicine Resident      Patient: Martin Morris IV 69 y.o. male      Unit/Bed: -06/006-A    Admit Date: 11/17/2024      ASSESSMENT AND PLAN  Active Problems  Obstructive uropathy: No change in sxs yet.    Urology consulted.  Stent placement 11/18/24.    Tylenol PRN for pain, no NSAIDs (CLIFFORD)  UTI: Proteus  Rocephin  CLIFFORD  Likely due to obstructive uropathy, monitor for with Obstructive uropathy  Leukocytosis: UTI  Rocephin  Paroxymal atrial fibrillation  Digoxin 0.125 mg daily  Lopressor daily  Monitor telemetry  HTN: not controlled, 157/76, 140/78  Lopressor  Lisinopril held due to CLIFFORD  Monitor through CLIFFORD, restart lisinopril if CLIFFORD resolves and pressures increase, diltiazem if pressures increase without CLIFFORD resolution  Resolved Problems  N/A  Chronic Conditions (reviewed and stable unless otherwise stated)  Aortic stenosis- bioprosthetic AVR  Complete heart block- dual chamber pacemaker  HLD- continue statin   ADRI- CPAP at night  T2DM- sliding scale lantus       LDA: []CVC / []PICC / []Midline / []Robles / []Drains / []Mediport / [x]None  Antibiotics: rochephin  Steroids: n/a  Labs (still needed?): [x]Yes / []No  IVF (still needed?): [x]Yes / []No    Level of care: [x]Step Down / []Med-Surg  Bed Status: [x]Inpatient / []Observation  Telemetry: []Yes / [x]No  PT/OT: []Yes / [x]No    DVT Prophylaxis: [] Lovenox / [] Heparin / [] SCDs / [] Already on Systemic Anticoagulation / [x] None     Expected discharge date:  TBD  Disposition: TBD     Code status: Full Code     ===================================================================    Chief Complaint: Flank Pain    Subjective (past 24 hours): Pt received Rt ureteral stent 11/18/24.  Endorses oliguria and dysuria soon after procedure.  Labs 11/18/24 showed creatinine 4.1, GFR of 15.  Urine culture showed Proteus infection.      HPI / Hospital Course:  Rhys is a 69 y.o. male presenting for flank pain and brown urine.  PMH

## 2024-11-19 LAB
ANION GAP SERPL CALC-SCNC: 12 MEQ/L (ref 8–16)
BASOPHILS ABSOLUTE: 0 THOU/MM3 (ref 0–0.1)
BASOPHILS NFR BLD AUTO: 0.3 %
BUN SERPL-MCNC: 42 MG/DL (ref 7–22)
CALCIUM SERPL-MCNC: 8.3 MG/DL (ref 8.5–10.5)
CHLORIDE SERPL-SCNC: 107 MEQ/L (ref 98–111)
CO2 SERPL-SCNC: 20 MEQ/L (ref 23–33)
CREAT SERPL-MCNC: 2.4 MG/DL (ref 0.4–1.2)
DEPRECATED RDW RBC AUTO: 48.6 FL (ref 35–45)
EOSINOPHIL NFR BLD AUTO: 1.4 %
EOSINOPHILS ABSOLUTE: 0.2 THOU/MM3 (ref 0–0.4)
ERYTHROCYTE [DISTWIDTH] IN BLOOD BY AUTOMATED COUNT: 13.7 % (ref 11.5–14.5)
FIBRINOGEN PPP-MCNC: 670 MG/100ML (ref 155–475)
FSP PPP LA-ACNC: < 5 MCG/ML
GFR SERPL CREATININE-BSD FRML MDRD: 29 ML/MIN/1.73M2
GLUCOSE BLD STRIP.AUTO-MCNC: 118 MG/DL (ref 70–108)
GLUCOSE BLD STRIP.AUTO-MCNC: 120 MG/DL (ref 70–108)
GLUCOSE BLD STRIP.AUTO-MCNC: 125 MG/DL (ref 70–108)
GLUCOSE BLD STRIP.AUTO-MCNC: 128 MG/DL (ref 70–108)
GLUCOSE SERPL-MCNC: 127 MG/DL (ref 70–108)
HAPTOGLOB SERPL-MCNC: 143 MG/DL (ref 30–200)
HCT VFR BLD AUTO: 42.6 % (ref 42–52)
HGB BLD-MCNC: 14.3 GM/DL (ref 14–18)
HGB RETIC QN AUTO: 34.2 PG (ref 28.2–35.7)
IMM GRANULOCYTES # BLD AUTO: 0.08 THOU/MM3 (ref 0–0.07)
IMM GRANULOCYTES NFR BLD AUTO: 0.7 %
IMM RETICS NFR: 16.5 % (ref 2.3–13.4)
LACTATE SERPL-SCNC: 1.1 MMOL/L (ref 0.5–2)
LDH SERPL L TO P-CCNC: 233 U/L (ref 100–190)
LYMPHOCYTES ABSOLUTE: 1.6 THOU/MM3 (ref 1–4.8)
LYMPHOCYTES NFR BLD AUTO: 13.7 %
MCH RBC QN AUTO: 32.4 PG (ref 26–33)
MCHC RBC AUTO-ENTMCNC: 33.6 GM/DL (ref 32.2–35.5)
MCV RBC AUTO: 96.4 FL (ref 80–94)
MONOCYTES ABSOLUTE: 0.9 THOU/MM3 (ref 0.4–1.3)
MONOCYTES NFR BLD AUTO: 7.7 %
NEUTROPHILS ABSOLUTE: 8.8 THOU/MM3 (ref 1.8–7.7)
NEUTROPHILS NFR BLD AUTO: 76.2 %
NRBC BLD AUTO-RTO: 0 /100 WBC
PLATELET # BLD AUTO: 107 THOU/MM3 (ref 130–400)
PMV BLD AUTO: 12 FL (ref 9.4–12.4)
POTASSIUM SERPL-SCNC: 3.9 MEQ/L (ref 3.5–5.2)
RBC # BLD AUTO: 4.42 MILL/MM3 (ref 4.7–6.1)
RETICS # AUTO: 110 THOU/MM3 (ref 20–115)
RETICS/RBC NFR AUTO: 2.5 % (ref 0.5–2)
SODIUM SERPL-SCNC: 139 MEQ/L (ref 135–145)
WBC # BLD AUTO: 11.5 THOU/MM3 (ref 4.8–10.8)

## 2024-11-19 PROCEDURE — 85046 RETICYTE/HGB CONCENTRATE: CPT

## 2024-11-19 PROCEDURE — 99232 SBSQ HOSP IP/OBS MODERATE 35: CPT | Performed by: INTERNAL MEDICINE

## 2024-11-19 PROCEDURE — 85384 FIBRINOGEN ACTIVITY: CPT

## 2024-11-19 PROCEDURE — 85362 FIBRIN DEGRADATION PRODUCTS: CPT

## 2024-11-19 PROCEDURE — 6370000000 HC RX 637 (ALT 250 FOR IP)

## 2024-11-19 PROCEDURE — 83010 ASSAY OF HAPTOGLOBIN QUANT: CPT

## 2024-11-19 PROCEDURE — 85025 COMPLETE CBC W/AUTO DIFF WBC: CPT

## 2024-11-19 PROCEDURE — 83615 LACTATE (LD) (LDH) ENZYME: CPT

## 2024-11-19 PROCEDURE — 6360000002 HC RX W HCPCS

## 2024-11-19 PROCEDURE — 83605 ASSAY OF LACTIC ACID: CPT

## 2024-11-19 PROCEDURE — 2580000003 HC RX 258

## 2024-11-19 PROCEDURE — 2580000003 HC RX 258: Performed by: INTERNAL MEDICINE

## 2024-11-19 PROCEDURE — 82948 REAGENT STRIP/BLOOD GLUCOSE: CPT

## 2024-11-19 PROCEDURE — 2060000000 HC ICU INTERMEDIATE R&B

## 2024-11-19 PROCEDURE — 99231 SBSQ HOSP IP/OBS SF/LOW 25: CPT | Performed by: UROLOGY

## 2024-11-19 PROCEDURE — 80048 BASIC METABOLIC PNL TOTAL CA: CPT

## 2024-11-19 PROCEDURE — 36415 COLL VENOUS BLD VENIPUNCTURE: CPT

## 2024-11-19 RX ADMIN — OXYCODONE 5 MG: 5 TABLET ORAL at 20:25

## 2024-11-19 RX ADMIN — SODIUM CHLORIDE: 9 INJECTION, SOLUTION INTRAVENOUS at 07:23

## 2024-11-19 RX ADMIN — ROSUVASTATIN CALCIUM 20 MG: 20 TABLET, FILM COATED ORAL at 20:25

## 2024-11-19 RX ADMIN — PANTOPRAZOLE SODIUM 40 MG: 40 TABLET, DELAYED RELEASE ORAL at 08:30

## 2024-11-19 RX ADMIN — Medication 2000 UNITS: at 08:30

## 2024-11-19 RX ADMIN — METOPROLOL TARTRATE 50 MG: 50 TABLET, FILM COATED ORAL at 08:30

## 2024-11-19 RX ADMIN — METOPROLOL TARTRATE 50 MG: 50 TABLET, FILM COATED ORAL at 20:25

## 2024-11-19 RX ADMIN — HYDRALAZINE HYDROCHLORIDE 10 MG: 20 INJECTION INTRAMUSCULAR; INTRAVENOUS at 15:18

## 2024-11-19 RX ADMIN — WATER 1000 MG: 1 INJECTION INTRAMUSCULAR; INTRAVENOUS; SUBCUTANEOUS at 05:27

## 2024-11-19 ASSESSMENT — PAIN SCALES - GENERAL: PAINLEVEL_OUTOF10: 0

## 2024-11-19 NOTE — PLAN OF CARE
Problem: Discharge Planning  Goal: Discharge to home or other facility with appropriate resources  Outcome: Progressing  Flowsheets (Taken 11/19/2024 0430)  Discharge to home or other facility with appropriate resources: Identify barriers to discharge with patient and caregiver     Problem: ABCDS Injury Assessment  Goal: Absence of physical injury  Outcome: Progressing  Flowsheets (Taken 11/19/2024 0430)  Absence of Physical Injury: Implement safety measures based on patient assessment     Problem: Safety - Adult  Goal: Free from fall injury  Outcome: Progressing  Flowsheets (Taken 11/19/2024 0430)  Free From Fall Injury: Instruct family/caregiver on patient safety     Problem: Skin/Tissue Integrity  Goal: Absence of new skin breakdown  Description: 1.  Monitor for areas of redness and/or skin breakdown  2.  Assess vascular access sites hourly  3.  Every 4-6 hours minimum:  Change oxygen saturation probe site  4.  Every 4-6 hours:  If on nasal continuous positive airway pressure, respiratory therapy assess nares and determine need for appliance change or resting period.  Outcome: Progressing     Problem: Pain  Goal: Verbalizes/displays adequate comfort level or baseline comfort level  Outcome: Progressing  Flowsheets  Taken 11/19/2024 0430 by Brenda Crockett, RN  Verbalizes/displays adequate comfort level or baseline comfort level:   Encourage patient to monitor pain and request assistance   Assess pain using appropriate pain scale  Taken 11/18/2024 1940 by Josette Anders RN  Verbalizes/displays adequate comfort level or baseline comfort level: Encourage patient to monitor pain and request assistance     Problem: Neurosensory - Adult  Goal: Achieves stable or improved neurological status  Outcome: Progressing  Flowsheets (Taken 11/19/2024 0430)  Achieves stable or improved neurological status: Assess for and report changes in neurological status     Problem: Cardiovascular - Adult  Goal: Maintains optimal  cardiac output and hemodynamic stability  Outcome: Progressing  Flowsheets (Taken 11/19/2024 0430)  Maintains optimal cardiac output and hemodynamic stability: Monitor blood pressure and heart rate     Problem: Cardiovascular - Adult  Goal: Absence of cardiac dysrhythmias or at baseline  Outcome: Progressing  Flowsheets (Taken 11/19/2024 0430)  Absence of cardiac dysrhythmias or at baseline: Monitor cardiac rate and rhythm     Problem: Musculoskeletal - Adult  Goal: Return mobility to safest level of function  Outcome: Progressing  Flowsheets (Taken 11/19/2024 0430)  Return Mobility to Safest Level of Function: Assess patient stability and activity tolerance for standing, transferring and ambulating with or without assistive devices     Problem: Genitourinary - Adult  Goal: Absence of urinary retention  Outcome: Progressing  Flowsheets (Taken 11/19/2024 0430)  Absence of urinary retention: Assess patient’s ability to void and empty bladder     Problem: Genitourinary - Adult  Goal: Urinary catheter remains patent  Outcome: Progressing  Flowsheets (Taken 11/19/2024 0430)  Urinary catheter remains patent: Assess patency of urinary catheter     Problem: Infection - Adult  Goal: Absence of infection at discharge  Outcome: Progressing  Flowsheets (Taken 11/19/2024 0430)  Absence of infection at discharge:   Assess and monitor for signs and symptoms of infection   Monitor lab/diagnostic results   Administer medications as ordered     Problem: Infection - Adult  Goal: Absence of infection during hospitalization  Outcome: Progressing  Flowsheets (Taken 11/19/2024 0430)  Absence of infection during hospitalization:   Monitor lab/diagnostic results   Assess and monitor for signs and symptoms of infection   Administer medications as ordered     Problem: Metabolic/Fluid and Electrolytes - Adult  Goal: Electrolytes maintained within normal limits  Outcome: Progressing  Flowsheets (Taken 11/19/2024 0430)  Electrolytes maintained

## 2024-11-19 NOTE — PROGRESS NOTES
Hospitalist Progress Note  Internal Medicine Resident      Patient: Martin Morris IV 69 y.o. male      Unit/Bed: 4K-06/006-A    Admit Date: 11/17/2024      ASSESSMENT AND PLAN  Active Problems  Complicated urinary tract infection, s/p obstructive uropathy and right nephrolithiasis. CC of right-sided flank pain. CT A/P 6 mm ureterolith right ureter with associated right hydronephrosis, with some concern for cystitis.  History of nephrolithiasis earlier this year, required lithotripsy/bilateral ureteral stent placement, removed 5/2024.  ED UA large blood, large leukocytes, >100 WBC, many bacteria.   In ED no fevers, but severe chills.  S/p R ureteral stent placement 11/18.  Urine culture 11/17 with Proteus.  Robles removed  Oxycodone  Ceftriaxone x 5 days 11/17 - 11/21  Outpatient urology follow-up  Severe CLIFFORD, postobstructive. On home ACEi.  S/p ED Toradol.  ED FeNa 1.4% suggestive of intrinsic renal damage.  Baseline 1.0 2022.  ED Cr 1.6 11/17, increased to 4.1 11/18, improving to 2.411/19  Pending repeat UA with microscopy to evaluate for casts   cc/h   Strict IO, daily weights  Hold lisinopril, aspirin, digoxin, fenofibrate  BMP  Paroxysmal atrial fibrillation: [id number] 3.  S/p KRYSTAL clip.  On no anticoagulation.  Rhythm controlled with digoxin 0.125 mg daily.  Rate controlled with Lopressor.  Lopressor 50 mg p.o. bid  Hold Digoxin to 50 mcg p.o. qd  Telemetry  Keep K>4, Mag >2  Hypertension. Home Lisinopril and Lopressor.  Lopressor 50 mg p.o. bid  Hold lisinopril 40 p.o. qd for CLIFFORD  Hydralazine 10 mg IV q6h prn  Type 2 diabetes mellitus: Was on metformin and Jardiance in past, now only on Ozempic.  Low dose SSI with HGP, POC BG    Resolved Problems  Obstructive uropathy  Right nephrolithiasis    Chronic Conditions (reviewed and stable unless otherwise stated)  Aortic stenosis.  S/p bioprosthetic AVR  Complete heart block.  S/p dual-chamber pacemaker  Hyperlipidemia. Continue home statin plus  Medications    sodium chloride 100 mL/hr at 11/19/24 0723    dextrose      Scheduled Medications    [Held by provider] digoxin  250 mcg Oral Daily    [Held by provider] lisinopril  40 mg Oral Daily    [Held by provider] aspirin  81 mg Oral Once    Vitamin D  2,000 Units Oral Daily    pantoprazole  40 mg Oral QAM AC    [Held by provider] fenofibrate  160 mg Oral Daily    metoprolol tartrate  50 mg Oral BID    rosuvastatin  20 mg Oral Nightly    sodium chloride flush  5-40 mL IntraVENous 2 times per day    insulin lispro  0-4 Units SubCUTAneous 4x Daily AC & HS    cefTRIAXone (ROCEPHIN) IV  1,000 mg IntraVENous Q24H    PRN Meds: oxyBUTYnin, hydrALAZINE, sodium chloride flush, potassium chloride **OR** potassium alternative oral replacement **OR** potassium chloride, magnesium sulfate, ondansetron **OR** ondansetron, polyethylene glycol, oxyCODONE, [Held by provider] morphine, glucose, dextrose bolus **OR** dextrose bolus, glucagon (rDNA), dextrose, acetaminophen **OR** acetaminophen    Exam:  BP (!) 160/103   Pulse 65   Temp 98.2 °F (36.8 °C) (Oral)   Resp 18   Ht 1.854 m (6' 1\")   Wt (!) 162.3 kg (357 lb 12.9 oz)   SpO2 95%   BMI 47.21 kg/m²   General: No distress, appears stated age.  Obese  Eyes:  PERRL. Conjunctivae/corneas clear.  HENT: Head normal appearing. Nares normal. Oral mucosa moist.  Hearing intact.   Neck: Supple, with full range of motion. Trachea midline.  No gross JVD appreciated.  Respiratory:  Normal effort. Clear to auscultation, without rales or wheezes or rhonchi.  Cardiovascular: Normal rate, regular rhythm with normal S1/S2 without murmurs.    No lower extremity edema.   Abdomen: Rotund, soft, non-tender, non-distended with normal bowel sounds.  Genitourinary: Robles in place.  Musculoskeletal: No joint swelling or tenderness. Normal tone. No abnormal movements.   Skin: Warm and dry. No rashes or lesions.  Neurologic:  No focal sensory/motor deficits in the upper or lower extremities.

## 2024-11-19 NOTE — PROGRESS NOTES
Nitin Camp, BRET - CNP  Urology Progress Note    Subjective: Martin Morris IV is a 69 y.o. male.    s/p CYSTOSCOPY URETERAL STENT INSERTION on 11/17/2024 - 11/18/2024    His/Her current Diet is: ADULT DIET; Regular; No Added Salt (3-4 gm).    Since the previous note, the patient reports the following:  No acute issues overnight.  No fevers or chills.  No nausea or vomiting.  No chest pain or shortness of breath.  No calf pain.  Pain Controlled.  Ambulating.  Tolerating PO Diet.    Robles out, voiding well  He prefers to follow up with established uro in [facility] for d/c from uro standpoint  Cont abx 10-14 DOT    Vitals and Labs:  Patient Vitals for the past 24 hrs:   BP Temp Temp src Pulse Resp SpO2   11/19/24 1130 (!) 159/98 98.2 °F (36.8 °C) Oral 70 18 94 %   11/19/24 0819 (!) 155/102 98.1 °F (36.7 °C) Oral 65 20 96 %   11/19/24 0355 (!) 142/74 98.3 °F (36.8 °C) Oral 75 20 95 %   11/18/24 2351 (!) 146/83 98 °F (36.7 °C) Oral 69 20 96 %   11/18/24 2204 -- 100 °F (37.8 °C) Rectal -- -- --   11/18/24 1940 (!) 142/84 98.6 °F (37 °C) Oral 72 20 94 %   11/18/24 1827 (!) 173/70 -- -- -- -- --   11/18/24 1514 (!) 181/101 98.2 °F (36.8 °C) Oral 71 20 95 %     I/O last 3 completed shifts:  In: 5594 [P.O.:2220; I.V.:3354; Other:20]  Out: 2717 [Urine:2717]    Recent Labs     11/17/24  1747 11/18/24  0343 11/19/24  0446   WBC 24.0* 19.1* 11.5*   HGB 16.7 13.9* 14.3   HCT 48.7 42.3 42.6   MCV 95.9* 97.9* 96.4*    110* 107*     Recent Labs     11/17/24  1914 11/18/24  0343 11/19/24  0446    137 139   K 4.4 4.2 3.9    105 107   CO2 19* 19* 20*   BUN 35* 42* 42*   CREATININE 3.5* 4.1* 2.4*       Recent Labs     11/17/24  0240   COLORU YELLOW   PHUR 7.0   WBCUA > 100   RBCUA 25-50   YEAST NONE SEEN   BACTERIA MANY   LEUKOCYTESUR LARGE*   UROBILINOGEN 0.2   BILIRUBINUR NEGATIVE   BLOODU LARGE*       Physical Exam:  No acute distress. Awake, alert and oriented.  Neck is supple  Regular rate and  appropriate to reduce radiation to a low as reasonably achievable.      Impression:    Patient Active Problem List   Diagnosis    Knee pain, left    Benign neoplasm of skin    Other seborrheic keratosis    Essential hypertension    Sleep apnea    Palpitations    Reflux esophagitis    Mixed hyperlipidemia    BPH (benign prostatic hyperplasia)    Morbid obesity with BMI of 50.0-59.9, adult    Arthritis of right knee    Acute cystitis with hematuria    Nonrheumatic aortic valve stenosis    Nonrheumatic aortic valve insufficiency    Hyperglycemia    Ascending aortic aneurysm (HCC)    COVID-19    Wrist pain, acute, left    Obstructive uropathy       s/p CYSTOSCOPY URETERAL STENT INSERTION on 11/17/2024 - 11/18/2024    BRET Martinez - CNP  12:37 PM 11/19/2024

## 2024-11-19 NOTE — CARE COORDINATION
11/19/24, 12:56 PM EST    DISCHARGE ON GOING EVALUATION    [facility]       H[facility]: [facility]  Location: -06/006-A Reason for admit: Kidney stone [N20.0]  Obstructive uropathy [N13.9]  CLIFFORD (acute kidney injury) (HCC) [N17.9]  Acute cystitis with hematuria [N30.01]  Elevated blood pressure reading with diagnosis of hypertension [I10]     Procedures:   11/18: OR w/ Dr. Rico: 1. Cystoscopy. 2. Right sided stent placement.     Imaging since last note: none    Barriers to Discharge: Hospitalist and Urology following. POD 1. Robles out, voiding well. IVF@100. IV rocephin q24h. Creat 2.4 (4.1)     PCP: Daina Clark MD  Readmission Risk Score: 11.2    Patient Goals/Plan/Treatment Preferences: Return home w/ wife. Denies needs.

## 2024-11-20 VITALS
HEIGHT: 73 IN | DIASTOLIC BLOOD PRESSURE: 108 MMHG | OXYGEN SATURATION: 97 % | HEART RATE: 70 BPM | TEMPERATURE: 98 F | BODY MASS INDEX: 41.75 KG/M2 | RESPIRATION RATE: 18 BRPM | SYSTOLIC BLOOD PRESSURE: 196 MMHG | WEIGHT: 315 LBS

## 2024-11-20 PROBLEM — N17.9 AKI (ACUTE KIDNEY INJURY) (HCC): Status: ACTIVE | Noted: 2024-11-20

## 2024-11-20 PROBLEM — N20.0 KIDNEY STONE: Status: ACTIVE | Noted: 2024-11-20

## 2024-11-20 LAB
ANION GAP SERPL CALC-SCNC: 9 MEQ/L (ref 8–16)
BACTERIA UR CULT: ABNORMAL
BASOPHILS ABSOLUTE: 0 THOU/MM3 (ref 0–0.1)
BASOPHILS NFR BLD AUTO: 0.5 %
BUN SERPL-MCNC: 29 MG/DL (ref 7–22)
CALCIUM SERPL-MCNC: 8.8 MG/DL (ref 8.5–10.5)
CHLORIDE SERPL-SCNC: 107 MEQ/L (ref 98–111)
CO2 SERPL-SCNC: 23 MEQ/L (ref 23–33)
CREAT SERPL-MCNC: 1.4 MG/DL (ref 0.4–1.2)
DEPRECATED RDW RBC AUTO: 47.2 FL (ref 35–45)
EOSINOPHIL NFR BLD AUTO: 1.7 %
EOSINOPHILS ABSOLUTE: 0.2 THOU/MM3 (ref 0–0.4)
ERYTHROCYTE [DISTWIDTH] IN BLOOD BY AUTOMATED COUNT: 13.2 % (ref 11.5–14.5)
GFR SERPL CREATININE-BSD FRML MDRD: 55 ML/MIN/1.73M2
GLUCOSE BLD STRIP.AUTO-MCNC: 107 MG/DL (ref 70–108)
GLUCOSE SERPL-MCNC: 106 MG/DL (ref 70–108)
HCT VFR BLD AUTO: 43.1 % (ref 42–52)
HGB BLD-MCNC: 14.5 GM/DL (ref 14–18)
IMM GRANULOCYTES # BLD AUTO: 0.09 THOU/MM3 (ref 0–0.07)
IMM GRANULOCYTES NFR BLD AUTO: 1 %
LYMPHOCYTES ABSOLUTE: 1.9 THOU/MM3 (ref 1–4.8)
LYMPHOCYTES NFR BLD AUTO: 21.8 %
MCH RBC QN AUTO: 32.3 PG (ref 26–33)
MCHC RBC AUTO-ENTMCNC: 33.6 GM/DL (ref 32.2–35.5)
MCV RBC AUTO: 96 FL (ref 80–94)
MONOCYTES ABSOLUTE: 0.6 THOU/MM3 (ref 0.4–1.3)
MONOCYTES NFR BLD AUTO: 6.9 %
NEUTROPHILS ABSOLUTE: 6.1 THOU/MM3 (ref 1.8–7.7)
NEUTROPHILS NFR BLD AUTO: 68.1 %
NRBC BLD AUTO-RTO: 0 /100 WBC
ORGANISM: ABNORMAL
PLATELET # BLD AUTO: 129 THOU/MM3 (ref 130–400)
PMV BLD AUTO: 11.5 FL (ref 9.4–12.4)
POTASSIUM SERPL-SCNC: 4.3 MEQ/L (ref 3.5–5.2)
RBC # BLD AUTO: 4.49 MILL/MM3 (ref 4.7–6.1)
SODIUM SERPL-SCNC: 139 MEQ/L (ref 135–145)
WBC # BLD AUTO: 8.9 THOU/MM3 (ref 4.8–10.8)

## 2024-11-20 PROCEDURE — 99238 HOSP IP/OBS DSCHRG MGMT 30/<: CPT | Performed by: INTERNAL MEDICINE

## 2024-11-20 PROCEDURE — 85025 COMPLETE CBC W/AUTO DIFF WBC: CPT

## 2024-11-20 PROCEDURE — 80048 BASIC METABOLIC PNL TOTAL CA: CPT

## 2024-11-20 PROCEDURE — 36415 COLL VENOUS BLD VENIPUNCTURE: CPT

## 2024-11-20 PROCEDURE — 82948 REAGENT STRIP/BLOOD GLUCOSE: CPT

## 2024-11-20 PROCEDURE — 6370000000 HC RX 637 (ALT 250 FOR IP)

## 2024-11-20 PROCEDURE — 6360000002 HC RX W HCPCS

## 2024-11-20 PROCEDURE — 2580000003 HC RX 258: Performed by: INTERNAL MEDICINE

## 2024-11-20 PROCEDURE — 2580000003 HC RX 258

## 2024-11-20 RX ORDER — METOPROLOL TARTRATE 50 MG
50 TABLET ORAL ONCE
Status: DISCONTINUED | OUTPATIENT
Start: 2024-11-20 | End: 2024-11-20 | Stop reason: HOSPADM

## 2024-11-20 RX ORDER — METOPROLOL TARTRATE 50 MG
100 TABLET ORAL 2 TIMES DAILY
Qty: 180 TABLET | Refills: 1 | Status: SHIPPED | OUTPATIENT
Start: 2024-11-20

## 2024-11-20 RX ORDER — LISINOPRIL 40 MG/1
40 TABLET ORAL DAILY
Qty: 30 TABLET | Refills: 3 | Status: SHIPPED | OUTPATIENT
Start: 2024-11-20

## 2024-11-20 RX ORDER — AMLODIPINE BESYLATE 5 MG/1
5 TABLET ORAL DAILY
Status: DISCONTINUED | OUTPATIENT
Start: 2024-11-20 | End: 2024-11-20

## 2024-11-20 RX ORDER — AMLODIPINE BESYLATE 10 MG/1
10 TABLET ORAL DAILY
Status: DISCONTINUED | OUTPATIENT
Start: 2024-11-20 | End: 2024-11-20

## 2024-11-20 RX ADMIN — METOPROLOL TARTRATE 50 MG: 50 TABLET, FILM COATED ORAL at 09:20

## 2024-11-20 RX ADMIN — SODIUM CHLORIDE: 9 INJECTION, SOLUTION INTRAVENOUS at 04:26

## 2024-11-20 RX ADMIN — SODIUM CHLORIDE, PRESERVATIVE FREE 10 ML: 5 INJECTION INTRAVENOUS at 09:20

## 2024-11-20 RX ADMIN — Medication 2000 UNITS: at 09:20

## 2024-11-20 RX ADMIN — WATER 1000 MG: 1 INJECTION INTRAMUSCULAR; INTRAVENOUS; SUBCUTANEOUS at 04:25

## 2024-11-20 RX ADMIN — PANTOPRAZOLE SODIUM 40 MG: 40 TABLET, DELAYED RELEASE ORAL at 09:19

## 2024-11-20 NOTE — PLAN OF CARE
Problem: Discharge Planning  Goal: Discharge to home or other facility with appropriate resources  Outcome: Progressing  Flowsheets (Taken 11/20/2024 0200)  Discharge to home or other facility with appropriate resources: Identify barriers to discharge with patient and caregiver     Problem: ABCDS Injury Assessment  Goal: Absence of physical injury  Outcome: Progressing  Flowsheets (Taken 11/20/2024 0200)  Absence of Physical Injury: Implement safety measures based on patient assessment     Problem: Safety - Adult  Goal: Free from fall injury  Outcome: Progressing     Problem: Skin/Tissue Integrity  Goal: Absence of new skin breakdown  Description: 1.  Monitor for areas of redness and/or skin breakdown  2.  Assess vascular access sites hourly  3.  Every 4-6 hours minimum:  Change oxygen saturation probe site  4.  Every 4-6 hours:  If on nasal continuous positive airway pressure, respiratory therapy assess nares and determine need for appliance change or resting period.  Outcome: Progressing     Problem: Pain  Goal: Verbalizes/displays adequate comfort level or baseline comfort level  Outcome: Progressing  Flowsheets (Taken 11/20/2024 0200)  Verbalizes/displays adequate comfort level or baseline comfort level: Encourage patient to monitor pain and request assistance     Problem: Neurosensory - Adult  Goal: Achieves stable or improved neurological status  Outcome: Progressing  Flowsheets (Taken 11/20/2024 0200)  Achieves stable or improved neurological status: Assess for and report changes in neurological status     Problem: Cardiovascular - Adult  Goal: Maintains optimal cardiac output and hemodynamic stability  Outcome: Progressing  Flowsheets (Taken 11/20/2024 0200)  Maintains optimal cardiac output and hemodynamic stability: Monitor blood pressure and heart rate     Problem: Cardiovascular - Adult  Goal: Absence of cardiac dysrhythmias or at baseline  Outcome: Progressing  Flowsheets (Taken 11/20/2024

## 2024-11-20 NOTE — DISCHARGE INSTRUCTIONS
Follow up with primary care doctor, urologist in [place] within the next month    Stop taking lisinopril until 11/23/24, then start retaking the medicine on 11/23/24.    Increase metoprolol tartrate from 50 bid to 100 bid.      Get lab work drawn within 2 to 3 days.    Return to the emergency department if you have worsening fever, chills, nausea, vomiting, abdominal pain, burning with urination, difficulty urinating.

## 2024-11-20 NOTE — PROGRESS NOTES
CLINICAL PHARMACY: DISCHARGE MED RECONCILIATION/REVIEW    [facility] Patient?: No  Total # of Interventions Recommended: 0   -   Total # Interventions Accepted: 0  Intervention Severity:   - Level 1 Intervention Present?: No   - Level 2 #: 0   - Level 3 #: 0   Time Spent (min): 30    Debra Wray, PharmD 11/20/2024 10:47 AM

## 2024-11-20 NOTE — PROGRESS NOTES
Patient discharged to home, patient to drive self home via private vehicle. Transport takes patient down to discharge lobby via wheelchair.   Discharge packet went over with patient including discharge instructions, including to not start Lisinopril until 11/23/2024, increasing Metoprolol to 100 mg, and appointment information. Patient left with belongings and discharge packet and order requisite for BMP in 2-3 days.   Patient's questions and concerns were addressed, patient leaves stable and ambulatory.     Patient's 's - declining other medications to bring it down at this time because he does not wish to start new medications. Metoprolol increased to 100, patient made aware and states he will take another 50 mg at home. Does not wish to take one prior to driving (2 hour drive) due to risk of hypotension. MD made aware.     Transport to take patient by [facility] Pharmacy to  medications which are ready.   Urology office to call patient to schedule stent removal.   Patient aware and agrees to both of these things.

## 2024-11-20 NOTE — DISCHARGE SUMMARY
hyperlipidemia, hypertension, paroxysmal atrial fibrillation, ADRI on CPAP, T2DM, nephrolithiasis admitted to [facility] on 11/17/2024 for sepsis secondary to complicated UTI from right nephrolithiasis, source Proteus and severe CLIFFORD.  Initially presented right flank pain with radiation to the back has history of nephrolithiasis, required cystoscopy with bilateral ureteral stent placement.  Removed 5/24 at OSH.  Developed worsening fever, chills, hypoxia, tachycardia, elevated lactate.  Robles placed due to oliguria secondary to obstructive uropathy.  Treated with IVF, ceftriaxone x 4 days, cystoscopy and right stent placement 11/18.  Robles removed, patient urine output improved, severe CLIFFORD also improved.  Patient is to get repeat BMP 2 to 3 days after discharge, follow-up with PCP for management of CLIFFORD and urologist for management of ureteral stent.    The patient was seen and examined on day of discharge and this discharge summary is in conjunction with any daily progress note from day of discharge.    The patient is discharged in stable condition.       Exam:   Vitals:  Vitals:    11/19/24 2055 11/19/24 2347 11/20/24 0355 11/20/24 0900   BP:  (!) 151/76 (!) 143/84 (!) 179/91   Pulse:  69 69 70   Resp: 18 18 18 18   Temp:  97.6 °F (36.4 °C) 97.9 °F (36.6 °C) 98 °F (36.7 °C)   TempSrc:  Oral Oral Oral   SpO2:  97% 95% 97%   Weight:       Height:         Weight: Weight - Scale: (!) 162.3 kg (357 lb 12.9 oz)     General appearance: No apparent distress, well developed, appears stated age.  Eyes:  Pupils equal, round, and reactive to light. Conjunctivae/corneas clear.  HENT: Head normal in appearance. External nares normal.  Oral mucosa moist without lesions.  Hearing grossly intact.   Neck: Supple, with full range of motion. Trachea midline.  No gross JVD appreciated.  Respiratory:  Normal respiratory effort. Clear to auscultation, bilaterally without rales or wheezes or rhonchi.  Cardiovascular: Normal  [phone] - F [phone]  [address], [place] [id number]      Phone: [phone]   lisinopril 40 MG tablet            Time Spent on discharge is 30 minutes in the examination, evaluation, counseling and review of medications and discharge plan.    Thank you Daina Clark MD for the opportunity to be involved in this patient's care.      Signed:    Electronically signed by Oswald GONZALEZ DO on 11/20/24 at 10:15 AM EST     Case was discussed with Attending, Dr. Kim

## 2024-11-20 NOTE — CARE COORDINATION
11/20/24, 11:43 AM EST    Patient goals/plan/ treatment preferences discussed by  and .  Patient goals/plan/ treatment preferences reviewed with patient/ family.  Patient/ family verbalize understanding of discharge plan and are in agreement with goal/plan/treatment preferences.  Understanding was demonstrated using the teach back method.  AVS provided by RN at time of discharge, which includes all necessary medical information pertaining to the patients current course of illness, treatment, post-discharge goals of care, and treatment preferences.     Services At/After Discharge: None    Met w/ Rhys this AM. He is agreeable to discharge home. Plans to return home w/ his wife. Has DME. Denies needs.

## 2024-11-22 LAB
BACTERIA BLD AEROBE CULT: NORMAL
BACTERIA BLD AEROBE CULT: NORMAL

## 2024-11-26 ENCOUNTER — TELEPHONE (OUTPATIENT)
Dept: UROLOGY | Age: 69
End: 2024-11-26

## (undated) DEVICE — SOLUTION IRRIG 3000ML 0.9% SOD CHL USP UROMATIC PLAS CONT

## (undated) DEVICE — CATHETER,FOLEY,SILI-ELAST,LTX,16FR,10ML: Brand: MEDLINE

## (undated) DEVICE — DRAINBAG,ANTI-REFLUX TOWER,L/F,2000ML,LL: Brand: MEDLINE

## (undated) DEVICE — CYSTO: Brand: MEDLINE INDUSTRIES, INC.

## (undated) DEVICE — TRAP POLYP ETRAP

## (undated) DEVICE — MASK O2 ADULT POM PROCEDURAL OXYGEN MASK 7FT O2 TUBING 10FT

## (undated) DEVICE — SOLUTION IRRIG 1000ML STRL H2O USP PLAS POUR BTL

## (undated) DEVICE — SNARE COLD DIAMOND 10MM THIN

## (undated) DEVICE — GUIDEWIRE URO L150CM DIA .035IN STIFF NIT HYDRPHL STR TIP

## (undated) DEVICE — FORCEPS BX L240CM JAW DIA2.4MM ORNG L CAP W/ NDL DISP RAD